# Patient Record
Sex: FEMALE | Race: ASIAN | Employment: PART TIME | URBAN - METROPOLITAN AREA
[De-identification: names, ages, dates, MRNs, and addresses within clinical notes are randomized per-mention and may not be internally consistent; named-entity substitution may affect disease eponyms.]

---

## 2020-02-26 ENCOUNTER — OFFICE VISIT (OUTPATIENT)
Dept: FAMILY MEDICINE | Facility: CLINIC | Age: 30
End: 2020-02-26
Payer: COMMERCIAL

## 2020-02-26 VITALS
TEMPERATURE: 98.6 F | HEART RATE: 83 BPM | WEIGHT: 132.3 LBS | DIASTOLIC BLOOD PRESSURE: 66 MMHG | BODY MASS INDEX: 25.97 KG/M2 | RESPIRATION RATE: 16 BRPM | SYSTOLIC BLOOD PRESSURE: 105 MMHG | HEIGHT: 60 IN | OXYGEN SATURATION: 100 %

## 2020-02-26 DIAGNOSIS — N91.2 AMENORRHEA: ICD-10-CM

## 2020-02-26 DIAGNOSIS — Z00.00 ROUTINE GENERAL MEDICAL EXAMINATION AT A HEALTH CARE FACILITY: Primary | ICD-10-CM

## 2020-02-26 LAB — HCG UR QL: POSITIVE

## 2020-02-26 PROCEDURE — 99213 OFFICE O/P EST LOW 20 MIN: CPT | Mod: 25 | Performed by: FAMILY MEDICINE

## 2020-02-26 PROCEDURE — 99385 PREV VISIT NEW AGE 18-39: CPT | Performed by: FAMILY MEDICINE

## 2020-02-26 PROCEDURE — 81025 URINE PREGNANCY TEST: CPT | Performed by: FAMILY MEDICINE

## 2020-02-26 SDOH — HEALTH STABILITY: MENTAL HEALTH: HOW OFTEN DO YOU HAVE A DRINK CONTAINING ALCOHOL?: NEVER

## 2020-02-26 ASSESSMENT — MIFFLIN-ST. JEOR: SCORE: 1246.61

## 2020-02-26 NOTE — PROGRESS NOTES
SUBJECTIVE:   CC: Timothy Crowell is an 29 year old woman who presents for preventive health visit.     Healthy Habits:     Getting at least 3 servings of Calcium per day:  Yes    Bi-annual eye exam:  NO    Dental care twice a year:  NO    Sleep apnea or symptoms of sleep apnea:  None    Diet:  Regular (no restrictions)    Frequency of exercise:  4-5 days/week    Duration of exercise:  45-60 minutes    Taking medications regularly:  Yes    Medication side effects:  Not applicable    PHQ-2 Total Score: 0    Additional concerns today:  No          1) amenorrhea  = LMP was Jan 9th, 2020 and she has had normal periods q month , so missed her period in February , she did a home HCG which was positive and here to confirm, she does feel un usually tired lately, needs a nap q day  No nausea no vomiting   According to the LMP provided this gives her SHELDON of October 16th, 2020 .     Today's PHQ-2 Score:   PHQ-2 ( 1999 [employer]) 2/26/2020   Q1: Little interest or pleasure in doing things 0   Q2: Feeling down, depressed or hopeless 0   PHQ-2 Score 0   Q1: Little interest or pleasure in doing things Not at all   Q2: Feeling down, depressed or hopeless Not at all   PHQ-2 Score 0       Abuse: Current or Past(Physical, Sexual or Emotional)- No  Do you feel safe in your environment? Yes        Social History     Tobacco Use     Smoking status: Never Smoker     Smokeless tobacco: Never Used   Substance Use Topics     Alcohol use: Never     Frequency: Never     If you drink alcohol do you typically have >3 drinks per day or >7 drinks per week? No    Alcohol Use 2/26/2020   Prescreen: >3 drinks/day or >7 drinks/week? Not Applicable   Prescreen: >3 drinks/day or >7 drinks/week? -   No flowsheet data found.    Reviewed orders with patient.  Reviewed health maintenance and updated orders accordingly - Yes  Lab work is in process  Labs reviewed in EPIC  BP Readings from Last 3 Encounters:   02/26/20 105/66    Wt Readings from Last 3  Encounters:   02/26/20 60 kg (132 lb 4.8 oz)                  There is no problem list on file for this patient.    History reviewed. No pertinent surgical history.    Social History     Tobacco Use     Smoking status: Never Smoker     Smokeless tobacco: Never Used   Substance Use Topics     Alcohol use: Never     Frequency: Never     History reviewed. No pertinent family history.      Current Outpatient Medications   Medication Sig Dispense Refill     Prenatal Vit-Fe Fumarate-FA (PRENATAL VITAMINS PO)        No Known Allergies  No lab results found.     Mammogram not appropriate for this patient based on age.    Pertinent mammograms are reviewed under the imaging tab.  History of abnormal Pap smear: NO - age 21-29 PAP every 3 years recommended     Reviewed and updated as needed this visit by clinical staff  Tobacco  Allergies  Meds  Problems  Med Hx  Surg Hx  Fam Hx  Soc Hx          Reviewed and updated as needed this visit by Provider        History reviewed. No pertinent past medical history.   History reviewed. No pertinent surgical history.    Review of Systems  CONSTITUTIONAL: NEGATIVE for fever, chills, change in weight  INTEGUMENTARU/SKIN: NEGATIVE for worrisome rashes, moles or lesions  EYES: NEGATIVE for vision changes or irritation  ENT: NEGATIVE for ear, mouth and throat problems  RESP: NEGATIVE for significant cough or SOB  BREAST: NEGATIVE for masses, tenderness or discharge  CV: NEGATIVE for chest pain, palpitations or peripheral edema  GI: NEGATIVE for nausea, abdominal pain, heartburn, or change in bowel habits  : NEGATIVE for unusual urinary or vaginal symptoms. Periods are regular.  MUSCULOSKELETAL: NEGATIVE for significant arthralgias or myalgia  NEURO: NEGATIVE for weakness, dizziness or paresthesias  PSYCHIATRIC: NEGATIVE for changes in mood or affect     OBJECTIVE:   /66   Pulse 83   Temp 98.6  F (37  C) (Oral)   Resp 16   Ht 1.524 m (5')   Wt 60 kg (132 lb 4.8 oz)   LMP  01/09/2020 (Approximate)   SpO2 100%   Breastfeeding No   BMI 25.84 kg/m    Physical Exam  GENERAL: healthy, alert and no distress  EYES: Eyes grossly normal to inspection, PERRL and conjunctivae and sclerae normal  HENT: ear canals and TM's normal, nose and mouth without ulcers or lesions  NECK: no adenopathy, no asymmetry, masses, or scars and thyroid normal to palpation  RESP: lungs clear to auscultation - no rales, rhonchi or wheezes  CV: regular rate and rhythm, normal S1 S2, no S3 or S4, no murmur, click or rub, no peripheral edema and peripheral pulses strong  ABDOMEN: soft, nontender, no hepatosplenomegaly, no masses and bowel sounds normal  MS: no gross musculoskeletal defects noted, no edema  SKIN: no suspicious lesions or rashes  NEURO: Normal strength and tone, mentation intact and speech normal  PSYCH: mentation appears normal, affect normal/bright    Diagnostic Test Results:  Labs reviewed in Epic  none     ASSESSMENT/PLAN:   1. Routine general medical examination at a health care facility  Discussed diet,calcium,exercise.Went over self breast exam.Thin prep was NOT  Done= pt will do when she does her first OB visit , see below .Eyes and teeth UTD.No immunizations needed today.See orders below for tests ordered and screening needed.      2. Amenorrhea  Congratulated , discussed diet ,exerfcise , PNV , prenatal care and timelines etc   - HCG Qual, Urine ([id number])  SHELDON is October 16th, 2020 . Based on LMP of Jan 9th, 2020 .  COUNSELING:  Reviewed preventive health counseling, as reflected in patient instructions       Regular exercise       Healthy diet/nutrition       Vision screening       Folic Acid Counseling    Estimated body mass index is 25.84 kg/m  as calculated from the following:    Height as of this encounter: 1.524 m (5').    Weight as of this encounter: 60 kg (132 lb 4.8 oz).         reports that she has never smoked. She has never used smokeless tobacco.      Counseling Resources:  ATP  IV Guidelines  Pooled Cohorts Equation Calculator  Breast Cancer Risk Calculator  FRAX Risk Assessment  ICSI Preventive Guidelines  Dietary Guidelines for Americans, 2010  USDA's MyPlate  ASA Prophylaxis  Lung CA Screening    Marah Up MD  [facility]

## 2020-03-10 ENCOUNTER — TELEPHONE (OUTPATIENT)
Dept: OBGYN | Facility: CLINIC | Age: 30
End: 2020-03-10

## 2020-03-10 DIAGNOSIS — O20.9 VAGINAL BLEEDING IN PREGNANCY, FIRST TRIMESTER: Primary | ICD-10-CM

## 2020-03-10 DIAGNOSIS — O20.9 VAGINAL BLEEDING IN PREGNANCY, FIRST TRIMESTER: ICD-10-CM

## 2020-03-10 LAB — B-HCG SERPL-ACNC: ABNORMAL IU/L (ref 0–5)

## 2020-03-10 PROCEDURE — 84702 CHORIONIC GONADOTROPIN TEST: CPT | Performed by: OBSTETRICS & GYNECOLOGY

## 2020-03-10 PROCEDURE — 36415 COLL VENOUS BLD VENIPUNCTURE: CPT | Performed by: OBSTETRICS & GYNECOLOGY

## 2020-03-10 NOTE — TELEPHONE ENCOUNTER
Patient calling stating that she started having bright red bleeding that started yesterday after going to the bathroom. LMP 1/9 - 8w3d. With wiping only. No cramping at this time. Has nurse intake on 3/17 already scheduled - do you want to add an US on to that visit? Or do beta hcg first this week? And US after?  Dania Black RN

## 2020-03-10 NOTE — TELEPHONE ENCOUNTER
Timothy Crowell is a 29 year old  at 8w1d by LMP with bleeding. Recommend betas x2 and viability ultrasound.   Thanks,  Isabella Dia MD

## 2020-03-11 ENCOUNTER — ANCILLARY PROCEDURE (OUTPATIENT)
Dept: ULTRASOUND IMAGING | Facility: CLINIC | Age: 30
End: 2020-03-11
Payer: COMMERCIAL

## 2020-03-11 ENCOUNTER — OFFICE VISIT (OUTPATIENT)
Dept: OBGYN | Facility: CLINIC | Age: 30
End: 2020-03-11
Payer: COMMERCIAL

## 2020-03-11 VITALS
HEART RATE: 89 BPM | BODY MASS INDEX: 25.91 KG/M2 | OXYGEN SATURATION: 97 % | WEIGHT: 132 LBS | SYSTOLIC BLOOD PRESSURE: 95 MMHG | HEIGHT: 60 IN | DIASTOLIC BLOOD PRESSURE: 64 MMHG

## 2020-03-11 DIAGNOSIS — O20.9 BLEEDING IN EARLY PREGNANCY: Primary | ICD-10-CM

## 2020-03-11 DIAGNOSIS — O20.9 VAGINAL BLEEDING IN PREGNANCY, FIRST TRIMESTER: ICD-10-CM

## 2020-03-11 PROCEDURE — 99202 OFFICE O/P NEW SF 15 MIN: CPT | Performed by: OBSTETRICS & GYNECOLOGY

## 2020-03-11 PROCEDURE — 76815 OB US LIMITED FETUS(S): CPT | Performed by: OBSTETRICS & GYNECOLOGY

## 2020-03-11 ASSESSMENT — MIFFLIN-ST. JEOR: SCORE: 1245.25

## 2020-03-12 NOTE — PROGRESS NOTES
"GYN clinic visit  3/11/2020    CC: ultrasound follow up    HPI:   Timothy Crowell is a 29 year old  who presents to clinic today to discuss ultrasound results.     Recent ultrasound was done today for spotting in early pregnancy.  Had one episode of spotting this week, nothing since then.  No associated cramping.    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  Knows she works with acetone and \"other chemicals.\"    Patient's last menstrual period was 2020 (approximate)..     Reviewed GYN hx, OB hx, past medical hx, surgical hx and family hx.   Reviewed medications and allergies. Changes made in EPIC.     OBJECTIVE:  Vitals:    20 1343   BP: 95/64   Pulse: 89   SpO2: 97%   Weight: 59.9 kg (132 lb)   Height: 1.524 m (5')     Body mass index is 25.78 kg/m .    Gen: alert, oriented, no distress, cooperative and pleasant  Psych:  Appropriate mood and affect  Neuro:  Gait WNL.  Sensation and strength grossly intact.    ASSESSMENT:   Timothy Crowell is a 29 year old  here to discuss ultrasound results.       PLAN:  1. Bleeding in early pregnancy  Reviewed reassuring ultrasound results today.  Has VERY small subchorionic hemorrhage, so intermittent spotting may continue.  Should call with any heavy bleeding or painful bleeding.    Given working condition regulations in this country and requirements for ventilation, I would expect that her job wouldn't be dangerous.  \"Smell\" of chemicals doesn't necessarily indicate risk.  If they would like more definitive information, offered that they can get more information about exactly what chemicals she works with and we can give more guidance.  She may bring this information to her nurse visit, which is already scheduled.      Isabella Huerta MD      "

## 2020-03-17 ENCOUNTER — PRENATAL OFFICE VISIT (OUTPATIENT)
Dept: NURSING | Facility: CLINIC | Age: 30
End: 2020-03-17
Payer: COMMERCIAL

## 2020-03-17 VITALS
SYSTOLIC BLOOD PRESSURE: 97 MMHG | BODY MASS INDEX: 25.92 KG/M2 | WEIGHT: 132.7 LBS | OXYGEN SATURATION: 100 % | DIASTOLIC BLOOD PRESSURE: 62 MMHG | HEART RATE: 72 BPM

## 2020-03-17 DIAGNOSIS — Z34.01 ENCOUNTER FOR SUPERVISION OF NORMAL FIRST PREGNANCY IN FIRST TRIMESTER: Primary | ICD-10-CM

## 2020-03-17 DIAGNOSIS — Z23 NEED FOR TDAP VACCINATION: ICD-10-CM

## 2020-03-17 LAB
ABO + RH BLD: NORMAL
ABO + RH BLD: NORMAL
ALBUMIN UR-MCNC: NEGATIVE MG/DL
APPEARANCE UR: CLEAR
BILIRUB UR QL STRIP: NEGATIVE
BLD GP AB SCN SERPL QL: NORMAL
BLOOD BANK CMNT PATIENT-IMP: NORMAL
COLOR UR AUTO: YELLOW
ERYTHROCYTE [DISTWIDTH] IN BLOOD BY AUTOMATED COUNT: 12.6 % (ref 10–15)
GLUCOSE UR STRIP-MCNC: NEGATIVE MG/DL
HBA1C MFR BLD: 5.9 % (ref 0–5.6)
HCT VFR BLD AUTO: 38 % (ref 35–47)
HGB BLD-MCNC: 12.8 G/DL (ref 11.7–15.7)
HGB UR QL STRIP: NEGATIVE
KETONES UR STRIP-MCNC: NEGATIVE MG/DL
LEUKOCYTE ESTERASE UR QL STRIP: NEGATIVE
MCH RBC QN AUTO: 29.8 PG (ref 26.5–33)
MCHC RBC AUTO-ENTMCNC: 33.7 G/DL (ref 31.5–36.5)
MCV RBC AUTO: 89 FL (ref 78–100)
NITRATE UR QL: NEGATIVE
PH UR STRIP: 7.5 PH (ref 5–7)
PLATELET # BLD AUTO: 261 10E9/L (ref 150–450)
RBC # BLD AUTO: 4.29 10E12/L (ref 3.8–5.2)
SOURCE: ABNORMAL
SP GR UR STRIP: 1.01 (ref 1–1.03)
SPECIMEN EXP DATE BLD: NORMAL
UROBILINOGEN UR STRIP-ACNC: 0.2 EU/DL (ref 0.2–1)
WBC # BLD AUTO: 9.7 10E9/L (ref 4–11)

## 2020-03-17 PROCEDURE — 83036 HEMOGLOBIN GLYCOSYLATED A1C: CPT | Performed by: ADVANCED PRACTICE MIDWIFE

## 2020-03-17 PROCEDURE — 36415 COLL VENOUS BLD VENIPUNCTURE: CPT | Performed by: ADVANCED PRACTICE MIDWIFE

## 2020-03-17 PROCEDURE — 86780 TREPONEMA PALLIDUM: CPT | Performed by: ADVANCED PRACTICE MIDWIFE

## 2020-03-17 PROCEDURE — 81003 URINALYSIS AUTO W/O SCOPE: CPT | Performed by: ADVANCED PRACTICE MIDWIFE

## 2020-03-17 PROCEDURE — 87086 URINE CULTURE/COLONY COUNT: CPT | Performed by: ADVANCED PRACTICE MIDWIFE

## 2020-03-17 PROCEDURE — 87389 HIV-1 AG W/HIV-1&-2 AB AG IA: CPT | Performed by: ADVANCED PRACTICE MIDWIFE

## 2020-03-17 PROCEDURE — 99000 SPECIMEN HANDLING OFFICE-LAB: CPT | Performed by: ADVANCED PRACTICE MIDWIFE

## 2020-03-17 PROCEDURE — 85027 COMPLETE CBC AUTOMATED: CPT | Performed by: ADVANCED PRACTICE MIDWIFE

## 2020-03-17 PROCEDURE — 86850 RBC ANTIBODY SCREEN: CPT | Performed by: ADVANCED PRACTICE MIDWIFE

## 2020-03-17 PROCEDURE — 86900 BLOOD TYPING SEROLOGIC ABO: CPT | Performed by: ADVANCED PRACTICE MIDWIFE

## 2020-03-17 PROCEDURE — 86762 RUBELLA ANTIBODY: CPT | Performed by: ADVANCED PRACTICE MIDWIFE

## 2020-03-17 PROCEDURE — 83021 HEMOGLOBIN CHROMOTOGRAPHY: CPT | Mod: 90 | Performed by: ADVANCED PRACTICE MIDWIFE

## 2020-03-17 PROCEDURE — 99207 ZZC NO CHARGE NURSE ONLY: CPT

## 2020-03-17 PROCEDURE — 87340 HEPATITIS B SURFACE AG IA: CPT | Performed by: ADVANCED PRACTICE MIDWIFE

## 2020-03-17 PROCEDURE — 86901 BLOOD TYPING SEROLOGIC RH(D): CPT | Performed by: ADVANCED PRACTICE MIDWIFE

## 2020-03-17 NOTE — PROGRESS NOTES
Important Information for Provider: Patient presents for new ob teaching and labs, first pregnancy, LMP 1/9/2020, ultrasound done on 3/11/20. Declined first trimester screen at this time. Handouts reviewed and given. Patient has appointment with Nathaly Paulino on 3/30/2020.    Prenatal OB Questionnaire  Patient supplied answers from flow sheet for:  Prenatal OB Questionnaire.  Past Medical History  Diabetes?: No  Hypertension : No  Heart disease, mitral valve prolapse or rheumatic fever?: No  An autoimmune disease such as lupus or rheumatoid arthritis?: No  Kidney disease or urinary tract infection?: No  Epilepsy, seizures or spells?: No  Migraine headaches?: No  A stroke or loss of function or sensation?: No  Any other neurological problems?: No  Have you ever been treated for depression?: No  Are you having problems with crying spells or loss of self-esteem?: No  Have you ever required psychiatric care?: No  Have you ever had hepatitis, liver disease or jaundice?: No  Have you been treated for blood clots in your veins, deep vein thromosis, inflammation in the veins, thrombosis, phlebitis, pulmonary embolism or varicosities?: No  Have you had excessive bleeding after surgery or dental work?: No  Do you bleed more than other women after a cut or scratch?: No  Do you have a history of anemia?: No  Have you ever had thyroid problems or taken thyroid medication?: No   Do you have any endocrine problems?: No  Have you ever been in a major accident or suffered serious trauma?: No  Within the last year, has anyone hit, slapped, kicked or otherwise hurt you?: No  In the last year, has anyone forced you to have sex when you didn't want to?: No    Past Medical History 2   Have you ever received a blood transfusion?: No  Would you refuse a blood transfusion if a doctor judged it to be medically necessary?: No  Does anyone in your home smoke?: No  Do you use tobacco products?: No  Do you drink beer, wine or hard liquor?:  No  Do you use any of the following: marijuana, speed, cocaine, heroin, hallucinogens or other drugs?: No   Is your blood type Rh negative?: Unknown  Have you ever had abnormal antibodies in your blood?: No  Have you ever had asthma?: No  Have you ever had tuberculosis?: No  Do you have any allergies to drugs or over-the-counter medications?: No  Allergies: Dust Mites, Aspartame, Ethanol, Venlafaxine, Hydrochloride, Sertraline: No  Have you had any breast problems?: No  Have you ever ?: No  Have you had any gynecological surgical procedures such as cervical conization, a LEEP procedure, laser treatment, cryosurgery of the cervix or a dilation and curettage, etc?: No  Have you ever had any other surgical procedures?: No  Have you been hospitalized for a nonsurgical reason excluding normal delivery?: No  Have you ever had any anesthetic complications?: No  Have you ever had an abnormal pap smear?: No    Past Medical History (Continued)  Do you have a history of abnormalities of the uterus?: No  Did your mother take CED or any other hormones when she was pregnant with you?: No  Did it take you more than a year to become pregnant?: No  Have you ever been evaluated or treated for infertility?: No  Is there a history of medical problems in your family, which you feel may be important to this pregnancy?: (!) Yes(High cholesterol, father had stroke, mom liver cancer, sister high cholesterol)  Do you have any other problems we have not asked about which you feel may be important to this pregnancy?: (!) Yes    Symptoms since last menstrual period  Do you have any of the following symptoms: abdominal pain, blood in stools or urine, chest pain, shortness of breath, coughing or vomiting up blood, your heart racing or skipping beats, nausea and vomiting, pain on urination or vaginal discharge or bleed: No  Current medications, including over-the-counter medications, you are using? (If not applicable answer none):  Prenatal vitamins  Will the patient be 35 years old or older at the time of delivery?: No    Has the patient, baby's father or anyone in either family had:  Thalassemia (Italian, Greek, Mediterranean or  background only) and an MCV result less than 80?: Unknown  Neural tube defect such as meningomyelocele, spina bifida or anencephaly?: No  Congenital heart defect?: No  Down's Syndrome?: No  Matteo-Sachs disease (Yazidi, Cajun, Frisian-Armenian)?: No  Sickle cell disease or trait ()?: No  Hemophilia or other inherited problems of blood?: No  Muscular dystrophy?: No  Cystic fibrosis?: No  Chely's chorea?: No  Mental retardation/autism?: No  Any other inherited genetic or chromosomal disorder?: No  Maternal metabolic disorder (e.g Insulin-dependent diabetes, PKU)?: No  A child with birth defects not listed above?: No  Recurrent pregnancy loss or stillbirth?: No   Has the patient had any medications/street drugs/alcohol since her last menstrual period?: No  Does the patient or baby's father have any other genetic risks?: No    Infection History   Do you object to being tested for Hepatitis B?: No  Do you object to being tested for HIV?: No   Do you feel that you are at high risk for coming in contact with the AIDS virus?: No  Have you ever been treated for tuberculosis?: No  Have you ever had a positive skin test for tuberculosis?: No  Do you live with someone who has tuberculosis?: No  Have you ever been exposed to tuberculosis?: No  Do you have genital herpes?: No  Does your partner have genital herpes?: No  Have you had a viral illness since your last period?: No  Have you ever had gonorrhea, chlamydia, syphilis, venereal warts, trichomoniasis, pelvic inflammatory disease or any other sexually transmitted disease?: No  Do you know if you are a genital group B streptococcus carrier?: No  Have you had chicken pox/varicella?: No   Have you been vaccinated against chicken Pox?: (!) Yes  Have you had any  other infectious diseases?: No    Allergies as of 3/17/2020:    Allergies as of 03/17/2020 - Reviewed 03/17/2020   Allergen Reaction Noted     Seasonal allergies  03/11/2020       Current medications are:  Current Outpatient Medications   Medication Sig Dispense Refill     Prenatal Vit-Fe Fumarate-FA (PRENATAL VITAMINS PO)            Early ultrasound screening tool:    Does patient have irregular periods?  No  Did patient use hormonal birth control in the three months prior to positive urine pregnancy test? No  Is the patient breastfeeding?  No  Is the patient 10 weeks or greater at time of education visit?  No      Shalonda Powell, RN-BSN

## 2020-03-18 LAB
BACTERIA SPEC CULT: NORMAL
HBV SURFACE AG SERPL QL IA: NONREACTIVE
HGB A1 MFR BLD: 96.6 % (ref 95–97.9)
HGB A2 MFR BLD: 3 % (ref 2–3.5)
HGB C MFR BLD: 0 % (ref 0–0)
HGB E MFR BLD: 0 % (ref 0–0)
HGB F MFR BLD: 0.4 % (ref 0–2.1)
HGB FRACT BLD ELPH-IMP: NORMAL
HGB OTHER MFR BLD: 0 % (ref 0–0)
HGB S BLD QL SOLY: NORMAL
HGB S MFR BLD: 0 % (ref 0–0)
HIV 1+2 AB+HIV1 P24 AG SERPL QL IA: NONREACTIVE
PATH INTERP BLD-IMP: NORMAL
RUBV IGG SERPL IA-ACNC: 182 IU/ML
SPECIMEN SOURCE: NORMAL
T PALLIDUM AB SER QL: NONREACTIVE

## 2020-03-19 PROBLEM — Z34.01 ENCOUNTER FOR SUPERVISION OF NORMAL FIRST PREGNANCY IN FIRST TRIMESTER: Status: ACTIVE | Noted: 2020-03-19

## 2020-03-30 ENCOUNTER — PRENATAL OFFICE VISIT (OUTPATIENT)
Dept: MIDWIFE SERVICES | Facility: CLINIC | Age: 30
End: 2020-03-30
Payer: COMMERCIAL

## 2020-03-30 VITALS
SYSTOLIC BLOOD PRESSURE: 116 MMHG | BODY MASS INDEX: 26.17 KG/M2 | WEIGHT: 134 LBS | DIASTOLIC BLOOD PRESSURE: 66 MMHG | HEART RATE: 85 BPM | TEMPERATURE: 97.6 F

## 2020-03-30 DIAGNOSIS — Z34.01 ENCOUNTER FOR SUPERVISION OF NORMAL FIRST PREGNANCY IN FIRST TRIMESTER: ICD-10-CM

## 2020-03-30 DIAGNOSIS — R73.09 ELEVATED HEMOGLOBIN A1C: Primary | ICD-10-CM

## 2020-03-30 LAB — GLUCOSE 1H P 50 G GLC PO SERPL-MCNC: 175 MG/DL (ref 60–129)

## 2020-03-30 PROCEDURE — 82950 GLUCOSE TEST: CPT | Performed by: ADVANCED PRACTICE MIDWIFE

## 2020-03-30 PROCEDURE — 36415 COLL VENOUS BLD VENIPUNCTURE: CPT | Performed by: ADVANCED PRACTICE MIDWIFE

## 2020-03-30 PROCEDURE — 99207 ZZC FIRST OB VISIT: CPT | Performed by: ADVANCED PRACTICE MIDWIFE

## 2020-03-30 NOTE — PROGRESS NOTES
Timothy Crowell is a 29 year old female, ,     Pt presents to clinic today for a NOB visit.  Patient's last menstrual period was 2020 (approximate).. Estimated Date of Delivery: Oct 26, 2020 is calculated from lmp and verified with U/S.    She has had bleeding since her LMP.  The bleeding  was bright red, in small, on one occasions, and was not accompanied by cramping...   She has not had nausea. Weight loss has not occurred.   This was a planned pregnancy.   DUSTINB is involved,  Christiano     OTHER CONCERNS: walking; concerned she is not getting enough exercise    Pt's hx of HSV is negative    ============================================  PERSONAL/SOCIAL HISTORY  Lives lives with their spouse.  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  Her job involves light activity .  Exercises minimal exercise  Pt denies abuse and feels safe in her home and relationships.     REVIEW OF SYSTEMS  C: NEGATIVE for fever, chills, change in weight  I: NEGATIVE for worrisome rashes, moles or lesions  E/M: NEGATIVE for ear, mouth and throat problems  R: NEGATIVE for significant cough or SOB  CV: NEGATIVE for chest pain, palpitations or peripheral edema  GI: NEGATIVE for nausea, abdominal pain, heartburn, or change in bowel habits  : NEGATIVE for frequency, dysuria, or hematuria  PSYCHIATRIC:  NEGATIVE for depression, anxiety or other mental health concerns    PHYSICAL EXAM:  /66 (BP Location: Left arm, Patient Position: Sitting, Cuff Size: Adult Regular)   Pulse 85   Temp 97.6  F (36.4  C) (Oral)   Wt 60.8 kg (134 lb)   LMP 2020 (Approximate)   BMI 26.17 kg/m    BMI- Body mass index is 26.17 kg/m ., RECOMMENDED WEIGHT GAIN: 25-35 lbs.  GENERAL:  Pleasant pregnant female, alert, cooperative and well groomed.  SKIN:  Warm and dry, without lesions or rashes  HEAD: Symmetrical features.  MOUTH:  Buccal mucosa pink, moist without lesions.  Teeth in good repair.     NECK:  Thyroid without enlargement and nodules.  Lymph nodes not palpable.   LUNGS:  Clear to auscultation.      HEART:  RRR without murmur.  ABDOMEN: Soft without masses , tenderness or organomegaly.  No CVA tenderness.  Uterus not palpable at size equal to dates.  No scars noted..  MUSCULOSKELETAL:  Full range of motion  EXTREMITIES:  No edema. No significant varicosities.     PELVIC EXAM: Deferred    GC/CHLAMYDIA CULTURE OBTAINED:NO     ASSESSMENT:  Intrauterine pregnancy at 10w0d weeks gestation.   No diagnosis found.    PLAN:  Oriented to CNM service.    Plan for early GCT today, due to HbA1C of 5.9; GCT result of 175; scheduled GTT    Instructed on use of triage nurse line and contacting the on call CNM after hours for an urgent need such as fever, vagina bleeding, bladder or vaginal infection, rupture of membranes,  or term labor.      Discussed the indications, uses for and false positives for quad screen  and fetal survey ultrasound at 18-20 weeks gestation. Will inform us at the next visit if she wished to avail herself of these screens. Wishes to discuss with .    Instructed on best evidence for: weight gain for her weight and height for pregnancy; healthy diet; exercise and activity during pregnancy; and maintenance of a generally healthy lifestyle.     Discussed the harms, benefits, side effects and alternative therapies for current prescribed and OTC medications.    SARA Cosme [facility]

## 2020-03-31 DIAGNOSIS — Z34.01 ENCOUNTER FOR SUPERVISION OF NORMAL FIRST PREGNANCY IN FIRST TRIMESTER: ICD-10-CM

## 2020-03-31 DIAGNOSIS — E11.9 TYPE 2 DIABETES MELLITUS WITHOUT COMPLICATION, WITHOUT LONG-TERM CURRENT USE OF INSULIN (H): Primary | ICD-10-CM

## 2020-03-31 DIAGNOSIS — R73.09 ELEVATED HEMOGLOBIN A1C: ICD-10-CM

## 2020-03-31 LAB
GLUCOSE 1H P 100 G GLC PO SERPL-MCNC: 247 MG/DL (ref 60–179)
GLUCOSE 2H P 100 G GLC PO SERPL-MCNC: 199 MG/DL (ref 60–154)
GLUCOSE 3H P 100 G GLC PO SERPL-MCNC: 113 MG/DL (ref 60–139)
GLUCOSE P FAST SERPL-MCNC: 100 MG/DL (ref 60–94)

## 2020-03-31 PROCEDURE — 36415 COLL VENOUS BLD VENIPUNCTURE: CPT | Performed by: ADVANCED PRACTICE MIDWIFE

## 2020-03-31 PROCEDURE — 82951 GLUCOSE TOLERANCE TEST (GTT): CPT | Performed by: ADVANCED PRACTICE MIDWIFE

## 2020-03-31 PROCEDURE — 82952 GTT-ADDED SAMPLES: CPT | Performed by: ADVANCED PRACTICE MIDWIFE

## 2020-04-01 ENCOUNTER — TELEPHONE (OUTPATIENT)
Dept: MIDWIFE SERVICES | Facility: CLINIC | Age: 30
End: 2020-04-01

## 2020-04-01 ENCOUNTER — APPOINTMENT (OUTPATIENT)
Dept: INTERPRETER SERVICES | Facility: CLINIC | Age: 30
End: 2020-04-01
Payer: COMMERCIAL

## 2020-04-01 NOTE — TELEPHONE ENCOUNTER
Diabetes Education Scheduling Outreach #1:    Call to patient to schedule. Patient is scheduled with CDE on 04/08 but they have not received results for 3 hr gtt. They would like a call to go over results and questions they have.    Zenaida Antonio OnCall  Diabetes and Nutrition Scheduling

## 2020-04-01 NOTE — TELEPHONE ENCOUNTER
TC to patient with [facility] . Discussed lab results. Pt has appt with DE on 4/8. Discussed they will discuss next steps and prescribe the materials needed to check blood sugars 4 times daily. Pt states that she has testing materials already. Advised she start checking her BS and document so she can review with DE on 4/8. Pt stated understanding. Pt asked that the results be released on [employer] so I released them since reviewed by [facility]. Routing to [facility] as FYI.   Shalonda Powell RN-BSN

## 2020-04-02 ENCOUNTER — TELEPHONE (OUTPATIENT)
Dept: ENDOCRINOLOGY | Facility: CLINIC | Age: 30
End: 2020-04-02

## 2020-04-02 ENCOUNTER — TELEPHONE (OUTPATIENT)
Dept: MIDWIFE SERVICES | Facility: CLINIC | Age: 30
End: 2020-04-02

## 2020-04-02 DIAGNOSIS — E11.9 TYPE 2 DIABETES MELLITUS WITHOUT COMPLICATION, WITHOUT LONG-TERM CURRENT USE OF INSULIN (H): ICD-10-CM

## 2020-04-02 DIAGNOSIS — O09.90 PREGNANCY, SUPERVISION FOR, HIGH-RISK, UNSPECIFIED TRIMESTER: Primary | ICD-10-CM

## 2020-04-02 NOTE — TELEPHONE ENCOUNTER
----- Message from SARA Almodovar CNM sent at 4/1/2020  4:29 PM CDT -----  Hello,   This patient was diagnosed with type 2 diabetes. Can you help her get rescheduled to see the physicians. Looks like DE was able to get in touch with her and get her scheduled. Thanks! Homa

## 2020-04-02 NOTE — TELEPHONE ENCOUNTER
Discussed endocrinology consult with pt/.  [employer] message sent with phone number for scheduling. Michelle Meza RN

## 2020-04-02 NOTE — TELEPHONE ENCOUNTER
Pt will be changing from CNM to MD care due to type 2 diabetes.  Do you want an endocrinology consult for her?  Michelle Meza RN

## 2020-04-07 VITALS — WEIGHT: 134 LBS | BODY MASS INDEX: 26.17 KG/M2

## 2020-04-07 ASSESSMENT — PAIN SCALES - GENERAL: PAINLEVEL: NO PAIN (0)

## 2020-04-07 NOTE — PROGRESS NOTES
"Timothy Crowell is a 29 year old female who is being evaluated via a billable phone visit.      The patient has been notified of following:     \"This phone visit will be conducted via a call between you and your physician/provider. We have found that certain health care needs can be provided without the need for an in-person physical exam.  This service lets us provide the care you need with a video conversation.  If a prescription is necessary we can send it directly to your pharmacy.  If lab work is needed we can place an order for that and you can then stop by our lab to have the test done at a later time.    Phone visits are billed at different rates depending on your insurance coverage.  Please reach out to your insurance provider with any questions.    If during the course of the call the physician/provider feels a phone visit is not appropriate, you will not be charged for this service.\"    Patient has given verbal consent for phone visit? Yes    Timothy Crowell complains of    Chief Complaint   Patient presents with     RECHECK     dm 2       I have reviewed and updated the patient's Past Medical History, Social History, Family History and Medication List.    ALLERGIES  Seasonal allergies      Video-Visit Details    Type of service:  phone Visit    Total Encounter Time : 27 minutes    Originating Location (pt. Location): Home    Distant Location (provider location):  [facility] ENDOCRINOLOGY     Mode of Communication: phone                                                                               - Endocrinology Initial Consultation -    Reason for visit/consult:  Data Unavailable    Primary care provider: No Ref-Primary, Physician    HPI:  A 28 yo female here for the evaluation of her gestational diabetes. Phone visit and her  joined as well. She is currently 11 weeks of pregnancy, this is the first pregnancy. She has been seen by midwife. She has a family history of diabetes in " multiple family members. Meternal grand mother DM2, her sister GDM, a sister with elevated TG.     She recalls that she was told glucose was borderline 2 years ago. She usually eats Asian foods, mainly rice and noodle, with lots sugar containing sweets.     She failed OGTT, and since then she did complete cut down rice, noodle, and sweets.     She is checking glucose at home 4 times a day. I summarized below.     No other medical history noted.   Current Diabetic Regimens:  Diet only    Life Style:  Wake up  Breakfast  Lunch  Dinner  Bedtime    Exercise:      DM complications:  Retinopathy: no  Nephropathy: no  Neuropathy: no  Most recent LDL: No results found for: LDL]    Glucose Log: We downloaded glucometer and analyzed and summarize here.   from 4/2 to 4/6/2020  - Before breakfast: 96, 81, 85, 86, 82.  -After breakfast 1 hour: 109, 90, 139, 109, 109.  - After lunch 1 hour: 101, 97, 104, 89, 147.  - After dinner 1 hour: 128, 87, 103, 109, 98    A1C trends from previous labs:   Hemoglobin A1C   Date Value Ref Range Status   03/17/2020 5.9 (H) 0 - 5.6 % Final     Comment:     Normal <5.7% Prediabetes 5.7-6.4%  Diabetes 6.5% or higher - adopted from ADA   consensus guidelines.          Past Medical/Surgical History:  No past medical history on file.  No past surgical history on file.    Allergies:  Allergies   Allergen Reactions     Seasonal Allergies        Current Medications   Current Outpatient Medications   Medication     Prenatal Vit-Fe Fumarate-FA (PRENATAL VITAMINS PO)     Blood Glucose Monitoring Suppl (ONETOUCH VERIO FLEX SYSTEM) w/Device KIT     OneTouch Delica Lancets 33G MISC     ONETOUCH VERIO IQ test strip     No current facility-administered medications for this visit.        Family History:  Family History   Problem Relation Age of Onset     Liver Cancer Mother      Cerebrovascular Disease Father      Hyperlipidemia Sister        Social History:  Social History     Tobacco Use     Smoking status:  Never Smoker     Smokeless tobacco: Never Used   Substance Use Topics     Alcohol use: Not Currently     Frequency: Never       ROS:  Full review of systems taken with the help of the intake sheet. Otherwise a complete 14 point review of systems was taken and is negative unless stated in the history above.    Physical Exam:   Vitals: Wt 60.8 kg (134 lb)   LMP 01/09/2020 (Approximate)   BMI 26.17 kg/m    BMI= Body mass index is 26.17 kg/m .   General: well appearing, no acute distress, pleasant and conversant,   Mental Status/neuro: alert and oriented      Labs : I reviewed data from epic and extract and summarize the pertinent data here.     No results found for: T4  Lab Results   Component Value Date    A1C 5.9 03/17/2020         Assessment and Plan  29 year old female with GDM, first pregnancy    GDM    Family history of DM     ethnicity/life style    With strict diet, currently she is still undercontrolled. However this is early stage of pregnancy, and her A1C 5.9, also family history of DM, I explained to the patient that we mostly need insulin some point during the pregnancy.     I will follow up closely, next in 2 weeks, then reassess.   Also if insulin start, we need to do video visit for teaching.     Since patient asked we also went over  guideline of GDM management ([place]) which is very similar to [place] guideline.       Monika Castro MD  Staff Physician  Endocrinology and Metabolism  License: [id number]

## 2020-04-08 ENCOUNTER — VIRTUAL VISIT (OUTPATIENT)
Dept: ENDOCRINOLOGY | Facility: CLINIC | Age: 30
End: 2020-04-08
Payer: COMMERCIAL

## 2020-04-08 ENCOUNTER — ALLIED HEALTH/NURSE VISIT (OUTPATIENT)
Dept: EDUCATION SERVICES | Facility: CLINIC | Age: 30
End: 2020-04-08
Payer: COMMERCIAL

## 2020-04-08 ENCOUNTER — PRE VISIT (OUTPATIENT)
Dept: ENDOCRINOLOGY | Facility: CLINIC | Age: 30
End: 2020-04-08

## 2020-04-08 DIAGNOSIS — E11.9 TYPE 2 DIABETES MELLITUS WITHOUT COMPLICATION, WITHOUT LONG-TERM CURRENT USE OF INSULIN (H): Primary | ICD-10-CM

## 2020-04-08 DIAGNOSIS — Z83.3 FAMILY HISTORY OF DIABETES MELLITUS: ICD-10-CM

## 2020-04-08 DIAGNOSIS — O24.410 DIET CONTROLLED GESTATIONAL DIABETES MELLITUS (GDM) IN FIRST TRIMESTER: Primary | ICD-10-CM

## 2020-04-08 DIAGNOSIS — R73.09 ELEVATED HEMOGLOBIN A1C: ICD-10-CM

## 2020-04-08 PROCEDURE — 98968 PH1 ASSMT&MGMT NQHP 21-30: CPT

## 2020-04-08 RX ORDER — LANCETS 33 GAUGE
1 EACH MISCELLANEOUS DAILY
Qty: 100 EACH | Refills: 1 | Status: SHIPPED | OUTPATIENT
Start: 2020-04-08 | End: 2020-05-22

## 2020-04-08 RX ORDER — BLOOD SUGAR DIAGNOSTIC
STRIP MISCELLANEOUS
Qty: 200 STRIP | Refills: 3 | Status: SHIPPED | OUTPATIENT
Start: 2020-04-08 | End: 2022-05-19

## 2020-04-08 RX ORDER — BLOOD-GLUCOSE METER
1 EACH MISCELLANEOUS 4 TIMES DAILY
Qty: 1 KIT | Refills: 0 | Status: SHIPPED | OUTPATIENT
Start: 2020-04-08 | End: 2022-05-19

## 2020-04-08 NOTE — PROGRESS NOTES
Diabetes Self-Management Education & Support    Initial Gestational Diabetes Self-Management Education & Support    SUBJECTIVE/OBJECTIVE:  Telephone education related to gestational diabetes.    Diabetes management related comments/concerns: What should I eat?    Cultural Influences/Ethnic Background:  [place]      Estimated Date of Delivery: Oct 26, 2020    1 hour OGTT  Lab Results   Component Value Date    GLU1 175 (H) 2020       3 hour OGTT    Fasting  Lab Results   Component Value Date     (H) 2020       1 hour  Lab Results   Component Value Date    GL1 247 (H) 2020       2 hour  Lab Results   Component Value Date    GL2 199 (H) 2020       3 hour  Lab Results   Component Value Date    GL3 113 2020       Lifestyle and Health Behaviors:  Pre-pregnancy weight (lbs): 132  Exercise:: Yes  Days per week of moderate to strenuous exercise (like a brisk walk): 7  On average, minutes per day of exercise at this level: 30  How intense was your typical exercise? : Light (like stretching or slow walking)  Exercise Minutes per Week: 210  Barrier to exercise: None  Meals include: Breakfast, Lunch, Dinner, Afternoon Snack  Beverages: Water  Cultural/Episcopalian diet restrictions?: No  Biggest challenges to healthy eating: None  Pre- vitamin?: Yes  Supplements?: No  Experiencing nausea?: No    Healthy Coping:  Emotional response to diabetes: Ready to learn  Informal Support system:: Significant other  Stage of change: ACTION (Actively working towards change)    Current Management:  Taking medications for gestational diabetes?: No    ASSESSMENT:  Needs education on GDM pathyphysiology, BG testing and GDM meal plan.  Patient has a BG meter that she purchased off [employer] a couple years ago.  Will send a RX for a One Touch meter to patient's preferred pharmacy.    Date Breakfast  Lunch  Dinner  Bedtime    Before After Before After Before After     90          90 95  106  96     82  109  137  98    4/5 86 109  89  109    4/4 85 139    103    4/3 81 90  97  87    4/2 96 109  101  128          INTERVENTION:    Educational topics covered today:  GDM diagnosis, pathophysiology, Risks and Complications of GDM, Means of controlling GDM, Using a Blood Glucose Monitor, Blood Glucose Goals, Logging and Interpreting Glucose Results, Ketone Testing, When to Call a Diabetes Educator or OB Provider, Healthy Eating During Pregnancy, Counting Carbohydrates, Meal Planning for GDM, and Physical Activity    Educational materials provided today:   [employer] Understanding Gestational Diabetes  GDM Log Book  Sharps Disposal  Care After Delivery      Pt verbalized understanding of concepts discussed and recommendations provided today.     PLAN:  Check glucose 4 times daily, before breakfast and 1 hour after each meal.     Check Ketones daily for one week, if negative, reduce testing to once a week.     Physical activity recommended: as able.    Meal plan: 2-3 carbs at breakfast, 3-4 carbs at lunch, 3-4 carbs at supper, 1-2 carbs at 3 snacks a day.  Follow consistent CHO meal plan, eat CHO and protein/fat at all meals/snacks.    Call/e-mail/[employer] message diabetes educator if 3 or more blood sugars are above the goal in 1 week, if ketones are positive, or with questions/concerns.    JACOBY Rhoades CDE    Time Spent: 90 minutes - PHONE  Encounter Type: Individual    Any diabetes medication dose changes were made via the CDE Protocol and Collaborative Practice Agreement with the patient's OB/GYN provider. A copy of this encounter was shared with the provider.

## 2020-04-12 ENCOUNTER — MYC MEDICAL ADVICE (OUTPATIENT)
Dept: EDUCATION SERVICES | Facility: CLINIC | Age: 30
End: 2020-04-12

## 2020-04-14 NOTE — PROGRESS NOTES
"Timothy Crowell is a 29 year old female who is being evaluated via a billable telephone visit.      The patient has been notified of following:     \"This telephone visit will be conducted via a call between you and your physician/provider. We have found that certain health care needs can be provided without the need for a physical exam.  This service lets us provide the care you need with a short phone conversation.  If a prescription is necessary we can send it directly to your pharmacy.  If lab work is needed we can place an order for that and you can then stop by our lab to have the test done at a later time.    Telephone visits are billed at different rates depending on your insurance coverage. During this emergency period, for some insurers they may be billed the same as an in-person visit.  Please reach out to your insurance provider with any questions.    If during the course of the call the physician/provider feels a telephone visit is not appropriate, you will not be charged for this service.\"    Patient has given verbal consent for Telephone visit?  Yes    How would you like to obtain your AVS? Elfego Lomas MA    Due to the COVID 19 pandemic this visit was converted to a telephone visit in order to help prevent spread of infection in this patient and the general population.    Time of start: 1:09 pm.  Time of end: 1:35 pm  Total duration of telephone visit: 26 minutes.    This visit would have been billed as a 02343 visit today.    JIMMY Crowell is a 29 year old female with gestational diabetes.  Telephone visit today for diabetes follow up. Her  was also present on the phone call today.  No previous hx of diabetes.  Pt was seen by Dr. Castro in our clinic on 4/8/2020.   This is Timothy's first pregnancy and she is currently [id number] pregnant an followed by OB at [facility].  Her SHELDON is 10/22/2020.  She has been followed closely by Christine Torres RD for diabetes education and " support.  Pt is not taking any insulin at this time.  She has been doing a good job of checking her blood sugars fasting each am and 1 hr postmeals daily  Blood sugar data below:  4/15/2020 FBS 92 ;  1 hr postbreakfast 131  4/14/2020 ;  1 hr [id number], postlunch 163-193 and postdinner 168.  4/13/2020 ; postbreakfast 112; postlunch 128; postdinner 174.  4/12/2020 FBS 97; postbreakfast 173, postlunch 98 and postdinner 116.  On ROS today, she is anxious about her higher blood sugar values.  Some headaches and mild nausea.  She denies SOB at rest, cough, fever, chills or chest pain.  No abd pain or diarrhea.  No dysuria or hematuria.    ROS  Please see under HPI.    Allergies  Allergies   Allergen Reactions     Seasonal Allergies        Medications  Current Outpatient Medications   Medication Sig Dispense Refill     insulin isophane human (HUMULIN N KWIKPEN) 100 UNIT/ML injection Inject 6 units subcutaneous at bedtime. 1500 mL 1     Blood Glucose Monitoring Suppl (ONETOUCH VERIO FLEX SYSTEM) w/Device KIT 1 each 4 times daily 1 kit 0     insulin pen needle (BD CARINA U/F) 32G X 4 MM miscellaneous Use 1 daily as directed. 100 each 1     OneTouch Delica Lancets 33G MISC 1 Box daily 100 each 1     ONETOUCH VERIO IQ test strip Use to test blood sugar 4 times daily or as directed.  Ok to substitute alternative if insurance prefers. 200 strip 3     Prenatal Vit-Fe Fumarate-FA (PRENATAL VITAMINS PO)          Family History  family history includes Cerebrovascular Disease in her father; Hyperlipidemia in her sister; Liver Cancer in her mother.    Social History   reports that she has never smoked. She has never used smokeless tobacco. She reports previous alcohol use. She reports that she does not use drugs.     Past Medical History  None.    Physical Exam    No exam today- telephone visit.    RESULTS  Hemoglobin A1C   Date Value Ref Range Status   03/17/2020 5.9 (H) 0 - 5.6 % Final     Comment:     Normal  <5.7% Prediabetes 5.7-6.4%  Diabetes 6.5% or higher - adopted from ADA   consensus guidelines.         ASSESSMENT/PLAN:    1.  GESTATIONAL DIABETES: Will start NPH 6 units subcutaneous at bedtime.  Pt is to continue to check her fasting blood sugar each am and also check her blood sugar 1 hr postmeal.  She is aware that we would like to see her FBS 95 or < and her 1 hr postmeal blood sugar 140 or <.  Hang is to continue to work with Christine Torres for her diabetes education and support and follow Christine's dietary recommendations.  Pt will probably need NPH BID and meal time insulin further into her pregnancy.  She will need to report her blood sugar readings 1-2 times weekly.  I asked pt to get a BP monitor and to check her BP at home.  Will plan to check a TSH.    2.  PREGNANCY: Pt is [id number] pregnant and followed by OB at [facility].    3.  Return to Endocrine Clinic to see me in 1 week.  Pt has my contact number to call if she has any questions.  I will also send a note to Christine Torres.

## 2020-04-15 ENCOUNTER — MYC MEDICAL ADVICE (OUTPATIENT)
Dept: EDUCATION SERVICES | Facility: CLINIC | Age: 30
End: 2020-04-15

## 2020-04-15 ENCOUNTER — ALLIED HEALTH/NURSE VISIT (OUTPATIENT)
Dept: EDUCATION SERVICES | Facility: CLINIC | Age: 30
End: 2020-04-15
Payer: COMMERCIAL

## 2020-04-15 ENCOUNTER — VIRTUAL VISIT (OUTPATIENT)
Dept: ENDOCRINOLOGY | Facility: CLINIC | Age: 30
End: 2020-04-15
Payer: COMMERCIAL

## 2020-04-15 ENCOUNTER — TELEPHONE (OUTPATIENT)
Dept: EDUCATION SERVICES | Facility: CLINIC | Age: 30
End: 2020-04-15

## 2020-04-15 ENCOUNTER — APPOINTMENT (OUTPATIENT)
Dept: INTERPRETER SERVICES | Facility: CLINIC | Age: 30
End: 2020-04-15
Payer: COMMERCIAL

## 2020-04-15 DIAGNOSIS — E11.9 TYPE 2 DIABETES MELLITUS WITHOUT COMPLICATION, WITHOUT LONG-TERM CURRENT USE OF INSULIN (H): Primary | ICD-10-CM

## 2020-04-15 DIAGNOSIS — E11.9 TYPE 2 DIABETES MELLITUS WITHOUT COMPLICATION, WITHOUT LONG-TERM CURRENT USE OF INSULIN (H): ICD-10-CM

## 2020-04-15 PROCEDURE — 98968 PH1 ASSMT&MGMT NQHP 21-30: CPT

## 2020-04-15 RX ORDER — INSULIN HUMAN 100 [IU]/ML
7 INJECTION, SUSPENSION SUBCUTANEOUS AT BEDTIME
Qty: 1500 ML | Refills: 1 | Status: SHIPPED | OUTPATIENT
Start: 2020-04-15 | End: 2020-04-15

## 2020-04-15 RX ORDER — PEN NEEDLE, DIABETIC 32GX 5/32"
NEEDLE, DISPOSABLE MISCELLANEOUS
Qty: 100 EACH | Refills: 1 | Status: SHIPPED | OUTPATIENT
Start: 2020-04-15 | End: 2020-10-29

## 2020-04-15 RX ORDER — INSULIN HUMAN 100 [IU]/ML
INJECTION, SUSPENSION SUBCUTANEOUS
Qty: 1500 ML | Refills: 1 | Status: SHIPPED | OUTPATIENT
Start: 2020-04-15 | End: 2020-04-16

## 2020-04-15 NOTE — TELEPHONE ENCOUNTER
Orders signed.    Charissa Garcia MD    Orders pending for approval for NPH insulin.    Christine Torres RD LD CDE

## 2020-04-15 NOTE — PATIENT INSTRUCTIONS
1. Check glucose 6 times daily, before each meal and 1 hour after each meal, as recommended.    2. Check ketones daily or once a week after they have been negative for 7 days in a row. If ketones are positive, let your diabetes educator know and continue to check daily until they are negative for 7 days in a row.    3. Continue with recommended physical activity.    4. Continue to follow recommended meal plan: 2 carbs at breakfast, 2-3 carbs at lunch, 2-3 carbs at supper, 1-2 carbs at snacks.  Follow consistent CHO meal plan, eat CHO and protein/fat at all meals/snacks.    5. Follow-up with OB doctor as recommended.    6. Call/e-mail diabetes educator if 3 or more blood sugars are above the goal in 1 week or if ketones are positive.       AFTER YOU DELIVER:  - Continue with healthy eating and physical activity to get back to your pre-pregnancy weight.   - Have a follow-up 2-hour Glucose Tolerance Test at your 6-week post-partum check-up.   - Have your fasting blood sugar checked once a year.  - Plan ahead for future pregnancies - eat healthy, keep active, work with your doctor to check for gestational diabetes early on in the pregnancy and check blood sugars as recommended by your doctor.

## 2020-04-15 NOTE — PATIENT INSTRUCTIONS
Start NPH insulin 6 units inject at bedtime.  Continue to check your blood sugar fasting each am, 1 hr after breakfast, 1 hr after lunch and 1 hr after dinner.  Continue to see Christine Torres for your diabetes education and support.  Telephone call with me again next Wednesday.  I can be reached by [employer] if you have a question or call me at [phone].  Sincerely,  Alma Salamanca PA-C

## 2020-04-15 NOTE — PROGRESS NOTES
Diabetes Self-Management Education & Support  Follow-up Gestational Diabetes Self-Management Education & Support    Patient verbally consented to the telephone visit service today: yes    SUBJECTIVE/OBJECTIVE:  Presents for telephone education related to gestational diabetes.    Accompanied by: Self    Cultural Influences/Ethnic Background:  Maltese      LMP 2020 (Approximate)       Estimated Date of Delivery: Oct 26, 2020    Blood Glucose/Ketone Log:   Date Breakfast  Lunch  Dinner  Bedtime    Before After Before After Before After     98 151  125  127    4/10 94 131  106  145     95 140  116  139     97 173  98*  116     107 112  128  174     100 131-146  163-193  168    4/15 92 131          On , patient tested numerous times post-meal on different fingers and recorded the different results (all within ~15%).    Patient reports BGs are higher since switching to the new, Accu-chek meter.  She will report this to the Endo today.    Lifestyle and Health Behaviors:  Exercise:: Yes  Days per week of moderate to strenuous exercise (like a brisk walk): 7  On average, minutes per day of exercise at this level: 40  How intense was your typical exercise? : Light (like stretching or slow walking)  Exercise Minutes per Week: 280  Barrier to exercise: None  Meals include: Breakfast, Lunch, Dinner, Morning Snack, Afternoon Snack, Evening Snack  Beverages: Water, Milk  Biggest challenges to healthy eating: None  Pre-matthias vitamin?: Yes  Supplements?: No  Experiencing nausea?: No    Healthy Coping:  Emotional response to diabetes: Ready to learn  Informal Support system:: Spouse  Stage of change: ACTION (Actively working towards change)    Current Management:  Taking medications for gestational diabetes?: No    ASSESSMENT:  Ketones: .   Fasting blood glucoses: 42% in target.  After breakfast: 57% in target.  After lunch: 83% in target.  After dinner: 50% in target.    Patient's blood sugars are  fluctuating quite a bit. With <50% fasting BGs within target, recommend initiate NPH insulin at bedtime at 0.1u/kg (~7 units).  The post-meal elevations may be food-related (white rice, white noodles) and patient is often under-eating carbohydrates and not always including protein at meals.  Advised patient to aim for at least 30 gm carb at meals (this is low, but more than what she is currently eating) and make sure to include fiber and protein in meals.      Advised patient to test pre-meal and post-meal for all meals in order to determine if NPH is best, or a long-acting insulin with possible short-acting insulin at meals.     INTERVENTION:  Educational topics covered today:      Educational Materials provided today:  [employer] Preventing Diabetes    PLAN:  Check glucose 6 times daily.  Check ketones once a week when readings are consistently negative.  Continue with recommended physical activity.  Continue to follow recommended meal plan: 2 carbs at breakfast, 2-3 carbs at lunch, 2-3 carbs at supper, 1-2 carbs at snacks.  Follow consistent CHO meal plan, eat CHO and protein/fat at all meals/snacks.  Awaiting response from OB on starting insulin.  Will review Endo recommendations from today's visit.    Call/e-mail/MyChart message diabetes educator if 3 or more blood sugars are above the goal in 1 week or if ketones are positive.    JACOBY Rhoades CDE    Time Spent: 60 minutes via phone  Encounter Type: Individual    Any diabetes medication dose changes were made via the CDE Protocol and Collaborative Practice Agreement with the patient's OB/GYN provider. A copy of this encounter was shared with the provider.

## 2020-04-16 DIAGNOSIS — O24.414 INSULIN CONTROLLED GESTATIONAL DIABETES MELLITUS (GDM) IN FIRST TRIMESTER: Primary | ICD-10-CM

## 2020-04-16 RX ORDER — BLOOD SUGAR DIAGNOSTIC
STRIP MISCELLANEOUS
Qty: 100 EACH | Refills: 3 | Status: SHIPPED | OUTPATIENT
Start: 2020-04-16 | End: 2020-10-29

## 2020-04-16 RX ORDER — HUMAN INSULIN 100 [IU]/ML
INJECTION, SUSPENSION SUBCUTANEOUS
Qty: 3 ML | Refills: 3 | Status: SHIPPED | OUTPATIENT
Start: 2020-04-16 | End: 2020-05-08

## 2020-04-16 RX ORDER — BLOOD SUGAR DIAGNOSTIC
STRIP MISCELLANEOUS
Qty: 100 EACH | Refills: 1 | Status: SHIPPED | OUTPATIENT
Start: 2020-04-16 | End: 2020-10-29

## 2020-04-16 NOTE — TELEPHONE ENCOUNTER
We received a drug change request from patient's pharmacy. The Humulin is not covered by patient's insurance. It has listed Novolin as one that is. Would you like to put in new orders for the doctor to sign?  Shalonda Orona RN

## 2020-04-16 NOTE — TELEPHONE ENCOUNTER
Patient's insurance will not cover Humulin N pens.  Orders sent to OB for approval of Novolin N vials.    Christine Torres RD LD CDE

## 2020-04-20 ENCOUNTER — TELEPHONE (OUTPATIENT)
Dept: ENDOCRINOLOGY | Facility: CLINIC | Age: 30
End: 2020-04-20

## 2020-04-20 NOTE — TELEPHONE ENCOUNTER
[facility] Call Center    Phone Message    May a detailed message be left on voicemail: yes     Reason for Call: Other: Pt called to schedule her next appt. with Alma Salamanca.  In the notes of the last visit with Alma Salamanca it stated to schedule a return visit in clinic in a week.  Pt states she does not want to come to the clinic due to Covid 19.  Please follow up with the Pt to joe.     Action Taken: Message routed to:  [facility] endo    Travel Screening: Not Applicable

## 2020-04-21 ENCOUNTER — ALLIED HEALTH/NURSE VISIT (OUTPATIENT)
Dept: EDUCATION SERVICES | Facility: CLINIC | Age: 30
End: 2020-04-21
Payer: COMMERCIAL

## 2020-04-21 DIAGNOSIS — E11.9 TYPE 2 DIABETES MELLITUS WITHOUT COMPLICATION, WITHOUT LONG-TERM CURRENT USE OF INSULIN (H): Primary | ICD-10-CM

## 2020-04-21 PROCEDURE — 98966 PH1 ASSMT&MGMT NQHP 5-10: CPT

## 2020-04-21 NOTE — Clinical Note
FYI - would think patient may need meal time insulin by the end of the week if not showing improvement in blood sugars. Would you agree? Also, would you like for us to continue to follow her for insulin adjustment, or do you wish to take over? Thank you! JACOBY Curran CDE

## 2020-04-21 NOTE — PROGRESS NOTES
Patient verbally consented to the telephone visit service today: yes      Diabetes Self-Management Education & Support  Follow-up Gestational Diabetes Self-Management Education & Support    SUBJECTIVE/OBJECTIVE:  Presents for education related to gestational diabetes.    Accompanied by: Self -- via telephone (no  on call -- patient states that she speaks English well and wants to speak without an )    Cultural Influences/Ethnic Background:  Lithuanian    LMP 01/09/2020 (Approximate)     Wt Readings from Last 1 Encounters:   04/07/20 60.8 kg (134 lb)       Estimated Date of Delivery: Oct 26, 2020    Blood Glucose/Ketone Log:     This morning, patient reports BG 70mg/dL      Lifestyle and Health Behaviors:  Exercise:: Yes  Days per week of moderate to strenuous exercise (like a brisk walk): 5  On average, minutes per day of exercise at this level: 30  How intense was your typical exercise? : Moderate (like brisk walking)  Exercise Minutes per Week: 150  Meals include: Breakfast, Lunch, Dinner, Morning Snack, Afternoon Snack, Evening Snack  Beverages: Water, Milk          Healthy Coping:  Emotional response to diabetes: Ready to learn  Informal Support system:: Partner  Stage of change: ACTION (Actively working towards change)    Current Management:  Taking medications for gestational diabetes?: Yes    ASSESSMENT:  Ketones: na.   Fasting blood glucoses: 66% in target.  After breakfast: 83% in target.  Before lunch: 83% in target  After lunch: 50% in target.  Before dinner: 80% in target.  After dinner: 66% in target.    Hang reports that last night she forgot her insulin until about midnight, and woke up with a 70mg/dL reading today. She does not have any symptoms of a low blood sugar, but wanted to make sure this was ok. Explained that due to the timing of her insulin dose, this makes sense, but we do want it to be taken consistently closer to the 10pm time frame. She is starting to feel more  hungry than she did earlier in her pregnancy, and asked for snack ideas that can keep her full.     When discussing her fluctuating blood sugars, Timothy did mention that she likes to go for walks, but the timing of that activity changes every day. Sometimes she will walk earlier in the day, and other times it is late afternoon. Did discuss that meal time insulin may be an option that we use soon, as she does have blood sugar spikes after meals despite following the meal plan. She verbalized understanding that this may a possibility.       2 units Novolog with lunch/dinner??    Last night 11:30pm took the insulin  -- bread with PB and a glass of milk (almond milk)    Walks in the morning or in the afternoon on various days    Call to make an appt for tomorrow for Endo    INTERVENTION:  With her blood sugar <80 this morning, will not increase NPH today. Asked her to report in blood sugars again Friday morning (while continuing 6 times daily testing) to see if meal time insulin may be needed. Would not recommend BID NPH right now given her in target pre meal readings, however may need 2 units with lunch and dinner -- will route to Endo for their input as well since she is also seeing them.     Educational topics covered today:  What to expect after delivery, Future testing for Type 2 diabetes (2 hour OGTT at 6 week post-partum check-up and annual fasting blood glucose level), Risk of GDM and planning ahead for future pregnancies, Recommended lifestyle interventions for reducing the risk of Type 2 Diabetes, When to Call a Diabetes Educator or OB Provider    Educational Materials provided today:  [place] Preventing Diabetes    PLAN:  Check glucose 6 times daily.  Check ketones once a week when readings are consistently negative.  Continue with recommended physical activity.  Continue to follow recommended meal plan: 2-3 carbs at breakfast, 3-4 carbs at lunch, 3-4 carbs at supper, 1-2 carbs at snacks.  Follow consistent  CHO meal plan, eat CHO and protein/fat at all meals/snacks.    Call/e-mail/MyChart message diabetes educator if 3 or more blood sugars are above the goal in 1 week or if ketones are positive.    JACOBY Curran CDE  Time Spent: 9:48 minutes  Encounter Type: Individual    Any diabetes medication dose changes were made via the CDE Protocol and Collaborative Practice Agreement with the patient's OB/GYN provider. A copy of this encounter was shared with the provider.

## 2020-04-23 NOTE — PROGRESS NOTES
"Timothy Crowell is a 29 year old female who is being evaluated via a billable telephone visit.      The patient has been notified of following:     \"This telephone visit will be conducted via a call between you and your physician/provider. We have found that certain health care needs can be provided without the need for a physical exam.  This service lets us provide the care you need with a short phone conversation.  If a prescription is necessary we can send it directly to your pharmacy.  If lab work is needed we can place an order for that and you can then stop by our lab to have the test done at a later time.    Telephone visits are billed at different rates depending on your insurance coverage. During this emergency period, for some insurers they may be billed the same as an in-person visit.  Please reach out to your insurance provider with any questions.    If during the course of the call the physician/provider feels a telephone visit is not appropriate, you will not be charged for this service.\"    Patient has given verbal consent for Telephone visit?  Yes    How would you like to obtain your AVS? Elfego Lomas MA    Due to the COVID 19 pandemic this visit was converted to a telephone visit in order to help prevent spread of infection in this patient and the general population.    Time of start: 11:30 am  Time of end: 11:41 am  Total duration of telephone visit: 11 minutes.    This visit would have been billed as a 18773 visit.    Timothy Crowell is a 29 year old female who is being evaluated via a billable telephone visit.      The patient has been notified of following:     \"This telephone visit will be conducted via a call between you and your physician/provider. We have found that certain health care needs can be provided without the need for a physical exam.  This service lets us provide the care you need with a short phone conversation.  If a prescription is necessary we can " "send it directly to your pharmacy.  If lab work is needed we can place an order for that and you can then stop by our lab to have the test done at a later time.    Telephone visits are billed at different rates depending on your insurance coverage. During this emergency period, for some insurers they may be billed the same as an in-person visit.  Please reach out to your insurance provider with any questions.    If during the course of the call the physician/provider feels a telephone visit is not appropriate, you will not be charged for this service.\"    Patient has given verbal consent for Telephone visit?  Yes    How would you like to obtain your AVS? Elfego Lomas MA    Due to the COVID 19 pandemic this visit was converted to a telephone visit in order to help prevent spread of infection in this patient and the general population.    Time of start: 1:09 pm.  Time of end: 1:35 pm  Total duration of telephone visit: 26 minutes.    This visit would have been billed as a [id number] visit today.    JIMMY Crowell is a 29 year old female with gestational diabetes. Telephone visit today for diabetes follow up.   No previous hx of diabetes.  Pt was seen by Dr. Castro in our clinic on 4/8/2020.   This is Timothy's first pregnancy and she is currently [id number] pregnant.  Her SHELDON is 10/22/2020.  She has been followed closely by Christine Torres RD for diabetes education and support.  Last visit I started pt on NPH insulin 6 units subcutaneous at hs.  She has been doing a good job of checking her blood sugars fasting each am and 1 hr postmeals daily  Her FBS values have been good - 95, 86, 70, 96, 93, 98, 89, 87 and 92.  Most of her 1 hr postmeal blood sugar values are < 140.  She had a few postlunch and postdinner blood sugar values of 146, 148 and 140.  Her 1 hr  postbreakfast blood sugars are < 140.  On ROS today, she is less anxious on the phone today.    No longer having headaches or nausea.  She has seasonal " "allergie- eyes are itchy and nasal congestion.  She denies SOB at rest, cough, fever, chills or chest pain.  No abd pain or diarrhea.  No dysuria or hematuria.    ROS  Please see under HPI.    Allergies  Allergies   Allergen Reactions     Seasonal Allergies        Medications  Current Outpatient Medications   Medication Sig Dispense Refill     Blood Glucose Monitoring Suppl (ONETOUCH VERIO FLEX SYSTEM) w/Device KIT 1 each 4 times daily 1 kit 0     insulin NPH (NOVOLIN N VIAL) 100 UNIT/ML vial Inject 6 units subcutaneous at hs. 3 mL 3     insulin  UNIT/ML vial Inject 6 Units Subcutaneous At Bedtime 3 mL 3     insulin pen needle (BD CARINA U/F) 32G X 4 MM miscellaneous Use 1 daily as directed. 100 each 1     insulin syringe-needle U-100 (31G X 5/16\" 0.3 ML) 31G X 5/16\" 0.3 ML miscellaneous Use 1 syringes daily or as directed. 100 each 1     insulin syringe-needle U-100 (BD INSULIN SYRINGE ULTRAFINE) 31G X 5/16\" 0.3 ML miscellaneous Use daily. 100 each 3     OneTouch Delica Lancets 33G MISC 1 Box daily 100 each 1     ONETOUCH VERIO IQ test strip Use to test blood sugar 4 times daily or as directed.  Ok to substitute alternative if insurance prefers. 200 strip 3     Prenatal Vit-Fe Fumarate-FA (PRENATAL VITAMINS PO)          Family History  family history includes Cerebrovascular Disease in her father; Hyperlipidemia in her sister; Liver Cancer in her mother.    Social History   reports that she has never smoked. She has never used smokeless tobacco. She reports previous alcohol use. She reports that she does not use drugs.     Past Medical History  None.    Physical Exam    No exam today- telephone visit.    RESULTS  Hemoglobin A1C   Date Value Ref Range Status   03/17/2020 5.9 (H) 0 - 5.6 % Final     Comment:     Normal <5.7% Prediabetes 5.7-6.4%  Diabetes 6.5% or higher - adopted from ADA   consensus guidelines.         ASSESSMENT/PLAN:    1.  GESTATIONAL DIABETES: Continue NPH 6 units subcutaneous at bedtime.  "   No need for meal time insulin or am NPH at this time.  Pt is to continue to check her fasting blood sugar each am and also check her blood sugar 1 hr postmeal.  She is aware that we would like to see her FBS 95 or < and her 1 hr postmeal blood sugar 140 or <.  Hang is to continue to work with Christine Torres for her diabetes education and support and follow Christine's dietary recommendations.  Pt will probably need NPH BID and meal time insulin further into her pregnancy.  I asked pt to get a BP monitor and to check her BP at home.  Will plan to check a TSH.    2.  PREGNANCY: Pt is [id number] pregnant and followed by OB at [place].    3.  Return to Endocrine Clinic to see me in 1 week.  Pt has my contact number to call if she has any questions.  I will also send a note to Christine Torres.

## 2020-04-24 ENCOUNTER — VIRTUAL VISIT (OUTPATIENT)
Dept: ENDOCRINOLOGY | Facility: CLINIC | Age: 30
End: 2020-04-24
Payer: COMMERCIAL

## 2020-04-24 DIAGNOSIS — O24.410 DIET CONTROLLED GESTATIONAL DIABETES MELLITUS (GDM) IN FIRST TRIMESTER: Primary | ICD-10-CM

## 2020-04-24 NOTE — PATIENT INSTRUCTIONS
No change in NPH insulin dose today.  Continue to check your blood sugar.  I will call you next week 5/1/2020.

## 2020-04-29 NOTE — PROGRESS NOTES
"Timothy Crowell is a 29 year old female who is being evaluated via a billable telephone visit.      The patient has been notified of following:     \"This telephone visit will be conducted via a call between you and your physician/provider. We have found that certain health care needs can be provided without the need for a physical exam.  This service lets us provide the care you need with a short phone conversation.  If a prescription is necessary we can send it directly to your pharmacy.  If lab work is needed we can place an order for that and you can then stop by our lab to have the test done at a later time.    Telephone visits are billed at different rates depending on your insurance coverage. During this emergency period, for some insurers they may be billed the same as an in-person visit.  Please reach out to your insurance provider with any questions.    If during the course of the call the physician/provider feels a telephone visit is not appropriate, you will not be charged for this service.\"    Patient has given verbal consent for Telephone visit?  Yes    How would you like to obtain your AVS? Elfego Lomas MA    Due to the COVID 19 pandemic this visit was converted to a telephone visit in order to help prevent spread of infection in this patient and the general population.    Time of start: 9:30 am   Time of end: 9:40 am  Total duration of telephone visit: 10 minutes.    This visit would have been billed as a [id number] visit.    JIMMY Crowell is a 29 year old female with gestational diabetes. Telephone visit today for diabetes follow up.   No previous hx of diabetes.  Pt was seen by Dr. Castro in our clinic on 4/8/2020.   This is Timothy's first pregnancy and she is currently [id number] pregnant.  Her SHELDON is 10/22/2020.  She has been followed closely by Christine Torres RD for diabetes education and support.  Timothy continues to take NPH 6 units subcutaneous at bedtime.  She has been doing a " "good job of checking her blood sugars fasting each am and 1 hr postmeals daily  Her FBS values have been good -  96, 88, 86, 92, 97 and 92.  Her 1 hour postmeal blood sugar values have been 104, 127, 99, 115, 108, 108 and 109.   On ROS today, she has seasonal allergies- eyes are itchy and nasal congestion.  She denies headaches, n/v, SOB at rest, cough, fever, chills or chest pain.  No abd pain or diarrhea.  No dysuria or hematuria.  Pt denies vaginal bleeding.    ROS  Please see under HPI.    Allergies  Allergies   Allergen Reactions     Seasonal Allergies        Medications  Current Outpatient Medications   Medication Sig Dispense Refill     Blood Glucose Monitoring Suppl (ONETOUCH VERIO FLEX SYSTEM) w/Device KIT 1 each 4 times daily 1 kit 0     insulin NPH (NOVOLIN N VIAL) 100 UNIT/ML vial Inject 6 units subcutaneous at hs. 3 mL 3     insulin  UNIT/ML vial Inject 6 Units Subcutaneous At Bedtime 3 mL 3     insulin pen needle (BD CARINA U/F) 32G X 4 MM miscellaneous Use 1 daily as directed. 100 each 1     insulin syringe-needle U-100 (31G X 5/16\" 0.3 ML) 31G X 5/16\" 0.3 ML miscellaneous Use 1 syringes daily or as directed. 100 each 1     insulin syringe-needle U-100 (BD INSULIN SYRINGE ULTRAFINE) 31G X 5/16\" 0.3 ML miscellaneous Use daily. 100 each 3     OneTouch Delica Lancets 33G MISC 1 Box daily 100 each 1     ONETOUCH VERIO IQ test strip Use to test blood sugar 4 times daily or as directed.  Ok to substitute alternative if insurance prefers. 200 strip 3     Prenatal Vit-Fe Fumarate-FA (PRENATAL VITAMINS PO)          Family History  family history includes Cerebrovascular Disease in her father; Hyperlipidemia in her sister; Liver Cancer in her mother.    Social History   reports that she has never smoked. She has never used smokeless tobacco. She reports previous alcohol use. She reports that she does not use drugs.     Past Medical History  None.    Physical Exam    No exam today- telephone " visit.    RESULTS  Hemoglobin A1C   Date Value Ref Range Status   03/17/2020 5.9 (H) 0 - 5.6 % Final     Comment:     Normal <5.7% Prediabetes 5.7-6.4%  Diabetes 6.5% or higher - adopted from ADA   consensus guidelines.         ASSESSMENT/PLAN:    1.  GESTATIONAL DIABETES: Continue NPH 6 units subcutaneous at bedtime.    No need for meal time insulin or am NPH at this time.  Pt is to continue to check her fasting blood sugar each am and also check her blood sugar 1 hr postmeals.  She is aware that we would like to see her FBS 95 or < and her 1 hr postmeal blood sugar 140 or <.  Hang is to continue to work with Christine Torres for her diabetes education and support and follow Christine's dietary recommendations.  Pt will probably need NPH BID and meal time insulin further into her pregnancy.  I asked pt to get a BP monitor and to check her BP at home.  Pt has order for TSH.    2.  PREGNANCY: Pt is [id number] pregnant and followed by OB.     3.  Return to Endocrine Clinic to see me in 1 week.  Pt has my contact number to call if she has any questions.

## 2020-05-01 ENCOUNTER — VIRTUAL VISIT (OUTPATIENT)
Dept: ENDOCRINOLOGY | Facility: CLINIC | Age: 30
End: 2020-05-01
Payer: COMMERCIAL

## 2020-05-01 DIAGNOSIS — O24.414 INSULIN CONTROLLED GESTATIONAL DIABETES MELLITUS (GDM) IN SECOND TRIMESTER: Primary | ICD-10-CM

## 2020-05-07 NOTE — PROGRESS NOTES
"Timothy Crowell is a 29 year old female who is being evaluated via a billable telephone visit.      The patient has been notified of following:     \"This telephone visit will be conducted via a call between you and your physician/provider. We have found that certain health care needs can be provided without the need for a physical exam.  This service lets us provide the care you need with a short phone conversation.  If a prescription is necessary we can send it directly to your pharmacy.  If lab work is needed we can place an order for that and you can then stop by our lab to have the test done at a later time.    Telephone visits are billed at different rates depending on your insurance coverage. During this emergency period, for some insurers they may be billed the same as an in-person visit.  Please reach out to your insurance provider with any questions.    If during the course of the call the physician/provider feels a telephone visit is not appropriate, you will not be charged for this service.\"    Patient has given verbal consent for Telephone visit?  Yes    What phone number would you like to be contacted at? [phone]    How would you like to obtain your AVS? Elfego Lomas MA    Due to the COVID 19 pandemic this visit was converted to a telephone visit in order to help prevent spread of infection in this patient and the general population.    Time of start: 10:00 am  Time of end: 10:12 am  Total duration of telephone visit: 12 minutes.    This visit would have been billed as a [id number] visit today.    HPI  Timothy Crowell is a 29 year old female with gestational diabetes. Telephone visit today for diabetes follow up.   No previous hx of diabetes.  Pt was seen by Dr. Castro in our clinic on 4/8/2020.   This is Timothy's first pregnancy and she is currently [id number] pregnant.  Her SHELDON is 10/22/2020. Pt is being seen by OB at [place].  She has been followed closely by Christine Torres RD for " "diabetes education and support.  For her diabetes, she is currently taking NPH insulin 6 units subcutaneous at hs.  She has been doing a good job of checking her blood sugars fasting each am and 1 hr postmeals daily.  Her FBS values have been 103, 100, 95, 105, 91 and 94 for the past week.  Her 2 hr posbreakfast bs readings have been 163 ( had honey in warm water ) , 138, 110, 151 and 132.  Postlunch bs readings have been 141, 142,100 and 111.  Her postdinner blood sugars are 141, 108 and 135.  No hypoglycemia.  On ROS today, she continues to struggle with seasonal allergies- eyes are itchy and nasal congestion. She just started Claritin.  She denies SOB at rest, cough, fever, chills or chest pain.  No abd pain or diarrhea.  No dysuria or hematuria.  Patient denies vaginal spotting or bleeding.    ROS  Please see under HPI.    Allergies  Allergies   Allergen Reactions     Seasonal Allergies        Medications  Current Outpatient Medications   Medication Sig Dispense Refill     Blood Glucose Monitoring Suppl (ONETOUCH VERIO FLEX SYSTEM) w/Device KIT 1 each 4 times daily 1 kit 0     insulin NPH (NOVOLIN N VIAL) 100 UNIT/ML vial Inject 8 units subcutaneous at hs. 3 mL 3     insulin  UNIT/ML vial Inject 6 Units Subcutaneous At Bedtime 3 mL 3     insulin pen needle (BD CARINA U/F) 32G X 4 MM miscellaneous Use 1 daily as directed. 100 each 1     insulin syringe-needle U-100 (31G X 5/16\" 0.3 ML) 31G X 5/16\" 0.3 ML miscellaneous Use 1 syringes daily or as directed. 100 each 1     insulin syringe-needle U-100 (BD INSULIN SYRINGE ULTRAFINE) 31G X 5/16\" 0.3 ML miscellaneous Use daily. 100 each 3     OneTouch Delica Lancets 33G MISC 1 Box daily 100 each 1     ONETOUCH VERIO IQ test strip Use to test blood sugar 4 times daily or as directed.  Ok to substitute alternative if insurance prefers. 200 strip 3     Prenatal Vit-Fe Fumarate-FA (PRENATAL VITAMINS PO)          Family History  family history includes Cerebrovascular " Disease in her father; Hyperlipidemia in her sister; Liver Cancer in her mother.    Social History   reports that she has never smoked. She has never used smokeless tobacco. She reports previous alcohol use. She reports that she does not use drugs.     Past Medical History  None.    Physical Exam    No exam today- telephone visit.    RESULTS  Hemoglobin A1C   Date Value Ref Range Status   03/17/2020 5.9 (H) 0 - 5.6 % Final     Comment:     Normal <5.7% Prediabetes 5.7-6.4%  Diabetes 6.5% or higher - adopted from ADA   consensus guidelines.         ASSESSMENT/PLAN:    1.  GESTATIONAL DIABETES: Increase NPH 8 units subcutaneous at bedtime.    Pt is to continue to check her fasting blood sugar each am and also check her blood sugar 1 hr postmeal.  She is aware that we would like to see her FBS 95 or < and her 1 hr postmeal blood sugar 140 or <.  Hang is to continue to work with Christine Torres for her diabetes education and support and follow Christine's dietary recommendations.  Pt will probably need NPH BID and meal time insulin further into her pregnancy.  May need to add meal time insulin within the next few weeks.  I asked pt to get a BP monitor to check her BP at home.  Her BP is in the 110/60-73 range at home.  Will plan to check a TSH.    2.  PREGNANCY: Pt is [id number] pregnant and followed by OB at [place].    3.  Return to Endocrine Clinic to see me in 1 week.  Pt has my contact number to call if she has any questions.

## 2020-05-08 ENCOUNTER — VIRTUAL VISIT (OUTPATIENT)
Dept: ENDOCRINOLOGY | Facility: CLINIC | Age: 30
End: 2020-05-08
Payer: COMMERCIAL

## 2020-05-08 DIAGNOSIS — O24.414 INSULIN CONTROLLED GESTATIONAL DIABETES MELLITUS (GDM) IN FIRST TRIMESTER: ICD-10-CM

## 2020-05-08 RX ORDER — HUMAN INSULIN 100 [IU]/ML
INJECTION, SUSPENSION SUBCUTANEOUS
Qty: 3 ML | Refills: 3 | COMMUNITY
Start: 2020-05-08 | End: 2020-08-10

## 2020-05-08 NOTE — PATIENT INSTRUCTIONS
Increase NPH 8 units at bedtime.  I will call you next Friday 5/15/2020 at 10 am.  Alma Salamanca

## 2020-05-11 ENCOUNTER — PRENATAL OFFICE VISIT (OUTPATIENT)
Dept: OBGYN | Facility: CLINIC | Age: 30
End: 2020-05-11
Payer: COMMERCIAL

## 2020-05-11 DIAGNOSIS — K59.00 CONSTIPATION, UNSPECIFIED CONSTIPATION TYPE: ICD-10-CM

## 2020-05-11 DIAGNOSIS — Z34.92 PRENATAL CARE IN SECOND TRIMESTER: Primary | ICD-10-CM

## 2020-05-11 DIAGNOSIS — E11.9 TYPE 2 DIABETES MELLITUS WITHOUT COMPLICATION, WITHOUT LONG-TERM CURRENT USE OF INSULIN (H): ICD-10-CM

## 2020-05-11 PROCEDURE — 99207 ZZC PRENATAL VISIT: CPT | Performed by: OBSTETRICS & GYNECOLOGY

## 2020-05-11 NOTE — PATIENT INSTRUCTIONS
Do not take Claritin-D.  You can take regular Claritin without the decongestant.  You may use saline nasal spray for nasal congestion during pregnancy.    Alternative to using icy hot for your neck pain is a heating pad on the neck.    Do not sleep flat on your back in pregnancy.  It is best to sleep on your side.  If you have trouble doing this a pregnancy pillow can be helpful.      You can take tylenol for headaches in pregnancy.    Normal weight gain in pregnancy for your starting body mass index is 15-25lbs    Normal blood pressure in pregnancy is <140/90  Patient Education     Healthy Eating Habits During Pregnancy    It s important to develop healthy eating habits while you are pregnant, for you as well as for your baby. Here are some ways to stay healthy.  Aim for a healthy weight  A slow, steady rate of weight gain is often best. After the first trimester, you may gain about a pound a week. If you were overweight before pregnancy, you need to gain fewer pounds. Your healthcare provider can give you a healthy weight goal for your pregnancy.  Don t diet  Now is not the time to diet. You may not get enough of the nutrients you and your baby need. Instead, learn how to be a healthy eater. Start by doing it for your baby. Soon, you may do it for yourself.  Vitamins and supplements  Talk with your healthcare provider about taking these and other prenatal vitamins and supplements.    Iron makes the extra blood you need now.    Calcium and vitamin D help build and keep strong bones.    Folic acid helps prevent certain birth defects.    Iodine helps the thyroid work right.    Some vitamins may not be safe to take. Your healthcare provider will tell you which ones to avoid.  Fluids  Drink at least 8 to 10 cups of fluid daily. Your baby needs fluids. Fluids also decrease constipation, flush out toxins and waste, limit swelling, and help prevent bladder infections. Water is best. Other good choices are:    Water or  seltzer water with a slice of lemon or lime (These can also help ease an upset stomach.)    Clear soups that are low in salt    Low-fat or fat-free milk, soy or rice milk with calcium added    Popsicles or gelatin  Things to avoid  Some things might harm your growing baby. Don t eat or drink:    Alcohol    Unpasteurized dairy foods and juices    Raw or undercooked meat, poultry, fish, or eggs    Unwashed fruits and vegetables    Prepared meats, like deli meats or hot dogs, unless heated until steaming hot    Fish that are high in mercury, like shark, swordfish, yola mackerel, tilefish, and albacore tuna  Things to limit  Ask your healthcare provider whether it s safe to eat or drink:    Caffeine    Artificial sweeteners    Organ meats    Certain types of fish    Fish and shellfish that contain mercury in lower amounts, like shrimp, canned light tuna, salmon, pollock, and catfish  Date Last Reviewed: 2/1/2018 2000-2019 [employer]. [address] [place] [address]. All rights reserved. This information is not intended as a substitute for professional medical care. Always follow your healthcare professional's instructions.           Patient Education     Comfort Tips During Pregnancy    Talk with your healthcare provider before using pain-relieving medicine at any time during your pregnancy.  First trimester tips  Nausea    Get up slowly. Eat a few unsalted crackers before you get out of bed.    Avoid smells that bother you.    Eat small bland low fat, light high-protein meals at frequent intervals.    Sip on water, weak tea, or clear soft drinks, like ginger ale. Eat ice chips.  Fatigue    Take catnaps when you can.    Get regular exercise.    Accept help from others.    Practice good sleep habits, like going to bed and getting up at the same time each day. Use your bed only for sleep and sex.  Mood swings    Talk about your feelings with others, including other mothers.    Limit sugar,  chocolate, and caffeine.    Eat a healthy diet. Don t skip meals.    Get regular exercise.  Headaches    Get fresh air and exercise.    Relax and get enough rest.    Check with your healthcare provider before taking any pain medicines.  Second trimester tips  Here are some suggestions to help you cope:    To limit ankle swelling, sit with your feet raised or wear support hose.    If you have pain in your groin and stomach (round ligament pain), avoid sudden twisting movements.    For leg cramps, flexing your foot often brings immediate relief. You also may try massaging your calf in long, downward strokes, or stretching your legs before going to bed. Get enough exercise and wear shoes with flexible soles.  Third trimester tips  Reducing heartburn    Eat small, light meals throughout the day rather than 3 large ones.    Sleep with your upper body raised 6 inches. Don t lie down until 2 hours after you eat.    Don't eat greasy, fried, or spicy foods.    Avoid citrus fruits and juices.  Treating constipation    Eat foods high in fiber (whole-grain foods, fresh fruit and vegetables).    Drink plenty of water.    Get regular exercise.    Discuss other medicines (like docusate and psyllium) with your healthcare provider.  Taking care of your breasts    Avoid using harsh soaps or alcohol, which can cause excessive dryness.    Wear nursing bras. They provide more support than regular bras and can be used after pregnancy if you breastfeed.  Getting a good night s sleep    Take a warm shower before bed.    Sleep on a firm mattress.    Lie on your side with 1 leg crossed over the other.    Use pillows to support arms, legs, and belly.  Date Last Reviewed: 10/1/2017    1049-4642 [employer]. [address] [place] [address], [place] [address]. All rights reserved. This information is not intended as a substitute for professional medical care. Always follow your healthcare professional's instructions.           Patient  Education     Adapting to Pregnancy: Second Trimester    Keep up the healthy habits you started in your first trimester. You might be a little more tired than normal. So plan your day wisely. Look at the tips below and choose the ones that suit your lifestyle.  If you have any questions, check with your healthcare provider.  If you work  If you can, adjust your work with your employer to fit your needs. Try these tips:    If you stand for long periods, find ways to do some tasks while sitting. Also, try to stand with 1 foot resting on a low stool or ledge. Shift your weight from foot to foot often. Wear low-heeled shoes.    If you sit, keep your knees level with your hips. Rest your feet on a firm surface. Sit tall with support for your low back.    If you work long hours, ask about adjusting your schedule. Try taking shorter breaks more often.  When you travel  The second trimester may be the best time for any travel. Talk to your healthcare provider about any special plans you may need to make. Always:    Wear a seat belt. Fasten the lap part under your belly. Wear the shoulder part also.    Take breaks often during long trips by car or plane. Move around to stretch your legs.    Drink plenty of fluids on flights. The air in plane cabins is very dry.    Avoid hot climates or high altitudes if you are not used to them.    Avoid places where the food and water might make you sick.    Make sure you are up-to-date on all immunizations, including the flu vaccine. This is especially important when traveling overseas.  Taking time to relax  Find time to rest and relax at work or at home:    Take short time-outs daily. Do relaxation exercises.    Breathe deeply during stressful times.    Try not to take on too much. Plan tasks for times when you have the most energy.    Take naps when you can. Or just sit and relax.    After week 16, avoid lying on your back for more than a few minutes. Instead, lie on your side. Switch  sides often.  Continuing as lovers  Unless your healthcare provider tells you otherwise, there is no reason to stop having sex now. Blood supply increases to the pelvic area in the second trimester. Because of this, sex might be more enjoyable. Try different positions and see what s best. Also, talk to your partner about any changes in desire. Spotting may happen after sex. Be sure to let your healthcare provider know if there is heavy bleeding.  Keeping your environment safe  You can still clean house and use scented products. Just take some simple precautions:    Wear gloves when using cleaning fluids.    Open windows to let in fresh air. Use a fan if you paint.    Avoid secondhand smoke.    Don t breathe fumes from nail polish, hair spray, cleansers, or other chemicals.  Date Last Reviewed: 1/1/2018 2000-2019 [employer]. [address] [place] [address]. All rights reserved. This information is not intended as a substitute for professional medical care. Always follow your healthcare professional's instructions.           Patient Education     Pregnancy: Your Second Trimester Changes  Each day, you and your baby are changing and growing together. Here s a quick look at what s happening to both of you.  How you are changing  Even when you don t notice it, your body is adapting to meet the needs of your growing baby. The changes in your body might also affect your moods.  Your body  Your uterus expands as baby grows. As the weeks go by, you will feel more pressure on your bladder, stomach, and other organs. You may notice some skin color changes on your forehead, nose, or cheeks. Freckles may darken, and moles may grow. You may notice a darker line on your abdomen between your belly button and pubic bone in the midline.  Your moods  The second trimester is often easier than the first. Still, be prepared for mood swings. These are due to the increase in hormones (chemicals that affect the  way organs work) produced by your body. These mood swings are a normal part of pregnancy.  How your baby is growing          Month 4  Baby s heartbeat may be heard with a Doppler (hand-held ultrasound device) by 9 to 10 weeks. Eyebrows, eyelashes, and fingernails begin to form.  Month 5  You may feel your baby move. After a growth spurt, your baby nears 10 inches. Month 6  Baby s fingerprints have formed. Your baby weighs about 1 to 2 pounds and is about 12 inches long.   Date Last Reviewed: 1/1/2018 2000-2019 [employer]. [address] [place] [address], [place] [address]. All rights reserved. This information is not intended as a substitute for professional medical care. Always follow your healthcare professional's instructions.

## 2020-05-11 NOTE — PROGRESS NOTES
Telephone Encounter     Phone visit for modified prenatal care for COVID.    Start time:1115  Stop time:1141    [facility] [place] [place]    CC: phone prenatal visit.    S:  Patient reports that she has been having some constipation.  Two days ago she had abdominal pain mostly on the right side and then in the rest of her abdomen.  She reports this felt like constipation pain.  She also had a spicy meal prior to this discomfort.  Patient denies any low abdominal cramping, vaginal bleeding, abnormal vaginal discharge. She sometimes has headaches at night that resolve with sleep.  She also notes a stuffy nose due to allergies.  She has had some instances of left upper (just below left collar bone) chest tightness that happens 1-2x per day over the past 4 days.  She has taken her pulse when this happens and it is normal (80-90 bpm).  It typically happens about 3 or 4 p.m.  It resolves spontaneously  She does not think she has acid reflux.  She denies any fever, chills, cough, shortness of breath, chest pressure, nausea, or vomiting.      Pregnancy notable for:  -Type 2 DM    Patient reports home BP of 90's/50's    A&P: Timothy Crowell is a 29 year old  at 16w0d by 7w2d us who presents today for routine phone prenatal visit.    (Z34.92) Prenatal care in second trimester  (primary encounter diagnosis)  Comment: Reviewed patient's extensive list of questions from [employer] message and answered with written reminders included in her end of visit summary.  Discussed avoiding claritin D and using regular claritin.  Avoid extended use (>3) days of afrin.  Alternative to nasal congestion is saline nasal spray.  Reviewed healthy diet, exercise, and weight gain in pregnancy.  Advised not to sleep on back.  Discussed normal BP in pregnancy.  Chest discomfort likely acid reflux vs. MSK pain.  Advised to contact and come in for exam if persistent or worsens.  Recommend trying tums and if that helps with symptoms it  "is likely acid reflux.   Plan: In person clinic visit on 6/2/2020 with MD.  Message sent to RN pool to switch from MARYELLEN to MD.  Anatomy ultrasound that day.    Reviewed COVID19 visitor restrictions (including during ultrasound) and face mask use.  Patient's partner asked about \"third party ultrasound with 4D\" as alternate.  Explained that we are doing a medical anatomy ultrasound and if \"third party\" is equivalent we could potentially use it.  Equivalent scans would most likely be done at [facility], [facility].  Unable to know details of other \"third party\" standard and could just be for sex identification.  Reviewed at length visitor policy in clinic and hospital with patient's partner.  Reviewed genetic testing options and patient declines.    (E11.9) Type 2 diabetes mellitus without complication, without long-term current use of insulin (H)  Comment: Doing well with insulin injections.  Following with DE with last visit 5/8.   Plan: Next visit 5/15    (K59.00) Constipation, unspecified constipation type  Comment: Reviewed diet changes for help with constipation  Plan: Patient will continue dietary adjustments.      Charissa Garcia MD    "

## 2020-05-12 ENCOUNTER — MYC MEDICAL ADVICE (OUTPATIENT)
Dept: OBGYN | Facility: CLINIC | Age: 30
End: 2020-05-12

## 2020-05-13 ENCOUNTER — DOCUMENTATION ONLY (OUTPATIENT)
Dept: CARE COORDINATION | Facility: CLINIC | Age: 30
End: 2020-05-13

## 2020-05-13 NOTE — TELEPHONE ENCOUNTER
She can take afrin 3-4 days maximum.  I would only have her use it every few weeks.  Yes plain pseudoephedrine can be used in the second and third trimester (should be avoided in first trimester and with breast feeding).  She can also try benadryl for allergy symptoms and I would encourage her to continue using saline nasal spray.    ThanksCharissa     She could also try flonase or nasonex

## 2020-05-13 NOTE — TELEPHONE ENCOUNTER
Are you able to prescribe pt something for her congestion? She has tried Afrin nasal spray. See [employer] message.   Shalonda Powell, KYLER-BSN

## 2020-05-14 NOTE — PROGRESS NOTES
"Timothy Crowell is a 29 year old female who is being evaluated via a billable telephone visit.      The patient has been notified of following:     \"This telephone visit will be conducted via a call between you and your physician/provider. We have found that certain health care needs can be provided without the need for a physical exam.  This service lets us provide the care you need with a short phone conversation.  If a prescription is necessary we can send it directly to your pharmacy.  If lab work is needed we can place an order for that and you can then stop by our lab to have the test done at a later time.    Telephone visits are billed at different rates depending on your insurance coverage. During this emergency period, for some insurers they may be billed the same as an in-person visit.  Please reach out to your insurance provider with any questions.    If during the course of the call the physician/provider feels a telephone visit is not appropriate, you will not be charged for this service.\"    Patient has given verbal consent for Telephone visit?  Yes    What phone number would you like to be contacted at? [phone]    How would you like to obtain your AVS? locr     Patients Glucose Data was Sent in [email or web address], [place]    Due to the COVID 19 pandemic this visit was converted to a telephone visit in order to help prevent spread of infection in this patient and the general population.    Time of start: 10:00 am   Time of end: 10:12 am     JIMMY Crowell is a 29 year old female with gestational diabetes. Telephone visit today for diabetes follow up.   No previous hx of diabetes.  Pt was seen by Dr. Castro in our clinic on 4/8/2020.   This is Timothy's first pregnancy and she is currently [id number] pregnant.  Her SHELDON is 10/22/2020. Pt is being seen by OB at [facility].  She has been followed by Christine Torres RD for diabetes education and support.  For her diabetes, she is currently " "taking NPH insulin 8 units subcutaneous at hs.  She recently ran out of test strips and her  his picking up glucose test strips for Hang today.  Her FBS values have been 87, 83, 86, 78,81 and 84 for the past week.  Her 1 hr postmeal bs are < 140.  No hypoglycemia.  On ROS today, less problems with allergies this week.  She denies SOB at rest, cough, fever, chills or chest pain.  No abd pain or diarrhea. No dysuria or hematuria.  Patient denies vaginal spotting or bleeding.    ROS  Please see under HPI.    Allergies  Allergies   Allergen Reactions     Seasonal Allergies        Medications  Current Outpatient Medications   Medication Sig Dispense Refill     Blood Glucose Monitoring Suppl (ONETOUCH VERIO FLEX SYSTEM) w/Device KIT 1 each 4 times daily 1 kit 0     insulin NPH (NOVOLIN N VIAL) 100 UNIT/ML vial Inject 8 units subcutaneous at hs. 3 mL 3     insulin  UNIT/ML vial Inject 6 Units Subcutaneous At Bedtime 3 mL 3     insulin pen needle (BD CARINA U/F) 32G X 4 MM miscellaneous Use 1 daily as directed. 100 each 1     insulin syringe-needle U-100 (31G X 5/16\" 0.3 ML) 31G X 5/16\" 0.3 ML miscellaneous Use 1 syringes daily or as directed. 100 each 1     insulin syringe-needle U-100 (BD INSULIN SYRINGE ULTRAFINE) 31G X 5/16\" 0.3 ML miscellaneous Use daily. 100 each 3     OneTouch Delica Lancets 33G MISC 1 Box daily 100 each 1     ONETOUCH VERIO IQ test strip Use to test blood sugar 4 times daily or as directed.  Ok to substitute alternative if insurance prefers. 200 strip 3     Phenylephrine HCl (AFRIN ALLERGY NA)        Prenatal Vit-Fe Fumarate-FA (PRENATAL VITAMINS PO)          Family History  family history includes Cerebrovascular Disease in her father; Hyperlipidemia in her sister; Liver Cancer in her mother.    Social History   reports that she has never smoked. She has never used smokeless tobacco. She reports previous alcohol use. She reports that she does not use drugs.     Past Medical " History  None.    Physical Exam    No exam today- telephone visit.    RESULTS  Hemoglobin A1C   Date Value Ref Range Status   03/17/2020 5.9 (H) 0 - 5.6 % Final     Comment:     Normal <5.7% Prediabetes 5.7-6.4%  Diabetes 6.5% or higher - adopted from ADA   consensus guidelines.         ASSESSMENT/PLAN:    1.  GESTATIONAL DIABETES: Continue NPH 8 units subcutaneous at bedtime.  Her  is picking up glucose test strips today.  Pt is to continue to check her fasting blood sugar each am and also check her blood sugar 1 hr postmeal.  She is aware that we would like to see her FBS 95 or < and her 1 hr postmeal blood sugar 140 or <.  Hang is to continue to work with Christine Torres for her diabetes education and support and follow Christine's dietary recommendations.  Pt will probably need NPH BID and meal time insulin further into her pregnancy.    I asked pt to get a BP monitor to check her BP at home.   TSH ordered.    2.  PREGNANCY: Pt is [id number] pregnant and followed by OB at [place].    3.  Return to Endocrine Clinic to see me in 1 week.  Pt has my contact number to call if she has any questions.

## 2020-05-15 ENCOUNTER — VIRTUAL VISIT (OUTPATIENT)
Dept: ENDOCRINOLOGY | Facility: CLINIC | Age: 30
End: 2020-05-15
Payer: COMMERCIAL

## 2020-05-15 DIAGNOSIS — O24.414 INSULIN CONTROLLED GESTATIONAL DIABETES MELLITUS (GDM) IN SECOND TRIMESTER: Primary | ICD-10-CM

## 2020-05-20 NOTE — PROGRESS NOTES
"Timothy Crowell is a 30 year old female who is being evaluated via a billable telephone visit.      The patient has been notified of following:     \"This telephone visit will be conducted via a call between you and your physician/provider. We have found that certain health care needs can be provided without the need for a physical exam.  This service lets us provide the care you need with a short phone conversation.  If a prescription is necessary we can send it directly to your pharmacy.  If lab work is needed we can place an order for that and you can then stop by our lab to have the test done at a later time.    Telephone visits are billed at different rates depending on your insurance coverage. During this emergency period, for some insurers they may be billed the same as an in-person visit.  Please reach out to your insurance provider with any questions.    If during the course of the call the physician/provider feels a telephone visit is not appropriate, you will not be charged for this service.\"    Patient has given verbal consent for Telephone visit?  Yes    What phone number would you like to be contacted at? [phone]    How would you like to obtain your AVS? Elfego Lomas MA    Due to the COVID 19 pandemic this visit was converted to a telephone visit in order to help prevent spread of infection in this patient and the general population.    Time of start: 10:30 am  Time of end: 10: 40 am  Total duration of telephone visit: 10 minutes.      HPI  Timothy Crowell is a 30 year old female with gestational diabetes. Telephone visit today for diabetes follow up.   No previous hx of diabetes.  Pt was seen by Dr. Castro in our clinic on 4/8/2020.   This is Timothy's first pregnancy and she is currently [id number] pregnant.  Her SHELDON is 10/22/2020. Pt is being seen by OB at [place].  She has been followed by Christine Torres RD for diabetes education and support.  For her diabetes, she is currently " "taking NPH insulin 8 units subcutaneous at hs.  Her FBS values have been 79, 81, 78, 81 and 82 for the past week.  Her 1 hr postmeal bs are < 140, expect for 1 blood sugar value of 143.  No hypoglycemia.  On ROS today,doing better overall. No seasonal allergy issues at this time.  She denies SOB at rest, cough, fever, chills or chest pain.  No abd pain or diarrhea. No dysuria or hematuria.  Patient denies vaginal spotting or bleeding.    ROS  Please see under HPI.    Allergies  Allergies   Allergen Reactions     Seasonal Allergies        Medications  Current Outpatient Medications   Medication Sig Dispense Refill     Blood Glucose Monitoring Suppl (ONETOUCH VERIO FLEX SYSTEM) w/Device KIT 1 each 4 times daily 1 kit 0     insulin NPH (NOVOLIN N VIAL) 100 UNIT/ML vial Inject 8 units subcutaneous at hs. 3 mL 3     insulin pen needle (BD CARINA U/F) 32G X 4 MM miscellaneous Use 1 daily as directed. 100 each 1     insulin syringe-needle U-100 (31G X 5/16\" 0.3 ML) 31G X 5/16\" 0.3 ML miscellaneous Use 1 syringes daily or as directed. 100 each 1     insulin syringe-needle U-100 (BD INSULIN SYRINGE ULTRAFINE) 31G X 5/16\" 0.3 ML miscellaneous Use daily. 100 each 3     OneTouch Delica Lancets 33G MISC 1 Box daily 100 each 1     ONETOUCH VERIO IQ test strip Use to test blood sugar 4 times daily or as directed.  Ok to substitute alternative if insurance prefers. 200 strip 3     Phenylephrine HCl (AFRIN ALLERGY NA)        Prenatal Vit-Fe Fumarate-FA (PRENATAL VITAMINS PO)          Family History  family history includes Cerebrovascular Disease in her father; Hyperlipidemia in her sister; Liver Cancer in her mother.    Social History   reports that she has never smoked. She has never used smokeless tobacco. She reports previous alcohol use. She reports that she does not use drugs.     Past Medical History  None.    Physical Exam    No exam today- telephone visit.    RESULTS  Hemoglobin A1C   Date Value Ref Range Status   03/17/2020 " 5.9 (H) 0 - 5.6 % Final     Comment:     Normal <5.7% Prediabetes 5.7-6.4%  Diabetes 6.5% or higher - adopted from ADA   consensus guidelines.         ASSESSMENT/PLAN:    1.  GESTATIONAL DIABETES: Continue NPH 8 units subcutaneous at bedtime.  Pt is to continue to check her fasting blood sugar each am and also check her blood sugar 1 hr postmeal.  She is aware that we would like to see her FBS 95 or < and her 1 hr postmeal blood sugar 140 or <.  Hang is to continue to work with Christine Torres for her diabetes education and support and follow Christine's dietary recommendations.  Pt will probably need NPH BID and meal time insulin further into her pregnancy.    I asked pt to get a BP monitor to check her BP at home.   TSH and A1C  ordered.    2.  PREGNANCY: Pt is [id number] pregnant and followed by OB at [place].    3.  FOLLOW UP: with me in 2 weeks.  Pt has my contact number to call if she has any questions or if her blood sugar values are above target.

## 2020-05-21 ENCOUNTER — VIRTUAL VISIT (OUTPATIENT)
Dept: ENDOCRINOLOGY | Facility: CLINIC | Age: 30
End: 2020-05-21
Payer: COMMERCIAL

## 2020-05-21 DIAGNOSIS — O24.414 INSULIN CONTROLLED GESTATIONAL DIABETES MELLITUS (GDM) IN SECOND TRIMESTER: Primary | ICD-10-CM

## 2020-05-22 DIAGNOSIS — E11.9 TYPE 2 DIABETES MELLITUS WITHOUT COMPLICATION, WITHOUT LONG-TERM CURRENT USE OF INSULIN (H): ICD-10-CM

## 2020-05-22 RX ORDER — LANCETS 33 GAUGE
EACH MISCELLANEOUS
Qty: 100 EACH | Status: SHIPPED | OUTPATIENT
Start: 2020-05-22 | End: 2020-06-22

## 2020-06-02 ENCOUNTER — ANCILLARY PROCEDURE (OUTPATIENT)
Dept: ULTRASOUND IMAGING | Facility: CLINIC | Age: 30
End: 2020-06-02
Attending: ADVANCED PRACTICE MIDWIFE
Payer: COMMERCIAL

## 2020-06-02 ENCOUNTER — PRENATAL OFFICE VISIT (OUTPATIENT)
Dept: OBGYN | Facility: CLINIC | Age: 30
End: 2020-06-02
Attending: ADVANCED PRACTICE MIDWIFE
Payer: COMMERCIAL

## 2020-06-02 VITALS
TEMPERATURE: 97.8 F | HEART RATE: 93 BPM | BODY MASS INDEX: 26.37 KG/M2 | DIASTOLIC BLOOD PRESSURE: 60 MMHG | WEIGHT: 135 LBS | SYSTOLIC BLOOD PRESSURE: 110 MMHG

## 2020-06-02 DIAGNOSIS — R73.09 ELEVATED HEMOGLOBIN A1C: ICD-10-CM

## 2020-06-02 DIAGNOSIS — Z79.4 TYPE 2 DIABETES MELLITUS WITHOUT COMPLICATION, WITH LONG-TERM CURRENT USE OF INSULIN (H): ICD-10-CM

## 2020-06-02 DIAGNOSIS — Z34.02 SUPERVISION OF NORMAL FIRST PREGNANCY IN SECOND TRIMESTER: Primary | ICD-10-CM

## 2020-06-02 DIAGNOSIS — E11.9 TYPE 2 DIABETES MELLITUS WITHOUT COMPLICATION, WITH LONG-TERM CURRENT USE OF INSULIN (H): ICD-10-CM

## 2020-06-02 LAB
ALT SERPL W P-5'-P-CCNC: 16 U/L (ref 0–50)
AST SERPL W P-5'-P-CCNC: 11 U/L (ref 0–45)
CREAT SERPL-MCNC: 0.47 MG/DL (ref 0.52–1.04)
CREAT UR-MCNC: 9 MG/DL
GFR SERPL CREATININE-BSD FRML MDRD: >90 ML/MIN/{1.73_M2}
PROT UR-MCNC: <0.05 G/L
PROT/CREAT 24H UR: NORMAL G/G CR (ref 0–0.2)

## 2020-06-02 PROCEDURE — 81511 FTL CGEN ABNOR FOUR ANAL: CPT | Mod: 90 | Performed by: OBSTETRICS & GYNECOLOGY

## 2020-06-02 PROCEDURE — 84156 ASSAY OF PROTEIN URINE: CPT | Performed by: OBSTETRICS & GYNECOLOGY

## 2020-06-02 PROCEDURE — 36415 COLL VENOUS BLD VENIPUNCTURE: CPT | Performed by: OBSTETRICS & GYNECOLOGY

## 2020-06-02 PROCEDURE — 76805 OB US >/= 14 WKS SNGL FETUS: CPT | Performed by: OBSTETRICS & GYNECOLOGY

## 2020-06-02 PROCEDURE — 84450 TRANSFERASE (AST) (SGOT): CPT | Performed by: OBSTETRICS & GYNECOLOGY

## 2020-06-02 PROCEDURE — 99000 SPECIMEN HANDLING OFFICE-LAB: CPT | Performed by: OBSTETRICS & GYNECOLOGY

## 2020-06-02 PROCEDURE — 99207 ZZC PRENATAL VISIT: CPT | Performed by: OBSTETRICS & GYNECOLOGY

## 2020-06-02 PROCEDURE — 82565 ASSAY OF CREATININE: CPT | Performed by: OBSTETRICS & GYNECOLOGY

## 2020-06-02 PROCEDURE — 84460 ALANINE AMINO (ALT) (SGPT): CPT | Performed by: OBSTETRICS & GYNECOLOGY

## 2020-06-02 NOTE — PROGRESS NOTES
19w1d  85-90s fasting  1h after meals all <140  Taking insulin 8u at bedtime  Not yet feeling movement. No cramping or bleeding. Feeling good.  Fetal survey today wnl, Excited she is having a girl!    1. Supervision of normal first pregnancy in second trimester  - Maternal Quad Marker 2nd Trimester  - AST  - ALT  - Creatinine  - Protein  random urine with Creat Ratio  - aspirin (ASA) 81 MG EC tablet; Take 1 tablet (81 mg) by mouth daily  Dispense: 100 tablet; Refill: 1  - Creatinine urine calculation only  - MAT FETAL MED CTR REFERRAL-PREGNANCY    2. Type 2 diabetes mellitus without complication, with long-term current use of insulin (H)  Due to type 2 DM on insulin, recommend level 2 US with MFM and fetal echo.   Will get baseline pre-E labs today and recommend she start ASA 81mg to help prevent pre-E.  - AST  - ALT  - Creatinine  - Protein  random urine with Creat Ratio  - aspirin (ASA) 81 MG EC tablet; Take 1 tablet (81 mg) by mouth daily  Dispense: 100 tablet; Refill: 1  - MAT FETAL MED CTR REFERRAL-PREGNANCY    Phone visit in 4 weeks.  Isabella Dia MD

## 2020-06-02 NOTE — PROGRESS NOTES
"Timothy Crowell is a 30 year old female who is being evaluated via a billable telephone visit.      The patient has been notified of following:     \"This telephone visit will be conducted via a call between you and your physician/provider. We have found that certain health care needs can be provided without the need for a physical exam.  This service lets us provide the care you need with a short phone conversation.  If a prescription is necessary we can send it directly to your pharmacy.  If lab work is needed we can place an order for that and you can then stop by our lab to have the test done at a later time.    Telephone visits are billed at different rates depending on your insurance coverage. During this emergency period, for some insurers they may be billed the same as an in-person visit.  Please reach out to your insurance provider with any questions.    If during the course of the call the physician/provider feels a telephone visit is not appropriate, you will not be charged for this service.\"    Patient has given verbal consent for Telephone visit?  Yes    What phone number would you like to be contacted at? [phone]      How would you like to obtain your AVS? MyChart     Due to the COVID 19 pandemic this visit was converted to a telephone visit in order to help prevent spread of infection in this patient and the general population.    Time of start: 8:30 am  Time of end: 8:42 am    Total duration of telephone visit: 12 minutes.    JIMMY Crowell is a 30 year old female with gestational diabetes. Telephone visit today for diabetes follow up.   No previous hx of diabetes.  Pt was seen by Dr. Castro in our clinic on 4/8/2020.   This is Timothy's first pregnancy and she is currently [id number] pregnant.  Her SHELDON is 10/22/2020. Pt is being seen by OB at [place].  She has been followed by Christine Torres RD for diabetes education and support.  For her diabetes, she is currently taking NPH insulin 8 " "units subcutaneous at hs.  Her FBS values have been 86, 83, 86, 86, 87 and 84.  She only had two 1 hr postmeal blood sugars > 140. She missed her NPH dose once evening.  No hypoglycemia.  On ROS today,doing better overall.   She denies SOB at rest, cough, fever, chills or chest pain.  No abd pain or diarrhea. No dysuria or hematuria.  Patient denies vaginal spotting or bleeding.    ROS  Please see under HPI.    Allergies  Allergies   Allergen Reactions     Seasonal Allergies        Medications  Current Outpatient Medications   Medication Sig Dispense Refill     aspirin (ASA) 81 MG EC tablet Take 1 tablet (81 mg) by mouth daily 100 tablet 1     Blood Glucose Monitoring Suppl (ONETOUCH VERIO FLEX SYSTEM) w/Device KIT 1 each 4 times daily 1 kit 0     insulin NPH (NOVOLIN N VIAL) 100 UNIT/ML vial Inject 8 units subcutaneous at hs. 3 mL 3     insulin pen needle (BD CARINA U/F) 32G X 4 MM miscellaneous Use 1 daily as directed. 100 each 1     insulin syringe-needle U-100 (31G X 5/16\" 0.3 ML) 31G X 5/16\" 0.3 ML miscellaneous Use 1 syringes daily or as directed. 100 each 1     insulin syringe-needle U-100 (BD INSULIN SYRINGE ULTRAFINE) 31G X 5/16\" 0.3 ML miscellaneous Use daily. 100 each 3     Lancets (ONETOUCH DELICA PLUS [id number]) MISC USE AS DIRECTED ONCE DAILY 100 each prn     ONETOUCH VERIO IQ test strip Use to test blood sugar 4 times daily or as directed.  Ok to substitute alternative if insurance prefers. 200 strip 3     Phenylephrine HCl (AFRIN ALLERGY NA)        Prenatal Vit-Fe Fumarate-FA (PRENATAL VITAMINS PO)          Family History  family history includes Cerebrovascular Disease in her father; Hyperlipidemia in her sister; Liver Cancer in her mother.    Social History   reports that she has never smoked. She has never used smokeless tobacco. She reports previous alcohol use. She reports that she does not use drugs.     Past Medical History  None.    Physical Exam    No exam today- telephone " visit.    RESULTS  Creatinine   Date Value Ref Range Status   06/02/2020 0.47 (L) 0.52 - 1.04 mg/dL Final     GFR Estimate   Date Value Ref Range Status   06/02/2020 >90 >60 mL/min/[1.73_m2] Final     Comment:     Non  GFR Calc  Starting 12/18/2018, serum creatinine based estimated GFR (eGFR) will be   calculated using the Chronic Kidney Disease Epidemiology Collaboration   (CKD-EPI) equation.       Hemoglobin A1C   Date Value Ref Range Status   03/17/2020 5.9 (H) 0 - 5.6 % Final     Comment:     Normal <5.7% Prediabetes 5.7-6.4%  Diabetes 6.5% or higher - adopted from ADA   consensus guidelines.       ALT   Date Value Ref Range Status   06/02/2020 16 0 - 50 U/L Final     AST   Date Value Ref Range Status   06/02/2020 11 0 - 45 U/L Final       ASSESSMENT/PLAN:    1.  GESTATIONAL DIABETES: Continue NPH 8 units subcutaneous at bedtime.  Pt is to continue to check her fasting blood sugar each am and also check her blood sugar 1 hr postmeal.  She is aware that we would like to see her FBS 95 or < and her 1 hr postmeal blood sugar 140 or <.  Timothy is to continue to work with Christine Torres for her diabetes education and support and follow Christine's dietary recommendations.  Pt will probably need NPH BID and meal time insulin further into her pregnancy.    Timothy was seen by OB staff yesterday and her BP was good 110/60.   TSH and A1C  ordered.    2.  PREGNANCY: Pt is [id number] pregnant and followed by OB at [place].    3.  FOLLOW UP: with me on 6/10/2020.  Pt has my contact number to call if she has any questions or if her blood sugar values are above target.

## 2020-06-03 ENCOUNTER — VIRTUAL VISIT (OUTPATIENT)
Dept: ENDOCRINOLOGY | Facility: CLINIC | Age: 30
End: 2020-06-03
Payer: COMMERCIAL

## 2020-06-03 DIAGNOSIS — O24.414 INSULIN CONTROLLED GESTATIONAL DIABETES MELLITUS (GDM) IN SECOND TRIMESTER: Primary | ICD-10-CM

## 2020-06-03 NOTE — LETTER
"6/3/2020       RE: Timothy Crowell  [address]  [place] [address]     Dear Colleague,    Thank you for referring your patient, Timothy Crowell, to the [facility] at [facility]. Please see a copy of my visit note below.    Timothy Crowell is a 30 year old female who is being evaluated via a billable telephone visit.      The patient has been notified of following:     \"This telephone visit will be conducted via a call between you and your physician/provider. We have found that certain health care needs can be provided without the need for a physical exam.  This service lets us provide the care you need with a short phone conversation.  If a prescription is necessary we can send it directly to your pharmacy.  If lab work is needed we can place an order for that and you can then stop by our lab to have the test done at a later time.    Telephone visits are billed at different rates depending on your insurance coverage. During this emergency period, for some insurers they may be billed the same as an in-person visit.  Please reach out to your insurance provider with any questions.    If during the course of the call the physician/provider feels a telephone visit is not appropriate, you will not be charged for this service.\"    Patient has given verbal consent for Telephone visit?  Yes    What phone number would you like to be contacted at? [phone]      How would you like to obtain your AVS? MyChart     Due to the COVID 19 pandemic this visit was converted to a telephone visit in order to help prevent spread of infection in this patient and the general population.    Time of start: 8:30 am  Time of end: 8:42 am    Total duration of telephone visit: 12 minutes.    HPI  Timothy Crowell is a 30 year old female with gestational diabetes. Telephone visit today for diabetes follow up.   No previous hx of diabetes.  Pt was seen by Dr." "Matthew in our clinic on 4/8/2020.   This is Timothy's first pregnancy and she is currently [id number] pregnant.  Her SHELDON is 10/22/2020. Pt is being seen by OB at [place].  She has been followed by Christine Torres RD for diabetes education and support.  For her diabetes, she is currently taking NPH insulin 8 units subcutaneous at hs.  Her FBS values have been 86, 83, 86, 86, 87 and 84.  She only had two 1 hr postmeal blood sugars > 140. She missed her NPH dose once evening.  No hypoglycemia.  On ROS today,doing better overall.   She denies SOB at rest, cough, fever, chills or chest pain.  No abd pain or diarrhea. No dysuria or hematuria.  Patient denies vaginal spotting or bleeding.    ROS  Please see under HPI.    Allergies  Allergies   Allergen Reactions     Seasonal Allergies        Medications  Current Outpatient Medications   Medication Sig Dispense Refill     aspirin (ASA) 81 MG EC tablet Take 1 tablet (81 mg) by mouth daily 100 tablet 1     Blood Glucose Monitoring Suppl (ONETOUCH VERIO FLEX SYSTEM) w/Device KIT 1 each 4 times daily 1 kit 0     insulin NPH (NOVOLIN N VIAL) 100 UNIT/ML vial Inject 8 units subcutaneous at hs. 3 mL 3     insulin pen needle (BD CARINA U/F) 32G X 4 MM miscellaneous Use 1 daily as directed. 100 each 1     insulin syringe-needle U-100 (31G X 5/16\" 0.3 ML) 31G X 5/16\" 0.3 ML miscellaneous Use 1 syringes daily or as directed. 100 each 1     insulin syringe-needle U-100 (BD INSULIN SYRINGE ULTRAFINE) 31G X 5/16\" 0.3 ML miscellaneous Use daily. 100 each 3     Lancets (ONETOUCH DELICA PLUS [id number]) MISC USE AS DIRECTED ONCE DAILY 100 each prn     ONETOUCH VERIO IQ test strip Use to test blood sugar 4 times daily or as directed.  Ok to substitute alternative if insurance prefers. 200 strip 3     Phenylephrine HCl (AFRIN ALLERGY NA)        Prenatal Vit-Fe Fumarate-FA (PRENATAL VITAMINS PO)          Family History  family history includes Cerebrovascular Disease in her father; Hyperlipidemia in " her sister; Liver Cancer in her mother.    Social History   reports that she has never smoked. She has never used smokeless tobacco. She reports previous alcohol use. She reports that she does not use drugs.     Past Medical History  None.    Physical Exam    No exam today- telephone visit.    RESULTS  Creatinine   Date Value Ref Range Status   06/02/2020 0.47 (L) 0.52 - 1.04 mg/dL Final     GFR Estimate   Date Value Ref Range Status   06/02/2020 >90 >60 mL/min/[1.73_m2] Final     Comment:     Non  GFR Calc  Starting 12/18/2018, serum creatinine based estimated GFR (eGFR) will be   calculated using the Chronic Kidney Disease Epidemiology Collaboration   (CKD-EPI) equation.       Hemoglobin A1C   Date Value Ref Range Status   03/17/2020 5.9 (H) 0 - 5.6 % Final     Comment:     Normal <5.7% Prediabetes 5.7-6.4%  Diabetes 6.5% or higher - adopted from ADA   consensus guidelines.       ALT   Date Value Ref Range Status   06/02/2020 16 0 - 50 U/L Final     AST   Date Value Ref Range Status   06/02/2020 11 0 - 45 U/L Final       ASSESSMENT/PLAN:    1.  GESTATIONAL DIABETES: Continue NPH 8 units subcutaneous at bedtime.  Pt is to continue to check her fasting blood sugar each am and also check her blood sugar 1 hr postmeal.  She is aware that we would like to see her FBS 95 or < and her 1 hr postmeal blood sugar 140 or <.  Timothy is to continue to work with Christine Torres for her diabetes education and support and follow Christine's dietary recommendations.  Pt will probably need NPH BID and meal time insulin further into her pregnancy.    Timothy was seen by OB staff yesterday and her BP was good 110/60.   TSH and A1C  ordered.    2.  PREGNANCY: Pt is [id number] pregnant and followed by OB at [place].    3.  FOLLOW UP: with me on 6/10/2020.  Pt has my contact number to call if she has any questions or if her blood sugar values are above target.        Again, thank you for allowing me to participate in the care of  your patient.      Sincerely,    Alma Salamanca PA-C

## 2020-06-05 ENCOUNTER — TELEPHONE (OUTPATIENT)
Dept: ENDOCRINOLOGY | Facility: CLINIC | Age: 30
End: 2020-06-05

## 2020-06-05 LAB
# FETUSES US: NORMAL
# FETUSES: NORMAL
AFP ADJ MOM AMN: 1.28
AFP SERPL-MCNC: 55 NG/ML
AGE - REPORTED: 30.4 YR
CURRENT SMOKER: NO
CURRENT SMOKER: NO
DIABETES STATUS PATIENT: YES
FAMILY MEMBER DISEASES HX: NO
FAMILY MEMBER DISEASES HX: NO
GA METHOD: NORMAL
GA METHOD: NORMAL
GA: NORMAL WK
HCG MOM SERPL: 1.21
HCG SERPL-ACNC: NORMAL IU/L
HX OF HEREDITARY DISORDERS: NO
IDDM PATIENT QL: YES
INHIBIN A MOM SERPL: 1.08
INHIBIN A SERPL-MCNC: 174 PG/ML
INTEGRATED SCN PATIENT-IMP: NORMAL
IVF PREGNANCY: NO
LMP START DATE: NORMAL
MONOCHORIONIC TWINS: NO
PATHOLOGY STUDY: NORMAL
PREV FETUS DEFECT: NO
SERVICE CMNT-IMP: NO
SPECIMEN DRAWN SERPL: NORMAL
U ESTRIOL MOM SERPL: 1.2
U ESTRIOL SERPL-MCNC: 2.33 NG/ML
VALPROIC/CARBAMAZEPINE STATUS: NO
WEIGHT UNITS: NORMAL

## 2020-06-05 NOTE — TELEPHONE ENCOUNTER
----- Message from Alma Salamanca PA-C sent at 6/5/2020  3:54 PM CDT -----  I typed a letter for Hang.  Can you let her know the letter is done and provide her with a copy of the letter.  Thanks, teodoro

## 2020-06-09 NOTE — PROGRESS NOTES
"Timothy Crowell is a 30 year old female who is being evaluated via a billable telephone visit.      The patient has been notified of following:     \"This telephone visit will be conducted via a call between you and your physician/provider. We have found that certain health care needs can be provided without the need for a physical exam.  This service lets us provide the care you need with a short phone conversation.  If a prescription is necessary we can send it directly to your pharmacy.  If lab work is needed we can place an order for that and you can then stop by our lab to have the test done at a later time.    Telephone visits are billed at different rates depending on your insurance coverage. During this emergency period, for some insurers they may be billed the same as an in-person visit.  Please reach out to your insurance provider with any questions.    If during the course of the call the physician/provider feels a telephone visit is not appropriate, you will not be charged for this service.\"    Patient has given verbal consent for Telephone visit?  Yes    What phone number would you like to be contacted at? [phone]    How would you like to obtain your AVS? Elfego Lomas MA    Patients Glucose Data was Sent in [employer]     Due to the COVID 19 pandemic this visit was converted to a telephone visit in order to help prevent spread of infection in this patient and the general population.    Time of start: 9:00 am  Time of end: 9:11 am  Total duration of telephone visit: 11 minutes    JIMMY Crowell is a 30 year old female with gestational diabetes. Telephone visit today for diabetes follow up.   No previous hx of diabetes.  Pt was seen by Dr. Castro in our clinic on 4/8/2020.   This is Timothy's first pregnancy and she is currently [id number] pregnant.  Her SHELDON is 10/22/2020. Pt is being seen by OB at [place].  She has been followed by Christine Torres RD for diabetes education and " "support.  For her diabetes, she is currently taking NPH insulin 8 units subcutaneous at bedtime.  Her FBS values have been 86, 82, 78, 83, 91 and 84.  Hang's 1 hr postmeal blood sugar values have been < 140 ( 121, 122, 110, 140, 99, 105, 127, 129, 99 and 120 ).  No hypoglycemia.  On ROS today,doing well.  Not working at this time.  She denies SOB at rest, cough, fever, chills or chest pain.  No abd pain or diarrhea. No dysuria or hematuria.  Patient denies vaginal spotting or bleeding.    ROS  Please see under HPI.    Allergies  Allergies   Allergen Reactions     Seasonal Allergies        Medications  Current Outpatient Medications   Medication Sig Dispense Refill     aspirin (ASA) 81 MG EC tablet Take 1 tablet (81 mg) by mouth daily 100 tablet 1     Blood Glucose Monitoring Suppl (ONETOUCH VERIO FLEX SYSTEM) w/Device KIT 1 each 4 times daily 1 kit 0     insulin NPH (NOVOLIN N VIAL) 100 UNIT/ML vial Inject 8 units subcutaneous at hs. 3 mL 3     insulin pen needle (BD CARINA U/F) 32G X 4 MM miscellaneous Use 1 daily as directed. 100 each 1     insulin syringe-needle U-100 (31G X 5/16\" 0.3 ML) 31G X 5/16\" 0.3 ML miscellaneous Use 1 syringes daily or as directed. 100 each 1     insulin syringe-needle U-100 (BD INSULIN SYRINGE ULTRAFINE) 31G X 5/16\" 0.3 ML miscellaneous Use daily. 100 each 3     Lancets (ONETOUCH DELICA PLUS [id number]) MISC USE AS DIRECTED ONCE DAILY 100 each prn     ONETOUCH VERIO IQ test strip Use to test blood sugar 4 times daily or as directed.  Ok to substitute alternative if insurance prefers. 200 strip 3     Phenylephrine HCl (AFRIN ALLERGY NA)        Prenatal Vit-Fe Fumarate-FA (PRENATAL VITAMINS PO)          Family History  family history includes Cerebrovascular Disease in her father; Hyperlipidemia in her sister; Liver Cancer in her mother.    Social History   reports that she has never smoked. She has never used smokeless tobacco. She reports previous alcohol use. She reports that she does not " use drugs.     Past Medical History  None.    Physical Exam    No exam today.    RESULTS  Creatinine   Date Value Ref Range Status   06/02/2020 0.47 (L) 0.52 - 1.04 mg/dL Final     GFR Estimate   Date Value Ref Range Status   06/02/2020 >90 >60 mL/min/[1.73_m2] Final     Comment:     Non  GFR Calc  Starting 12/18/2018, serum creatinine based estimated GFR (eGFR) will be   calculated using the Chronic Kidney Disease Epidemiology Collaboration   (CKD-EPI) equation.       Hemoglobin A1C   Date Value Ref Range Status   03/17/2020 5.9 (H) 0 - 5.6 % Final     Comment:     Normal <5.7% Prediabetes 5.7-6.4%  Diabetes 6.5% or higher - adopted from ADA   consensus guidelines.       ALT   Date Value Ref Range Status   06/02/2020 16 0 - 50 U/L Final     AST   Date Value Ref Range Status   06/02/2020 11 0 - 45 U/L Final       ASSESSMENT/PLAN:    1.  GESTATIONAL DIABETES: Continue NPH 8 units subcutaneous at bedtime.  Pt is to continue to check her fasting blood sugar each am and also check her blood sugar 1 hr postmeal.  She is aware that we would like to see her FBS 95 or < and her 1 hr postmeal blood sugar 140 or <.  Hang is to continue to work with Christine Torres for her diabetes education and support and follow Christine's dietary recommendations.  Pt may need NPH BID and meal time insulin further into her pregnancy.    Her BP at home has been good.  TSH and A1C  ordered.    2.  PREGNANCY: Pt is [id number] pregnant and followed by OB at [place].    3.  FOLLOW UP: with me on 6/24 at 2 pm and 7/15/2020 at 2 pm.  Pt has my contact number to call if she has any questions or if her blood sugar values are above target.  I also asked her to use [employer] if she has any questions.

## 2020-06-10 ENCOUNTER — VIRTUAL VISIT (OUTPATIENT)
Dept: ENDOCRINOLOGY | Facility: CLINIC | Age: 30
End: 2020-06-10
Payer: COMMERCIAL

## 2020-06-10 DIAGNOSIS — O24.414 INSULIN CONTROLLED GESTATIONAL DIABETES MELLITUS (GDM) IN SECOND TRIMESTER: Primary | ICD-10-CM

## 2020-06-10 NOTE — LETTER
"6/10/2020       RE: Timothy Crowell  [address]  [facility] [address]     Dear Colleague,    Thank you for referring your patient, Timothy Crowell, to the [facility] ENDOCRINOLOGY at [facility]. Please see a copy of my visit note below.    Timothy Crowell is a 30 year old female who is being evaluated via a billable telephone visit.      The patient has been notified of following:     \"This telephone visit will be conducted via a call between you and your physician/provider. We have found that certain health care needs can be provided without the need for a physical exam.  This service lets us provide the care you need with a short phone conversation.  If a prescription is necessary we can send it directly to your pharmacy.  If lab work is needed we can place an order for that and you can then stop by our lab to have the test done at a later time.    Telephone visits are billed at different rates depending on your insurance coverage. During this emergency period, for some insurers they may be billed the same as an in-person visit.  Please reach out to your insurance provider with any questions.    If during the course of the call the physician/provider feels a telephone visit is not appropriate, you will not be charged for this service.\"    Patient has given verbal consent for Telephone visit?  Yes    What phone number would you like to be contacted at? [phone]    How would you like to obtain your AVS? Elfego Lomas MA    Patients Glucose Data was Sent in [employer]     Due to the COVID 19 pandemic this visit was converted to a telephone visit in order to help prevent spread of infection in this patient and the general population.    Time of start: 9:00 am  Time of end: 9:11 am  Total duration of telephone visit: 11 minutes    HPI  Timothy Crowell is a 30 year old female with gestational diabetes. Telephone visit today for diabetes " "follow up.   No previous hx of diabetes.  Pt was seen by Dr. Castro in our clinic on 4/8/2020.   This is Timothy's first pregnancy and she is currently [id number] pregnant.  Her SHELDON is 10/22/2020. Pt is being seen by OB at [place].  She has been followed by Christine Torres RD for diabetes education and support.  For her diabetes, she is currently taking NPH insulin 8 units subcutaneous at bedtime.  Her FBS values have been 86, 82, 78, 83, 91 and 84.  Timothy's 1 hr postmeal blood sugar values have been < 140 ( 121, 122, 110, 140, 99, 105, 127, 129, 99 and 120 ).  No hypoglycemia.  On ROS today,doing well.  Not working at this time.  She denies SOB at rest, cough, fever, chills or chest pain.  No abd pain or diarrhea. No dysuria or hematuria.  Patient denies vaginal spotting or bleeding.    ROS  Please see under HPI.    Allergies  Allergies   Allergen Reactions     Seasonal Allergies        Medications  Current Outpatient Medications   Medication Sig Dispense Refill     aspirin (ASA) 81 MG EC tablet Take 1 tablet (81 mg) by mouth daily 100 tablet 1     Blood Glucose Monitoring Suppl (ONETOUCH VERIO FLEX SYSTEM) w/Device KIT 1 each 4 times daily 1 kit 0     insulin NPH (NOVOLIN N VIAL) 100 UNIT/ML vial Inject 8 units subcutaneous at hs. 3 mL 3     insulin pen needle (BD CARINA U/F) 32G X 4 MM miscellaneous Use 1 daily as directed. 100 each 1     insulin syringe-needle U-100 (31G X 5/16\" 0.3 ML) 31G X 5/16\" 0.3 ML miscellaneous Use 1 syringes daily or as directed. 100 each 1     insulin syringe-needle U-100 (BD INSULIN SYRINGE ULTRAFINE) 31G X 5/16\" 0.3 ML miscellaneous Use daily. 100 each 3     Lancets (ONETOUCH DELICA PLUS [id number]) MISC USE AS DIRECTED ONCE DAILY 100 each prn     ONETOUCH VERIO IQ test strip Use to test blood sugar 4 times daily or as directed.  Ok to substitute alternative if insurance prefers. 200 strip 3     Phenylephrine HCl (AFRIN ALLERGY NA)        Prenatal Vit-Fe Fumarate-FA (PRENATAL VITAMINS PO)    "       Family History  family history includes Cerebrovascular Disease in her father; Hyperlipidemia in her sister; Liver Cancer in her mother.    Social History   reports that she has never smoked. She has never used smokeless tobacco. She reports previous alcohol use. She reports that she does not use drugs.     Past Medical History  None.    Physical Exam    No exam today.    RESULTS  Creatinine   Date Value Ref Range Status   06/02/2020 0.47 (L) 0.52 - 1.04 mg/dL Final     GFR Estimate   Date Value Ref Range Status   06/02/2020 >90 >60 mL/min/[1.73_m2] Final     Comment:     Non  GFR Calc  Starting 12/18/2018, serum creatinine based estimated GFR (eGFR) will be   calculated using the Chronic Kidney Disease Epidemiology Collaboration   (CKD-EPI) equation.       Hemoglobin A1C   Date Value Ref Range Status   03/17/2020 5.9 (H) 0 - 5.6 % Final     Comment:     Normal <5.7% Prediabetes 5.7-6.4%  Diabetes 6.5% or higher - adopted from ADA   consensus guidelines.       ALT   Date Value Ref Range Status   06/02/2020 16 0 - 50 U/L Final     AST   Date Value Ref Range Status   06/02/2020 11 0 - 45 U/L Final       ASSESSMENT/PLAN:    1.  GESTATIONAL DIABETES: Continue NPH 8 units subcutaneous at bedtime.  Pt is to continue to check her fasting blood sugar each am and also check her blood sugar 1 hr postmeal.  She is aware that we would like to see her FBS 95 or < and her 1 hr postmeal blood sugar 140 or <.  Hang is to continue to work with Christine Torers for her diabetes education and support and follow Christine's dietary recommendations.  Pt may need NPH BID and meal time insulin further into her pregnancy.    Her BP at home has been good.  TSH and A1C  ordered.    2.  PREGNANCY: Pt is [id number] pregnant and followed by OB at [place].    3.  FOLLOW UP: with me on 6/24 at 2 pm and 7/15/2020 at 2 pm.  Pt has my contact number to call if she has any questions or if her blood sugar values are above target.  I  also asked her to use [email or web address] if she has any questions.      Again, thank you for allowing me to participate in the care of your patient.      Sincerely,    Alma Salamanca PA-C

## 2020-06-22 ENCOUNTER — PRE VISIT (OUTPATIENT)
Dept: MATERNAL FETAL MEDICINE | Facility: CLINIC | Age: 30
End: 2020-06-22

## 2020-06-22 DIAGNOSIS — E11.9 TYPE 2 DIABETES MELLITUS WITHOUT COMPLICATION, WITHOUT LONG-TERM CURRENT USE OF INSULIN (H): ICD-10-CM

## 2020-06-22 RX ORDER — LANCETS 33 GAUGE
100 EACH MISCELLANEOUS 4 TIMES DAILY
Qty: 100 EACH | Status: SHIPPED | OUTPATIENT
Start: 2020-06-22 | End: 2022-05-19

## 2020-06-23 NOTE — PROGRESS NOTES
"Timothy Crowell is a 30 year old female who is being evaluated via a billable telephone visit.      The patient has been notified of following:     \"This telephone visit will be conducted via a call between you and your physician/provider. We have found that certain health care needs can be provided without the need for a physical exam.  This service lets us provide the care you need with a short phone conversation.  If a prescription is necessary we can send it directly to your pharmacy.  If lab work is needed we can place an order for that and you can then stop by our lab to have the test done at a later time.    Telephone visits are billed at different rates depending on your insurance coverage. During this emergency period, for some insurers they may be billed the same as an in-person visit.  Please reach out to your insurance provider with any questions.    If during the course of the call the physician/provider feels a telephone visit is not appropriate, you will not be charged for this service.\"    Patient has given verbal consent for Telephone visit?  Yes    What phone number would you like to be contacted at? [phone]    How would you like to obtain your AVS? Elfego Lomas MA    Patients Glucose Data was Sent in [employer]     Due to the COVID 19 pandemic this visit was converted to a telephone visit in order to help prevent spread of infection in this patient and the general population.    Time of start: 2:00 pm  Time of end: 2:09 pm  Total duration of telephone visit: 9 minutes.    HPI  Timothy Crowell is a 30 year old female with gestational diabetes. Telephone visit today for diabetes follow up.   No previous hx of diabetes.  Pt was seen by Dr. Castro in our clinic on 4/8/2020.   This is Timothy's first pregnancy and she is currently [id number] pregnant.  Her SHELDON is 10/22/2020. Pt is being seen by OB at [place].  She has been followed by Christine Torres RD for diabetes education and " "support.  For her diabetes, she is currently taking NPH insulin 8 units subcutaneous at bedtime.  Her FBS values have been 84, 81, 87, 89, 89, 82 and 77.  Hang's 1 hr postmeal blood sugar values have been < 140 ( 110, 110, 106, 110, 133, 130, 109, 115, 111, 105, 105, 111, 138, 98, 136, 115, 138, 105 and 126 ).   No hypoglycemia.  On ROS today,doing well.    She denies SOB at rest, cough, fever, chills or chest pain.  No abd pain or diarrhea. No dysuria or hematuria.  Patient denies vaginal spotting or bleeding.    ROS  Please see under HPI.    Allergies  Allergies   Allergen Reactions     Seasonal Allergies        Medications  Current Outpatient Medications   Medication Sig Dispense Refill     aspirin (ASA) 81 MG EC tablet Take 1 tablet (81 mg) by mouth daily 100 tablet 1     Blood Glucose Monitoring Suppl (ONETOUCH VERIO FLEX SYSTEM) w/Device KIT 1 each 4 times daily 1 kit 0     insulin NPH (NOVOLIN N VIAL) 100 UNIT/ML vial Inject 8 units subcutaneous at hs. 3 mL 3     insulin pen needle (BD CARINA U/F) 32G X 4 MM miscellaneous Use 1 daily as directed. 100 each 1     insulin syringe-needle U-100 (31G X 5/16\" 0.3 ML) 31G X 5/16\" 0.3 ML miscellaneous Use 1 syringes daily or as directed. 100 each 1     insulin syringe-needle U-100 (BD INSULIN SYRINGE ULTRAFINE) 31G X 5/16\" 0.3 ML miscellaneous Use daily. 100 each 3     Lancets (ONETOUCH DELICA PLUS [id number]) MISC 100 each by In Vitro route 4 times daily 100 each prn     ONETOUCH VERIO IQ test strip Use to test blood sugar 4 times daily or as directed.  Ok to substitute alternative if insurance prefers. 200 strip 3     Phenylephrine HCl (AFRIN ALLERGY NA)        Prenatal Vit-Fe Fumarate-FA (PRENATAL VITAMINS PO)          Family History  family history includes Cerebrovascular Disease in her father; Hyperlipidemia in her sister; Liver Cancer in her mother.    Social History   reports that she has never smoked. She has never used smokeless tobacco. She reports previous " alcohol use. She reports that she does not use drugs.     Past Medical History  None.    Physical Exam    No exam today.    RESULTS  Creatinine   Date Value Ref Range Status   06/02/2020 0.47 (L) 0.52 - 1.04 mg/dL Final     GFR Estimate   Date Value Ref Range Status   06/02/2020 >90 >60 mL/min/[1.73_m2] Final     Comment:     Non  GFR Calc  Starting 12/18/2018, serum creatinine based estimated GFR (eGFR) will be   calculated using the Chronic Kidney Disease Epidemiology Collaboration   (CKD-EPI) equation.       Hemoglobin A1C   Date Value Ref Range Status   03/17/2020 5.9 (H) 0 - 5.6 % Final     Comment:     Normal <5.7% Prediabetes 5.7-6.4%  Diabetes 6.5% or higher - adopted from ADA   consensus guidelines.       ALT   Date Value Ref Range Status   06/02/2020 16 0 - 50 U/L Final     AST   Date Value Ref Range Status   06/02/2020 11 0 - 45 U/L Final       ASSESSMENT/PLAN:    1.  GESTATIONAL DIABETES: Continue NPH 8 units subcutaneous at bedtime.  Pt is to continue to check her fasting blood sugar each am and also check her blood sugar 1 hr postmeal.  She is aware that we would like to see her FBS 95 or < and her 1 hr postmeal blood sugar 140 or <.  Pt may need NPH BID and meal time insulin further into her pregnancy.    Her BP at home has been good.  TSH and A1C ordered.    2.  PREGNANCY: Pt is [id number] pregnant and followed by OB at [place].    3.  FOLLOW UP: with me on 7/24/2020 at 1 pm.   Pt has my contact number to call if she has any questions or if her blood sugar values are above target.  I also asked her to use [employer] if she has any questions.  Reminded her to have an A1C and TSH done.

## 2020-06-24 ENCOUNTER — HOSPITAL ENCOUNTER (OUTPATIENT)
Dept: CARDIOLOGY | Facility: CLINIC | Age: 30
End: 2020-06-24
Attending: OBSTETRICS & GYNECOLOGY
Payer: COMMERCIAL

## 2020-06-24 ENCOUNTER — VIRTUAL VISIT (OUTPATIENT)
Dept: ENDOCRINOLOGY | Facility: CLINIC | Age: 30
End: 2020-06-24
Payer: COMMERCIAL

## 2020-06-24 ENCOUNTER — OFFICE VISIT (OUTPATIENT)
Dept: MATERNAL FETAL MEDICINE | Facility: CLINIC | Age: 30
End: 2020-06-24
Attending: OBSTETRICS & GYNECOLOGY
Payer: COMMERCIAL

## 2020-06-24 ENCOUNTER — HOSPITAL ENCOUNTER (OUTPATIENT)
Dept: ULTRASOUND IMAGING | Facility: CLINIC | Age: 30
End: 2020-06-24
Attending: OBSTETRICS & GYNECOLOGY
Payer: COMMERCIAL

## 2020-06-24 DIAGNOSIS — O24.119 TYPE 2 DIABETES MELLITUS IN PREGNANCY, UNSPECIFIED TRIMESTER: Primary | ICD-10-CM

## 2020-06-24 DIAGNOSIS — O28.3 NUCHAL FOLD THICKENING ON PRENATAL ULTRASOUND: ICD-10-CM

## 2020-06-24 DIAGNOSIS — O26.90 PREGNANCY RELATED CONDITION: ICD-10-CM

## 2020-06-24 DIAGNOSIS — O24.414 INSULIN CONTROLLED GESTATIONAL DIABETES MELLITUS (GDM) IN SECOND TRIMESTER: Primary | ICD-10-CM

## 2020-06-24 PROCEDURE — 76811 OB US DETAILED SNGL FETUS: CPT

## 2020-06-24 PROCEDURE — 76827 ECHO EXAM OF FETAL HEART: CPT

## 2020-06-24 NOTE — LETTER
"6/24/2020       RE: Timothy Crowell  [address]  [facility] [address]     Dear Colleague,    Thank you for referring your patient, Timothy Crowell, to the [facility] ENDOCRINOLOGY at [facility]. Please see a copy of my visit note below.    Timothy Crowell is a 30 year old female who is being evaluated via a billable telephone visit.      The patient has been notified of following:     \"This telephone visit will be conducted via a call between you and your physician/provider. We have found that certain health care needs can be provided without the need for a physical exam.  This service lets us provide the care you need with a short phone conversation.  If a prescription is necessary we can send it directly to your pharmacy.  If lab work is needed we can place an order for that and you can then stop by our lab to have the test done at a later time.    Telephone visits are billed at different rates depending on your insurance coverage. During this emergency period, for some insurers they may be billed the same as an in-person visit.  Please reach out to your insurance provider with any questions.    If during the course of the call the physician/provider feels a telephone visit is not appropriate, you will not be charged for this service.\"    Patient has given verbal consent for Telephone visit?  Yes    What phone number would you like to be contacted at? [phone]    How would you like to obtain your AVS? Elfego Lomas MA    Patients Glucose Data was Sent in [employer]     Due to the COVID 19 pandemic this visit was converted to a telephone visit in order to help prevent spread of infection in this patient and the general population.    Time of start: 2:00 pm  Time of end: 2:09 pm  Total duration of telephone visit: 9 minutes.    HPI  Timothy Crowell is a 30 year old female with gestational diabetes. Telephone visit today for diabetes " "follow up.   No previous hx of diabetes.  Pt was seen by Dr. Castro in our clinic on 4/8/2020.   This is Timothy's first pregnancy and she is currently [id number] pregnant.  Her SHELDON is 10/22/2020. Pt is being seen by OB at [place].  She has been followed by Christine Torres RD for diabetes education and support.  For her diabetes, she is currently taking NPH insulin 8 units subcutaneous at bedtime.  Her FBS values have been 84, 81, 87, 89, 89, 82 and 77.  Timothy's 1 hr postmeal blood sugar values have been < 140 ( 110, 110, 106, 110, 133, 130, 109, 115, 111, 105, 105, 111, 138, 98, 136, 115, 138, 105 and 126 ).   No hypoglycemia.  On ROS today,doing well.    She denies SOB at rest, cough, fever, chills or chest pain.  No abd pain or diarrhea. No dysuria or hematuria.  Patient denies vaginal spotting or bleeding.    ROS  Please see under HPI.    Allergies  Allergies   Allergen Reactions     Seasonal Allergies        Medications  Current Outpatient Medications   Medication Sig Dispense Refill     aspirin (ASA) 81 MG EC tablet Take 1 tablet (81 mg) by mouth daily 100 tablet 1     Blood Glucose Monitoring Suppl (ONETOUCH VERIO FLEX SYSTEM) w/Device KIT 1 each 4 times daily 1 kit 0     insulin NPH (NOVOLIN N VIAL) 100 UNIT/ML vial Inject 8 units subcutaneous at hs. 3 mL 3     insulin pen needle (BD CARINA U/F) 32G X 4 MM miscellaneous Use 1 daily as directed. 100 each 1     insulin syringe-needle U-100 (31G X 5/16\" 0.3 ML) 31G X 5/16\" 0.3 ML miscellaneous Use 1 syringes daily or as directed. 100 each 1     insulin syringe-needle U-100 (BD INSULIN SYRINGE ULTRAFINE) 31G X 5/16\" 0.3 ML miscellaneous Use daily. 100 each 3     Lancets (ONETOUCH DELICA PLUS [id number]) MISC 100 each by In Vitro route 4 times daily 100 each prn     ONETOUCH VERIO IQ test strip Use to test blood sugar 4 times daily or as directed.  Ok to substitute alternative if insurance prefers. 200 strip 3     Phenylephrine HCl (AFRIN ALLERGY NA)        Prenatal " Vit-Fe Fumarate-FA (PRENATAL VITAMINS PO)          Family History  family history includes Cerebrovascular Disease in her father; Hyperlipidemia in her sister; Liver Cancer in her mother.    Social History   reports that she has never smoked. She has never used smokeless tobacco. She reports previous alcohol use. She reports that she does not use drugs.     Past Medical History  None.    Physical Exam    No exam today.    RESULTS  Creatinine   Date Value Ref Range Status   06/02/2020 0.47 (L) 0.52 - 1.04 mg/dL Final     GFR Estimate   Date Value Ref Range Status   06/02/2020 >90 >60 mL/min/[1.73_m2] Final     Comment:     Non  GFR Calc  Starting 12/18/2018, serum creatinine based estimated GFR (eGFR) will be   calculated using the Chronic Kidney Disease Epidemiology Collaboration   (CKD-EPI) equation.       Hemoglobin A1C   Date Value Ref Range Status   03/17/2020 5.9 (H) 0 - 5.6 % Final     Comment:     Normal <5.7% Prediabetes 5.7-6.4%  Diabetes 6.5% or higher - adopted from ADA   consensus guidelines.       ALT   Date Value Ref Range Status   06/02/2020 16 0 - 50 U/L Final     AST   Date Value Ref Range Status   06/02/2020 11 0 - 45 U/L Final       ASSESSMENT/PLAN:    1.  GESTATIONAL DIABETES: Continue NPH 8 units subcutaneous at bedtime.  Pt is to continue to check her fasting blood sugar each am and also check her blood sugar 1 hr postmeal.  She is aware that we would like to see her FBS 95 or < and her 1 hr postmeal blood sugar 140 or <.  Pt may need NPH BID and meal time insulin further into her pregnancy.    Her BP at home has been good.  TSH and A1C ordered.    2.  PREGNANCY: Pt is [id number] pregnant and followed by OB at [place].    3.  FOLLOW UP: with me on 7/24/2020 at 1 pm.   Pt has my contact number to call if she has any questions or if her blood sugar values are above target.  I also asked her to use [employer] if she has any questions.  Reminded her to have an A1C and TSH  done.      Again, thank you for allowing me to participate in the care of your patient.      Sincerely,    Alma Salamanca PA-C

## 2020-06-25 DIAGNOSIS — O24.414 INSULIN CONTROLLED GESTATIONAL DIABETES MELLITUS (GDM) IN SECOND TRIMESTER: ICD-10-CM

## 2020-06-25 LAB
HBA1C MFR BLD: 5.2 % (ref 0–5.6)
TSH SERPL DL<=0.005 MIU/L-ACNC: 1.51 MU/L (ref 0.4–4)

## 2020-06-25 PROCEDURE — 84443 ASSAY THYROID STIM HORMONE: CPT | Performed by: PHYSICIAN ASSISTANT

## 2020-06-25 PROCEDURE — 36415 COLL VENOUS BLD VENIPUNCTURE: CPT | Performed by: PHYSICIAN ASSISTANT

## 2020-06-25 PROCEDURE — 83036 HEMOGLOBIN GLYCOSYLATED A1C: CPT | Performed by: PHYSICIAN ASSISTANT

## 2020-06-26 ENCOUNTER — TELEPHONE (OUTPATIENT)
Dept: ENDOCRINOLOGY | Facility: CLINIC | Age: 30
End: 2020-06-26

## 2020-06-26 NOTE — TELEPHONE ENCOUNTER
6/26/2020  Left message for pt that her A1C result is good at 5.2 % and that her TSH is normal.  Alma Salamanca PA-C

## 2020-06-29 ENCOUNTER — TELEPHONE (OUTPATIENT)
Dept: MATERNAL FETAL MEDICINE | Facility: CLINIC | Age: 30
End: 2020-06-29

## 2020-06-29 DIAGNOSIS — O28.3 ABNORMAL FETAL ULTRASOUND: Primary | ICD-10-CM

## 2020-07-01 ENCOUNTER — PRENATAL OFFICE VISIT (OUTPATIENT)
Dept: OBGYN | Facility: CLINIC | Age: 30
End: 2020-07-01
Payer: COMMERCIAL

## 2020-07-01 ENCOUNTER — OFFICE VISIT (OUTPATIENT)
Dept: MATERNAL FETAL MEDICINE | Facility: CLINIC | Age: 30
End: 2020-07-01
Attending: OBSTETRICS & GYNECOLOGY
Payer: COMMERCIAL

## 2020-07-01 DIAGNOSIS — Z34.02 SUPERVISION OF NORMAL FIRST PREGNANCY IN SECOND TRIMESTER: Primary | ICD-10-CM

## 2020-07-01 DIAGNOSIS — O28.3 ABNORMAL FETAL ULTRASOUND: ICD-10-CM

## 2020-07-01 DIAGNOSIS — E11.9 TYPE 2 DIABETES MELLITUS WITHOUT COMPLICATION, WITHOUT LONG-TERM CURRENT USE OF INSULIN (H): ICD-10-CM

## 2020-07-01 PROCEDURE — 99207 ZZC PRENATAL VISIT: CPT | Performed by: OBSTETRICS & GYNECOLOGY

## 2020-07-01 PROCEDURE — 96040 ZZH GENETIC COUNSELING, EACH 30 MINUTES: CPT | Mod: ZF | Performed by: GENETIC COUNSELOR, MS

## 2020-07-01 PROCEDURE — 36415 COLL VENOUS BLD VENIPUNCTURE: CPT | Performed by: OBSTETRICS & GYNECOLOGY

## 2020-07-01 PROCEDURE — 40000791 ZZHCL STATISTIC VERIFI PRENATAL TRISOMY 21,18,13: Performed by: OBSTETRICS & GYNECOLOGY

## 2020-07-01 NOTE — PROGRESS NOTES
[facility]  Genetic Counseling Consult    Patient: Timothy Crowell YOB: 1990   Date of Service:  20      Timothy Crowell was seen at the [facility] for genetic consultation given abnormal ultrasound findings. Timothy's , Christiano, was present via phone for the appointment.      Impression/Plan:   Timothy had a genetic counseling session only and a blood draw for NIPT (Innatal test through [facility]).  Results are expected within 7-10 days, and will be available in [facility]. We will contact her to discuss the results, and a copy will be forwarded to the office of the referring OB provider. Timothy requested that I do NOT leave a message with her results if she does not answer the phone. She would also like a copy of the results mailed to her home.         Timothy was seen at the [facility] on 20 for her comprehensive (level II) ultrasound at 22w2d gestation (based on ultrasound). This ultrasound identified a thickened nuchal fold measurement of 6.4mm. Otherwise, none of the anomalies commonly detected by ultrasound were evident in this scan. Prior to this scan at 19w1d gestation, Timothy had a second trimester maternal quad screen that was normal/negative. During today's genetic counseling appointment, we reviewed Timohty's prior quad screen results, as well as the increased risk of aneuploidy (specifically Down Syndrome) in fetuses with a thickened nuchal fold. Furthermore, we discussed additional screening and diagnostic testing options (NIPT vs Amniocentesis) moving forward to gather a more accurate risk assessment for Timothy's pregnancy to have Down Syndrome. Ultimately, Timothy elected to proceed with Non-Invasive Prenatal Screening (NIPT) today. Timothy stated that a diagnosis of Down Syndrome would not change Timothy's decision to continue the pregnancy.    Pregnancy History:   /Parity:                             Age at Delivery: 30 year old  SHELDON: 10/26/2020, by Ultrasound                  Gestational Age: 23w2d  -  No significant complications or exposures were reported in the current pregnancy.  -  This is Timothy's first pregnancy.    Medical History:   Timothy s reported medical history is not expected to impact pregnancy management or risks to fetal development.       Family History:   A three-generation pedigree was obtained, and is scanned under the  Media  tab.   The following significant findings were reported by Timothy:    Timothy's partner, Christiano, has a niece who has been diagnosed with Rett Syndrome. We reviewed the following information:  o Rett syndrome is a neurological genetic disorder caused by mutations in the MECP2 gene the typically affects female and rarely males. Clinical presentation includes developmental regression of language and social skills, difficulty with muscle coordination, breathing difficulty, microcephaly, seizures, sleep disturbances, and stereotypical behaviors such as hand wringing or clapping. Rett syndrome, although a genetic condition, is not an inherited condition. This family history is not anticipated to increase risks for other family members.    Otherwise, the reported family history is negative for multiple miscarriages, stillbirths, birth defects, cognitive impairment, known genetic conditions, and consanguinity.       Carrier Screening:   The patient reports that the father of the pregnancy has  ancestry:      Cystic fibrosis is an autosomal recessive genetic condition that occurs with increased frequency in individuals of  ancestry and carrier screening for this condition is available.  In addition,  screening in the [facility] includes cystic fibrosis.      The patient reports that she is of  ancestry:      The hemoglobinopathies are a group of genetic blood diseases that occur with increased frequency in individuals of   ancestry and carrier screening for these conditions is available.  Carrier screening for the hemoglobinopathies includes a CBC with red blood cell indices, a ferritin level, and a quantitative hemoglobin electrophoresis or HPLC.  In addition,  screening in the [facility] includes many of the hemoglobinopathies.      Expanded carrier screening for mutations in a large panel of genes associated with autosomal recessive conditions including cystic fibrosis, spinal muscular atrophy, and others, is now available.      Carrier screening was beyond the scope of our discussion today.        Risk Assessment:   We explained that the risk for fetal chromosome abnormalities increases with maternal age. We discussed specific features of common chromosome abnormalities, including Down syndrome, trisomy 13, trisomy 18, and sex chromosome trisomies.      - At age 30 at delivery, the risk to have a baby with Down syndrome is 1 in 952.    - At age 30 at delivery, the risk to have a baby with any chromosome abnormality is 1 in 384.     Timothy had a fetal anatomy scan on 20.  The ultrasound showed a thickened nuchal fold (6.4mm), which we discussed increases the risk of Trisomy 21 (Down Syndrome) in the patients pregnancy. I reviewed with Timothy her ultrasound finding of an increased nuchal fold.    We talked about that this finding is generally not associated with any medical problems involving neck and would not be expected to need medical treatment itself. However, some studies have suggested that this ultrasound finding is thought to be seen with a higher frequency in pregnancies with a chromosome problem (particularly Down syndrome), as compared to pregnancies that do not have a chromosome problem. Because of this, it is thought that this ultrasound finding probably somewhat increases the risk of a chromosome problem in a pregnancy.    We reviewed that based on this ultrasound finding of an increased nuchal fold  and the risk of Down syndrome estimated by her quad screen, the estimated overall risk of Down syndrome in this pregnancy is about 1 in 1,277. We talked about that most babies with this ultrasound finding do not have a chromosome problem.    Timothy had maternal serum screening earlier in pregnancy.    Quad screen    Maternal plasma AFP, hCG, estriol, and inhibin measurement between 15-24 weeks gestation    Provides risk assessment for fetal Down syndrome, trisomy 18, and neural tube defects    Timothy had a quad screen earlier in pregnancy; we reviewed the results today, which were Screen Negative    Chemical values were as follows    AFP 1.28 MoM, hCG 1.21 MoM, estriol 1.20 MoM, and DEREK 1.08 MoM    This screen gave the following risk assessments:    Down syndrome risk= 1:13,500    Trisomy 18 risk= 1:10,800    Open neural tube defects risk= 1:1,390      Testing Options:   We discussed the following options:   Non-invasive Prenatal Testing (NIPT)    Maternal plasma cell-free DNA testing; first trimester ultrasound with nuchal translucency and nasal bone assessment is recommended, when appropriate    Screens for fetal trisomy 21, trisomy 13, trisomy 18, and sex chromosome aneuploidy    Cannot screen for open neural tube defects; maternal serum AFP after 15 weeks is recommended       Quad screen:     Maternal plasma AFP, hCG, estriol, and inhibin measurement between 15-24 weeks gestation    Provides risk assessment for fetal Down syndrome, trisomy 18, and neural tube defects     Genetic Amniocentesis    Invasive procedure typically performed in the second trimester by which amniotic fluid is obtained for the purpose of chromosome analysis and/or other prenatal genetic analysis    Diagnostic results; >99% sensitivity for fetal chromosome abnormalities    AFAFP measurement tests for open neural tube defects       Comprehensive (Level II) ultrasound: Detailed ultrasound performed between 18-22 weeks gestation to screen for  major birth defects and markers for aneuploidy.      We reviewed the benefits and limitations of this testing.  Screening tests provide a risk assessment specific to the pregnancy for certain fetal chromosome abnormalities, but cannot definitively diagnose or exclude a fetal chromosome abnormality.  Follow-up genetic counseling and consideration of diagnostic testing is recommended with any abnormal screening result.     Diagnostic tests carry inherent risks- including risk of miscarriage- that require careful consideration.  These tests can detect fetal chromosome abnormalities with greater than 99% certainty.  Results can be compromised by maternal cell contamination or mosaicism, and are limited by the resolution of cytogenetic G-banding technology.  There is no screening nor diagnostic test that can detect all forms of birth defects or mental disability.    It was a pleasure to be involved with Hang s care. Face-to-face time of the meeting was 55 minutes.      Emmy Huang MS, [facility]  Genetic Counselor  Maternal Fetal Medicine  [facility]   Phone: [phone]  Pager: [phone]  Email: [email or web address]

## 2020-07-01 NOTE — Clinical Note
Can someone make her next appt with us on 7/22 with MFM (either before or after, prefers [facility]) thanks tu

## 2020-07-01 NOTE — PROGRESS NOTES
Phone visit for modified prenatal care for COVID. 7 min  Lots of FM and very happy about that. ECHO normal and has growth 7/22. Last u/s growth 39% and thickened NT, had NIPT done and waiting for results. Thinks her daughter will be fine.  Fasting 90 or less, 1 hour 90's. Will plan for appt with MFM to limit driving back here. BE

## 2020-07-10 ENCOUNTER — TELEPHONE (OUTPATIENT)
Dept: MATERNAL FETAL MEDICINE | Facility: CLINIC | Age: 30
End: 2020-07-10

## 2020-07-10 LAB — LAB SCANNED RESULT: NORMAL

## 2020-07-10 NOTE — TELEPHONE ENCOUNTER
7/10/2020       Called Timothy to discuss NIPT results.  Results came back negative for chromosome abnormalities in chromosomes 21, 18, & 13, as well as the sex chromosomes.  These test results do not definitively rule out the possibility of one of these conditions, but they do greatly reduce the likelihood.  The test identified sex chromosomes consistent with female sex (XX) which is consistent with ultrasound.  Timothy had no questions at this time and was encouraged to reach out if she has any questions or concerns in the future.       Cresencio Hagan MS, [facility]  Licensed Genetic Counselor  Phone: [phone]  Pager: [phone]

## 2020-07-14 NOTE — PROGRESS NOTES
"Timothy Crowell is a 30 year old female who is being evaluated via a billable telephone visit.      The patient has been notified of following:     \"This telephone visit will be conducted via a call between you and your physician/provider. We have found that certain health care needs can be provided without the need for a physical exam.  This service lets us provide the care you need with a short phone conversation.  If a prescription is necessary we can send it directly to your pharmacy.  If lab work is needed we can place an order for that and you can then stop by our lab to have the test done at a later time.    Telephone visits are billed at different rates depending on your insurance coverage. During this emergency period, for some insurers they may be billed the same as an in-person visit.  Please reach out to your insurance provider with any questions.    If during the course of the call the physician/provider feels a telephone visit is not appropriate, you will not be charged for this service.\"    Patient has given verbal consent for Telephone visit?  Yes    What phone number would you like to be contacted at? [phone]    How would you like to obtain your AVS? Elfego Lomas MA    Patients Glucose Data was Sent in [employer] Message     Due to the COVID 19 pandemic this visit was converted to a telephone visit in order to help prevent spread of infection in this patient and the general population.    Time of start: 2:03 pm  Time of end: 2:14 pm  Total duration of telephone visit: 11 minutes.    HPI  Timothy Crowell is a 30 year old female with gestational diabetes. Telephone visit today for diabetes follow up.   No previous hx of diabetes.  Pt was seen by Dr. Castro in our clinic on 4/8/2020.   This is Timothy's first pregnancy and she is currently [id number] pregnant.  Her SHELDON is 10/22/2020. Pt is being seen by OB at [place].  She has been followed by Christine Torres RD for diabetes education " "and support.  For her diabetes, she is currently taking NPH insulin 8 units subcutaneous at bedtime.  Her FBS was 84 this am and her blood sugar was 104 one hour  postbreakfast today.  Pt's FBS values have been 97 or less and her 1 hr postmeal blood sugar values are usually < 140.  No hypoglycemia.  On ROS today,doing well.   She feels the baby moving.  She denies SOB at rest, cough, fever, chills or chest pain.  No abd pain or diarrhea. No dysuria or hematuria.  Patient denies vaginal spotting or bleeding.    ROS  Please see under HPI.    Allergies  Allergies   Allergen Reactions     Seasonal Allergies        Medications  Current Outpatient Medications   Medication Sig Dispense Refill     aspirin (ASA) 81 MG EC tablet Take 1 tablet (81 mg) by mouth daily 100 tablet 1     Blood Glucose Monitoring Suppl (ONETOUCH VERIO FLEX SYSTEM) w/Device KIT 1 each 4 times daily 1 kit 0     insulin NPH (NOVOLIN N VIAL) 100 UNIT/ML vial Inject 8 units subcutaneous at hs. 3 mL 3     insulin pen needle (BD CARINA U/F) 32G X 4 MM miscellaneous Use 1 daily as directed. 100 each 1     insulin syringe-needle U-100 (31G X 5/16\" 0.3 ML) 31G X 5/16\" 0.3 ML miscellaneous Use 1 syringes daily or as directed. 100 each 1     insulin syringe-needle U-100 (BD INSULIN SYRINGE ULTRAFINE) 31G X 5/16\" 0.3 ML miscellaneous Use daily. 100 each 3     Lancets (ONETOUCH DELICA PLUS [id number]) MISC 100 each by In Vitro route 4 times daily 100 each prn     ONETOUCH VERIO IQ test strip Use to test blood sugar 4 times daily or as directed.  Ok to substitute alternative if insurance prefers. 200 strip 3     Phenylephrine HCl (AFRIN ALLERGY NA)        Prenatal Vit-Fe Fumarate-FA (PRENATAL VITAMINS PO)          Family History  family history includes Cerebrovascular Disease in her father; Hyperlipidemia in her sister; Liver Cancer in her mother.    Social History   reports that she has never smoked. She has never used smokeless tobacco. She reports previous " alcohol use. She reports that she does not use drugs.     Past Medical History  None.    Physical Exam    No exam today.    RESULTS  Creatinine   Date Value Ref Range Status   06/02/2020 0.47 (L) 0.52 - 1.04 mg/dL Final     GFR Estimate   Date Value Ref Range Status   06/02/2020 >90 >60 mL/min/[1.73_m2] Final     Comment:     Non  GFR Calc  Starting 12/18/2018, serum creatinine based estimated GFR (eGFR) will be   calculated using the Chronic Kidney Disease Epidemiology Collaboration   (CKD-EPI) equation.       Hemoglobin A1C   Date Value Ref Range Status   06/25/2020 5.2 0 - 5.6 % Final     Comment:     Normal <5.7% Prediabetes 5.7-6.4%  Diabetes 6.5% or higher - adopted from ADA   consensus guidelines.       ALT   Date Value Ref Range Status   06/02/2020 16 0 - 50 U/L Final     AST   Date Value Ref Range Status   06/02/2020 11 0 - 45 U/L Final     TSH   Date Value Ref Range Status   06/25/2020 1.51 0.40 - 4.00 mU/L Final       ASSESSMENT/PLAN:    1.  GESTATIONAL DIABETES: Continue NPH 8 units subcutaneous at bedtime.  Pt is to continue to check her fasting blood sugar each am and also check her blood sugar 1 hr postmeal.  She is aware that we would like to see her FBS 95 or < and her 1 hr postmeal blood sugar 140 or <.  Her BP at home has been good.  Timothy's A1C was good at 5.2 % on 6/25/2020.  Her TSH was normal on 6/25/2020.    2.  PREGNANCY: Pt is [id number] pregnant and followed by OB at [place].    3.  FOLLOW UP: with me on 7/24/2020 at 1 pm.   Pt has my contact number to call if she has any questions or if her blood sugar values are above target.  I also asked her to use [employer] if she has any questions.

## 2020-07-15 ENCOUNTER — VIRTUAL VISIT (OUTPATIENT)
Dept: ENDOCRINOLOGY | Facility: CLINIC | Age: 30
End: 2020-07-15
Payer: COMMERCIAL

## 2020-07-15 DIAGNOSIS — O24.414 GESTATIONAL DIABETES REQUIRING INSULIN: Primary | ICD-10-CM

## 2020-07-15 NOTE — LETTER
7/15/2020       RE: Timothy Crowell  [address]  [place] [address]     Dear Colleague,    Thank you for referring your patient, Timothy Crowell, to the [facility] at [facility]. Please see a copy of my visit note below.    Timothy Crowell is a 30 year old female who is being evaluated via a billable telephone visit.          Kristy Lomas MA    Patients Glucose Data was Sent in [employer] Message     Due to the COVID 19 pandemic this visit was converted to a telephone visit in order to help prevent spread of infection in this patient and the general population.    Time of start: 2:03 pm  Time of end: 2:14 pm  Total duration of telephone visit: 11 minutes.    HPI  Timothy Crowell is a 30 year old female with gestational diabetes. Telephone visit today for diabetes follow up.   No previous hx of diabetes.  Pt was seen by Dr. Castro in our clinic on 4/8/2020.   This is Timothy's first pregnancy and she is currently [id number] pregnant.  Her SHELDON is 10/22/2020. Pt is being seen by OB at [place].  She has been followed by Christine Torres RD for diabetes education and support.  For her diabetes, she is currently taking NPH insulin 8 units subcutaneous at bedtime.  Her FBS was 84 this am and her blood sugar was 104 one hour  postbreakfast today.  Pt's FBS values have been 97 or less and her 1 hr postmeal blood sugar values are usually < 140.  No hypoglycemia.  On ROS today,doing well.   She feels the baby moving.  She denies SOB at rest, cough, fever, chills or chest pain.  No abd pain or diarrhea. No dysuria or hematuria.  Patient denies vaginal spotting or bleeding.    ROS  Please see under HPI.    Allergies  Allergies   Allergen Reactions     Seasonal Allergies        Medications  Current Outpatient Medications   Medication Sig Dispense Refill     aspirin (ASA) 81 MG EC tablet Take 1 tablet (81 mg) by mouth daily 100 tablet 1     Blood  "Glucose Monitoring Suppl (ONETOUCH VERIO FLEX SYSTEM) w/Device KIT 1 each 4 times daily 1 kit 0     insulin NPH (NOVOLIN N VIAL) 100 UNIT/ML vial Inject 8 units subcutaneous at hs. 3 mL 3     insulin pen needle (BD CARINA U/F) 32G X 4 MM miscellaneous Use 1 daily as directed. 100 each 1     insulin syringe-needle U-100 (31G X 5/16\" 0.3 ML) 31G X 5/16\" 0.3 ML miscellaneous Use 1 syringes daily or as directed. 100 each 1     insulin syringe-needle U-100 (BD INSULIN SYRINGE ULTRAFINE) 31G X 5/16\" 0.3 ML miscellaneous Use daily. 100 each 3     Lancets (ONETOUCH DELICA PLUS [id number]) MISC 100 each by In Vitro route 4 times daily 100 each prn     ONETOUCH VERIO IQ test strip Use to test blood sugar 4 times daily or as directed.  Ok to substitute alternative if insurance prefers. 200 strip 3     Phenylephrine HCl (AFRIN ALLERGY NA)        Prenatal Vit-Fe Fumarate-FA (PRENATAL VITAMINS PO)          Family History  family history includes Cerebrovascular Disease in her father; Hyperlipidemia in her sister; Liver Cancer in her mother.    Social History   reports that she has never smoked. She has never used smokeless tobacco. She reports previous alcohol use. She reports that she does not use drugs.     Past Medical History  None.    Physical Exam    No exam today.    RESULTS  Creatinine   Date Value Ref Range Status   06/02/2020 0.47 (L) 0.52 - 1.04 mg/dL Final     GFR Estimate   Date Value Ref Range Status   06/02/2020 >90 >60 mL/min/[1.73_m2] Final     Comment:     Non  GFR Calc  Starting 12/18/2018, serum creatinine based estimated GFR (eGFR) will be   calculated using the Chronic Kidney Disease Epidemiology Collaboration   (CKD-EPI) equation.       Hemoglobin A1C   Date Value Ref Range Status   06/25/2020 5.2 0 - 5.6 % Final     Comment:     Normal <5.7% Prediabetes 5.7-6.4%  Diabetes 6.5% or higher - adopted from ADA   consensus guidelines.       ALT   Date Value Ref Range Status   06/02/2020 16 0 - 50 " U/L Final     AST   Date Value Ref Range Status   06/02/2020 11 0 - 45 U/L Final     TSH   Date Value Ref Range Status   06/25/2020 1.51 0.40 - 4.00 mU/L Final       ASSESSMENT/PLAN:    1.  GESTATIONAL DIABETES: Continue NPH 8 units subcutaneous at bedtime.  Pt is to continue to check her fasting blood sugar each am and also check her blood sugar 1 hr postmeal.  She is aware that we would like to see her FBS 95 or < and her 1 hr postmeal blood sugar 140 or <.  Her BP at home has been good.  Timothy's A1C was good at 5.2 % on 6/25/2020.  Her TSH was normal on 6/25/2020.    2.  PREGNANCY: Pt is [id number] pregnant and followed by OB at [place].    3.  FOLLOW UP: with me on 7/24/2020 at 1 pm.   Pt has my contact number to call if she has any questions or if her blood sugar values are above target.  I also asked her to use [employer] if she has any questions.      Again, thank you for allowing me to participate in the care of your patient.      Sincerely,    Alma Salamanca PA-C

## 2020-07-22 ENCOUNTER — PRENATAL OFFICE VISIT (OUTPATIENT)
Dept: OBGYN | Facility: CLINIC | Age: 30
End: 2020-07-22
Payer: COMMERCIAL

## 2020-07-22 ENCOUNTER — OFFICE VISIT (OUTPATIENT)
Dept: MATERNAL FETAL MEDICINE | Facility: CLINIC | Age: 30
End: 2020-07-22
Attending: OBSTETRICS & GYNECOLOGY
Payer: COMMERCIAL

## 2020-07-22 ENCOUNTER — HOSPITAL ENCOUNTER (OUTPATIENT)
Dept: ULTRASOUND IMAGING | Facility: CLINIC | Age: 30
End: 2020-07-22
Attending: OBSTETRICS & GYNECOLOGY
Payer: COMMERCIAL

## 2020-07-22 VITALS
BODY MASS INDEX: 27.34 KG/M2 | OXYGEN SATURATION: 100 % | DIASTOLIC BLOOD PRESSURE: 59 MMHG | HEART RATE: 101 BPM | SYSTOLIC BLOOD PRESSURE: 96 MMHG | WEIGHT: 140 LBS

## 2020-07-22 DIAGNOSIS — O24.119 TYPE 2 DIABETES MELLITUS IN PREGNANCY, UNSPECIFIED TRIMESTER: Primary | ICD-10-CM

## 2020-07-22 DIAGNOSIS — Z34.01 ENCOUNTER FOR SUPERVISION OF NORMAL FIRST PREGNANCY IN FIRST TRIMESTER: ICD-10-CM

## 2020-07-22 DIAGNOSIS — O24.414 INSULIN CONTROLLED GESTATIONAL DIABETES MELLITUS (GDM) IN SECOND TRIMESTER: ICD-10-CM

## 2020-07-22 DIAGNOSIS — O24.119 TYPE 2 DIABETES MELLITUS IN PREGNANCY, UNSPECIFIED TRIMESTER: ICD-10-CM

## 2020-07-22 LAB
HBA1C MFR BLD: 5 % (ref 0–5.6)
HGB BLD-MCNC: 10.7 G/DL (ref 11.7–15.7)

## 2020-07-22 PROCEDURE — 76816 OB US FOLLOW-UP PER FETUS: CPT

## 2020-07-22 PROCEDURE — 00000218 ZZHCL STATISTIC OBHBG - HEMOGLOBIN: Performed by: OBSTETRICS & GYNECOLOGY

## 2020-07-22 PROCEDURE — 36415 COLL VENOUS BLD VENIPUNCTURE: CPT | Performed by: OBSTETRICS & GYNECOLOGY

## 2020-07-22 PROCEDURE — 99207 ZZC PRENATAL VISIT: CPT | Performed by: OBSTETRICS & GYNECOLOGY

## 2020-07-22 PROCEDURE — 83036 HEMOGLOBIN GLYCOSYLATED A1C: CPT | Performed by: OBSTETRICS & GYNECOLOGY

## 2020-07-22 NOTE — NURSING NOTE
Chief Complaint   Patient presents with     Prenatal Care       Initial BP 96/59   Pulse 101   Wt 63.5 kg (140 lb)   LMP 2020 (Approximate)   SpO2 100%   BMI 27.34 kg/m   Estimated body mass index is 27.34 kg/m  as calculated from the following:    Height as of 3/11/20: 1.524 m (5').    Weight as of this encounter: 63.5 kg (140 lb).  BP completed using cuff size: regular    Questioned patient about current smoking habits.  Pt. has never smoked.          The following HM Due: NONE      The following patient reported/Care Every where data was sent to:  P ABSTRACT QUALITY INITIATIVES [89099]  2018 pap at [facility] not enough samples no results      n/a and patient has appointment for today

## 2020-07-22 NOTE — PROGRESS NOTES
Doing well.   Blood sugars all normal right now, 8 units NPH at bedtime.   Feels heavier, weight gain appropriate.   Good fetal movement.   MFM ultrasound after this appointment.  Aware of induction at 39 weeks, MFM ultrasounds.  Discussed TDaP next visit.     Childbirth classes? Info given. Probably will just read a book.   Plan on breastfeeding? Yes: will check with insurance on pump  Birthcontrol? None, [facility]   Sex on ultrasound? girl  Circumsion? NA  Peds doc? Unsure, discussed [place] and [place].    RTC 4 weeks.

## 2020-07-22 NOTE — PROGRESS NOTES
Please see ultrasound report under imaging tab for details on ultrasound performed today.    Isabella Bowman MD  , OB/GYN  Maternal-Fetal Medicine  [email or web address]  [phone] (Academic office)  [phone] (Pager)

## 2020-07-23 NOTE — PROGRESS NOTES
"Timothy Crowell is a 30 year old female who is being evaluated via a billable telephone visit.      The patient has been notified of following:     \"This telephone visit will be conducted via a call between you and your physician/provider. We have found that certain health care needs can be provided without the need for a physical exam.  This service lets us provide the care you need with a short phone conversation.  If a prescription is necessary we can send it directly to your pharmacy.  If lab work is needed we can place an order for that and you can then stop by our lab to have the test done at a later time.    Telephone visits are billed at different rates depending on your insurance coverage. During this emergency period, for some insurers they may be billed the same as an in-person visit.  Please reach out to your insurance provider with any questions.    If during the course of the call the physician/provider feels a telephone visit is not appropriate, you will not be charged for this service.\"    Patient has given verbal consent for Telephone visit?  Yes    What phone number would you like to be contacted at? [phone]    How would you like to obtain your AVS? Elfego Lomas MA    Patients Glucose Data was Sent in [employer] Message     Due to the COVID 19 pandemic this visit was converted to a telephone visit in order to help prevent spread of infection in this patient and the general population.    Time of start: 1:00 pm  Time of end: 1:09 pm    Total duration of telephone visit: 9 minutes.      HPI  Timothy Crowell is a 30 year old female with gestational diabetes. Telephone visit today for diabetes follow up.   No previous hx of diabetes.  Pt was seen by Dr. Castro in our clinic on 4/8/2020.   This is Timothy's first pregnancy and she is currently [id number] pregnant.  Her SHELDON is 10/22/2020. Pt is being seen by OB at [place].  She has been followed by Christine Torres RD for diabetes " "education and support.  For her diabetes, she is currently taking NPH insulin 8 units subcutaneous at bedtime.  Her FBS values are good at 93, 92, 86, 94, 84 and 90.  Her 1 hr postmeal blood sugars are also good and < 140.  No hypoglycemia.  Pt's A1C was 5.0 % on 7/22/2020.  On ROS today,doing well.   She feels the baby moving.  She denies SOB at rest, cough, fever, chills or chest pain.  No abd pain or diarrhea. No dysuria or hematuria.  Patient denies vaginal spotting or bleeding.    ROS  Please see under HPI.    Allergies  Allergies   Allergen Reactions     Seasonal Allergies        Medications  Current Outpatient Medications   Medication Sig Dispense Refill     aspirin (ASA) 81 MG EC tablet Take 1 tablet (81 mg) by mouth daily 100 tablet 1     Blood Glucose Monitoring Suppl (ONETOUCH VERIO FLEX SYSTEM) w/Device KIT 1 each 4 times daily 1 kit 0     insulin NPH (NOVOLIN N VIAL) 100 UNIT/ML vial Inject 8 units subcutaneous at hs. 3 mL 3     insulin pen needle (BD CARINA U/F) 32G X 4 MM miscellaneous Use 1 daily as directed. 100 each 1     insulin syringe-needle U-100 (31G X 5/16\" 0.3 ML) 31G X 5/16\" 0.3 ML miscellaneous Use 1 syringes daily or as directed. 100 each 1     insulin syringe-needle U-100 (BD INSULIN SYRINGE ULTRAFINE) 31G X 5/16\" 0.3 ML miscellaneous Use daily. 100 each 3     Lancets (ONETOUCH DELICA PLUS [id number]) MISC 100 each by In Vitro route 4 times daily 100 each prn     ONETOUCH VERIO IQ test strip Use to test blood sugar 4 times daily or as directed.  Ok to substitute alternative if insurance prefers. 200 strip 3     Phenylephrine HCl (AFRIN ALLERGY NA)        Prenatal Vit-Fe Fumarate-FA (PRENATAL VITAMINS PO)          Family History  family history includes Cerebrovascular Disease in her father; Hyperlipidemia in her sister; Liver Cancer in her mother.    Social History   reports that she has never smoked. She has never used smokeless tobacco. She reports previous alcohol use. She reports that " she does not use drugs.     Past Medical History  None.    Physical Exam    No exam today.    RESULTS  Creatinine   Date Value Ref Range Status   06/02/2020 0.47 (L) 0.52 - 1.04 mg/dL Final     GFR Estimate   Date Value Ref Range Status   06/02/2020 >90 >60 mL/min/[1.73_m2] Final     Comment:     Non  GFR Calc  Starting 12/18/2018, serum creatinine based estimated GFR (eGFR) will be   calculated using the Chronic Kidney Disease Epidemiology Collaboration   (CKD-EPI) equation.       Hemoglobin A1C   Date Value Ref Range Status   07/22/2020 5.0 0 - 5.6 % Final     Comment:     Normal <5.7% Prediabetes 5.7-6.4%  Diabetes 6.5% or higher - adopted from ADA   consensus guidelines.       ALT   Date Value Ref Range Status   06/02/2020 16 0 - 50 U/L Final     AST   Date Value Ref Range Status   06/02/2020 11 0 - 45 U/L Final     TSH   Date Value Ref Range Status   06/25/2020 1.51 0.40 - 4.00 mU/L Final       ASSESSMENT/PLAN:    1.  GESTATIONAL DIABETES: Continue NPH 8 units subcutaneous at bedtime.  Pt is to continue to check her fasting blood sugar each am and also check her blood sugar 1 hr postmeal.  She is aware that we would like to see her FBS 95 or < and her 1 hr postmeal blood sugar 140 or <.  Her BP at home today is 97/60.  Her weight is stable.  Her TSH was normal on 6/25/2020.    2.  PREGNANCY: Pt is [id number] pregnant and followed by OB at [place].    3.  FOLLOW UP: with me on 8/11/2020 at 1 PM.  Pt has my contact number to call if she has any questions or if her blood sugar values are above target.  I also asked her to use [employer] if she has any questions.

## 2020-07-24 ENCOUNTER — VIRTUAL VISIT (OUTPATIENT)
Dept: ENDOCRINOLOGY | Facility: CLINIC | Age: 30
End: 2020-07-24
Payer: COMMERCIAL

## 2020-07-24 DIAGNOSIS — O24.414 GESTATIONAL DIABETES REQUIRING INSULIN: Primary | ICD-10-CM

## 2020-07-24 NOTE — LETTER
"7/24/2020       RE: Timothy Crowell  [address]  [facility] [address]     Dear Colleague,    Thank you for referring your patient, Timothy Crowell, to the [facility] ENDOCRINOLOGY at [facility]. Please see a copy of my visit note below.    Timothy Crowell is a 30 year old female who is being evaluated via a billable telephone visit.      The patient has been notified of following:     \"This telephone visit will be conducted via a call between you and your physician/provider. We have found that certain health care needs can be provided without the need for a physical exam.  This service lets us provide the care you need with a short phone conversation.  If a prescription is necessary we can send it directly to your pharmacy.  If lab work is needed we can place an order for that and you can then stop by our lab to have the test done at a later time.    Telephone visits are billed at different rates depending on your insurance coverage. During this emergency period, for some insurers they may be billed the same as an in-person visit.  Please reach out to your insurance provider with any questions.    If during the course of the call the physician/provider feels a telephone visit is not appropriate, you will not be charged for this service.\"    Patient has given verbal consent for Telephone visit?  Yes    What phone number would you like to be contacted at? [phone]    How would you like to obtain your AVS? Elfego Lomas MA    Patients Glucose Data was Sent in [id number] Message     Due to the COVID 19 pandemic this visit was converted to a telephone visit in order to help prevent spread of infection in this patient and the general population.    Time of start: 1:00 pm  Time of end: 1:09 pm    Total duration of telephone visit: 9 minutes.      HPI  Timothy Crowell is a 30 year old female with gestational diabetes. Telephone visit today " "for diabetes follow up.   No previous hx of diabetes.  Pt was seen by Dr. Castro in our clinic on 4/8/2020.   This is Timothy's first pregnancy and she is currently [id number] pregnant.  Her SHELDON is 10/22/2020. Pt is being seen by OB at [place].  She has been followed by Christine Torres RD for diabetes education and support.  For her diabetes, she is currently taking NPH insulin 8 units subcutaneous at bedtime.  Her FBS values are good at 93, 92, 86, 94, 84 and 90.  Her 1 hr postmeal blood sugars are also good and < 140.  No hypoglycemia.  Pt's A1C was 5.0 % on 7/22/2020.  On ROS today,doing well.   She feels the baby moving.  She denies SOB at rest, cough, fever, chills or chest pain.  No abd pain or diarrhea. No dysuria or hematuria.  Patient denies vaginal spotting or bleeding.    ROS  Please see under HPI.    Allergies  Allergies   Allergen Reactions     Seasonal Allergies        Medications  Current Outpatient Medications   Medication Sig Dispense Refill     aspirin (ASA) 81 MG EC tablet Take 1 tablet (81 mg) by mouth daily 100 tablet 1     Blood Glucose Monitoring Suppl (ONETOUCH VERIO FLEX SYSTEM) w/Device KIT 1 each 4 times daily 1 kit 0     insulin NPH (NOVOLIN N VIAL) 100 UNIT/ML vial Inject 8 units subcutaneous at hs. 3 mL 3     insulin pen needle (BD CARINA U/F) 32G X 4 MM miscellaneous Use 1 daily as directed. 100 each 1     insulin syringe-needle U-100 (31G X 5/16\" 0.3 ML) 31G X 5/16\" 0.3 ML miscellaneous Use 1 syringes daily or as directed. 100 each 1     insulin syringe-needle U-100 (BD INSULIN SYRINGE ULTRAFINE) 31G X 5/16\" 0.3 ML miscellaneous Use daily. 100 each 3     Lancets (ONETOUCH DELICA PLUS [id number]) MISC 100 each by In Vitro route 4 times daily 100 each prn     ONETOUCH VERIO IQ test strip Use to test blood sugar 4 times daily or as directed.  Ok to substitute alternative if insurance prefers. 200 strip 3     Phenylephrine HCl (AFRIN ALLERGY NA)        Prenatal Vit-Fe Fumarate-FA (PRENATAL " VITAMINS PO)          Family History  family history includes Cerebrovascular Disease in her father; Hyperlipidemia in her sister; Liver Cancer in her mother.    Social History   reports that she has never smoked. She has never used smokeless tobacco. She reports previous alcohol use. She reports that she does not use drugs.     Past Medical History  None.    Physical Exam    No exam today.    RESULTS  Creatinine   Date Value Ref Range Status   06/02/2020 0.47 (L) 0.52 - 1.04 mg/dL Final     GFR Estimate   Date Value Ref Range Status   06/02/2020 >90 >60 mL/min/[1.73_m2] Final     Comment:     Non  GFR Calc  Starting 12/18/2018, serum creatinine based estimated GFR (eGFR) will be   calculated using the Chronic Kidney Disease Epidemiology Collaboration   (CKD-EPI) equation.       Hemoglobin A1C   Date Value Ref Range Status   07/22/2020 5.0 0 - 5.6 % Final     Comment:     Normal <5.7% Prediabetes 5.7-6.4%  Diabetes 6.5% or higher - adopted from ADA   consensus guidelines.       ALT   Date Value Ref Range Status   06/02/2020 16 0 - 50 U/L Final     AST   Date Value Ref Range Status   06/02/2020 11 0 - 45 U/L Final     TSH   Date Value Ref Range Status   06/25/2020 1.51 0.40 - 4.00 mU/L Final       ASSESSMENT/PLAN:    1.  GESTATIONAL DIABETES: Continue NPH 8 units subcutaneous at bedtime.  Pt is to continue to check her fasting blood sugar each am and also check her blood sugar 1 hr postmeal.  She is aware that we would like to see her FBS 95 or < and her 1 hr postmeal blood sugar 140 or <.  Her BP at home today is 97/60.  Her weight is stable.  Her TSH was normal on 6/25/2020.    2.  PREGNANCY: Pt is [id number] pregnant and followed by OB at [place].    3.  FOLLOW UP: with me on 8/11/2020 at 1 PM.  Pt has my contact number to call if she has any questions or if her blood sugar values are above target.  I also asked her to use [employer] if she has any questions.    Again, thank you for allowing me to  participate in the care of your patient.      Sincerely,    Alma Salamanca PA-C

## 2020-08-09 DIAGNOSIS — O24.414 INSULIN CONTROLLED GESTATIONAL DIABETES MELLITUS (GDM) IN FIRST TRIMESTER: ICD-10-CM

## 2020-08-10 RX ORDER — HUMAN INSULIN 100 [IU]/ML
INJECTION, SUSPENSION SUBCUTANEOUS
Qty: 10 ML | Refills: 11 | Status: ON HOLD | OUTPATIENT
Start: 2020-08-10 | End: 2020-10-19

## 2020-08-10 NOTE — TELEPHONE ENCOUNTER
Insulin - refilled per clinic  NOVOLIN INSULIN NPH U-100 HUMAN   Last Virtual Visit: 7/24/20, Next scheduled appointment 8/11/20  Medication is historical entry

## 2020-08-10 NOTE — PROGRESS NOTES
"Timothy Crowell is a 30 year old female who is being evaluated via a billable telephone visit.      The patient has been notified of following:     \"This telephone visit will be conducted via a call between you and your physician/provider. We have found that certain health care needs can be provided without the need for a physical exam.  This service lets us provide the care you need with a short phone conversation.  If a prescription is necessary we can send it directly to your pharmacy.  If lab work is needed we can place an order for that and you can then stop by our lab to have the test done at a later time.    Telephone visits are billed at different rates depending on your insurance coverage. During this emergency period, for some insurers they may be billed the same as an in-person visit.  Please reach out to your insurance provider with any questions.    If during the course of the call the physician/provider feels a telephone visit is not appropriate, you will not be charged for this service.\"    Patient has given verbal consent for Telephone visit?  Yes    What phone number would you like to be contacted at? [phone]    How would you like to obtain your AVS? Elfego Lomas MA    Patients Glucose Data was Sent in [employer] Message      Due to the COVID 19 pandemic this visit was converted to a telephone visit in order to help prevent spread of infection in this patient and the general population.    Time of start: 1:00 pm  Time of end: 1:09 pm  Total duration of telephone visit: 9 minutes.    HPI  Timothy Crowell is a 30 year old female with gestational diabetes. Telephone visit today for diabetes follow up.   No previous hx of diabetes.  Pt was seen by Dr. Castro in our clinic on 4/8/2020.  I have been monitoring her blood sugars on a regular basis.  This is Timothy's first pregnancy and she is currently [id number] pregnant.  Her SHELDON is 10/22/2020. Pt is being seen by OB at [place].  For her " "diabetes, she is currently taking NPH insulin 8 units subcutaneous at bedtime.  Her FBS values have been 87, 97,90, 90, 99, 90, 85 and 90.  Her 1 hr postmeal blood sugars are also good and < 140.  Her blood sugar was 103 2 hrs postbreakfast yesterday.  No hypoglycemia.  Pt's A1C was 5.0 % on 7/22/2020. She is anemic.  On ROS today,she feels the baby moving a lot.  She denies SOB at rest, cough, fever, chills or chest pain.  No abd pain or diarrhea. No dysuria or hematuria.  Patient denies vaginal spotting or bleeding.    ROS  Please see under HPI.    Allergies  Allergies   Allergen Reactions     Seasonal Allergies        Medications  Current Outpatient Medications   Medication Sig Dispense Refill     aspirin (ASA) 81 MG EC tablet Take 1 tablet (81 mg) by mouth daily 100 tablet 1     Blood Glucose Monitoring Suppl (ONETOUCH VERIO FLEX SYSTEM) w/Device KIT 1 each 4 times daily 1 kit 0     Continuous Blood Gluc  (FREESTYLE LEONELA READER) LIMA 1 Device daily 1 Device 0     Continuous Blood Gluc Sensor (FREESTYLE LEONELA 14 DAY SENSOR) MISC Change every 14 days. Check fasting blood sugar and 1 hr postmeal blood sugar DAILY. 8 each 3     insulin NPH (NOVOLIN N VIAL) 100 UNIT/ML vial INJECT 6 UNITS SUBCUTANEOUS AT AT BEDTIME 10 mL 11     insulin pen needle (BD CARINA U/F) 32G X 4 MM miscellaneous Use 1 daily as directed. 100 each 1     insulin syringe-needle U-100 (31G X 5/16\" 0.3 ML) 31G X 5/16\" 0.3 ML miscellaneous Use 1 syringes daily or as directed. 100 each 1     insulin syringe-needle U-100 (BD INSULIN SYRINGE ULTRAFINE) 31G X 5/16\" 0.3 ML miscellaneous Use daily. 100 each 3     Lancets (ONETOUCH DELICA PLUS [id number]) MISC 100 each by In Vitro route 4 times daily 100 each prn     ONETOUCH VERIO IQ test strip Use to test blood sugar 4 times daily or as directed.  Ok to substitute alternative if insurance prefers. 200 strip 3     Phenylephrine HCl (AFRIN ALLERGY NA)        Prenatal Vit-Fe Fumarate-FA (PRENATAL " VITAMINS PO)          Family History  family history includes Cerebrovascular Disease in her father; Hyperlipidemia in her sister; Liver Cancer in her mother.    Social History   reports that she has never smoked. She has never used smokeless tobacco. She reports previous alcohol use. She reports that she does not use drugs.     .  No children.  Not working at this time during COVID19 pandemic.    Past Medical History  None.    Physical Exam    No exam today.    RESULTS  Creatinine   Date Value Ref Range Status   06/02/2020 0.47 (L) 0.52 - 1.04 mg/dL Final     GFR Estimate   Date Value Ref Range Status   06/02/2020 >90 >60 mL/min/[1.73_m2] Final     Comment:     Non  GFR Calc  Starting 12/18/2018, serum creatinine based estimated GFR (eGFR) will be   calculated using the Chronic Kidney Disease Epidemiology Collaboration   (CKD-EPI) equation.       Hemoglobin A1C   Date Value Ref Range Status   07/22/2020 5.0 0 - 5.6 % Final     Comment:     Normal <5.7% Prediabetes 5.7-6.4%  Diabetes 6.5% or higher - adopted from ADA   consensus guidelines.       ALT   Date Value Ref Range Status   06/02/2020 16 0 - 50 U/L Final     AST   Date Value Ref Range Status   06/02/2020 11 0 - 45 U/L Final     TSH   Date Value Ref Range Status   06/25/2020 1.51 0.40 - 4.00 mU/L Final       ASSESSMENT/PLAN:    1.  GESTATIONAL DIABETES: Continue NPH 8 units subcutaneous at bedtime.  Hang states she has been getting hungry.  I told her to eat and continue to check her FBS each am and 1 hr postmeal blood sugar values. I told her if her blood sugars are above target, I will increase her insulin use.  She is aware that we would like to see her FBS 95 or < and her 1 hr postmeal blood sugar 140 or <.  Her BP at home and at OB appointments have been good.  Her TSH was normal on 6/25/2020.    2.  PREGNANCY: Pt is [id number] pregnant and followed by OB at [place].    3.  FOLLOW UP: with me on 8/27/2020 at noon.  I will have our  CDE call pt next week to review her blood sugar readings since Timothy plans to increase her food intake. I will be out of the office next week.  Timothy tells me her OB staff are monitoring her weight.  Pt has my contact number to call if she has any questions or if her blood sugar values are above target.  I also asked her to use [employer] if she has any questions.

## 2020-08-11 ENCOUNTER — VIRTUAL VISIT (OUTPATIENT)
Dept: ENDOCRINOLOGY | Facility: CLINIC | Age: 30
End: 2020-08-11
Payer: COMMERCIAL

## 2020-08-11 DIAGNOSIS — O24.414 GESTATIONAL DIABETES REQUIRING INSULIN: Primary | ICD-10-CM

## 2020-08-11 RX ORDER — FLASH GLUCOSE SENSOR
1 KIT MISCELLANEOUS DAILY
Qty: 1 DEVICE | Refills: 0 | Status: SHIPPED | OUTPATIENT
Start: 2020-08-11 | End: 2022-05-19

## 2020-08-11 RX ORDER — FLASH GLUCOSE SENSOR
KIT MISCELLANEOUS
Qty: 8 EACH | Refills: 3 | Status: SHIPPED | OUTPATIENT
Start: 2020-08-11 | End: 2022-05-19

## 2020-08-11 NOTE — LETTER
"8/11/2020       RE: Timothy Crowell  [address]  [facility] [address]     Dear Colleague,    Thank you for referring your patient, Timothy Crowell, to the [facility] ENDOCRINOLOGY at [facility]. Please see a copy of my visit note below.    Timothy Corwell is a 30 year old female who is being evaluated via a billable telephone visit.      The patient has been notified of following:     \"This telephone visit will be conducted via a call between you and your physician/provider. We have found that certain health care needs can be provided without the need for a physical exam.  This service lets us provide the care you need with a short phone conversation.  If a prescription is necessary we can send it directly to your pharmacy.  If lab work is needed we can place an order for that and you can then stop by our lab to have the test done at a later time.    Telephone visits are billed at different rates depending on your insurance coverage. During this emergency period, for some insurers they may be billed the same as an in-person visit.  Please reach out to your insurance provider with any questions.    If during the course of the call the physician/provider feels a telephone visit is not appropriate, you will not be charged for this service.\"    Patient has given verbal consent for Telephone visit?  Yes    What phone number would you like to be contacted at? [phone]    How would you like to obtain your AVS? Elfego Lomas MA    Patients Glucose Data was Sent in [employer] Message      Due to the COVID 19 pandemic this visit was converted to a telephone visit in order to help prevent spread of infection in this patient and the general population.    Time of start: 1:00 pm  Time of end: 1:09 pm  Total duration of telephone visit: 9 minutes.    HPI  Timothy Crowell is a 30 year old female with gestational diabetes. Telephone visit today for " "diabetes follow up.   No previous hx of diabetes.  Pt was seen by Dr. Castro in our clinic on 4/8/2020.  I have been monitoring her blood sugars on a regular basis.  This is Timothy's first pregnancy and she is currently 65vm0pal pregnant.  Her SHELDON is 10/22/2020. Pt is being seen by OB at [place].  For her diabetes, she is currently taking NPH insulin 8 units subcutaneous at bedtime.  Her FBS values have been 87, 97,90, 90, 99, 90, 85 and 90.  Her 1 hr postmeal blood sugars are also good and < 140.  Her blood sugar was 103 2 hrs postbreakfast yesterday.  No hypoglycemia.  Pt's A1C was 5.0 % on 7/22/2020. She is anemic.  On ROS today,she feels the baby moving a lot.  She denies SOB at rest, cough, fever, chills or chest pain.  No abd pain or diarrhea. No dysuria or hematuria.  Patient denies vaginal spotting or bleeding.    ROS  Please see under HPI.    Allergies  Allergies   Allergen Reactions     Seasonal Allergies        Medications  Current Outpatient Medications   Medication Sig Dispense Refill     aspirin (ASA) 81 MG EC tablet Take 1 tablet (81 mg) by mouth daily 100 tablet 1     Blood Glucose Monitoring Suppl (ONETOUCH VERIO FLEX SYSTEM) w/Device KIT 1 each 4 times daily 1 kit 0     Continuous Blood Gluc  (FREESTYLE LEONELA READER) LIMA 1 Device daily 1 Device 0     Continuous Blood Gluc Sensor (FREESTYLE LEONELA 14 DAY SENSOR) MISC Change every 14 days. Check fasting blood sugar and 1 hr postmeal blood sugar DAILY. 8 each 3     insulin NPH (NOVOLIN N VIAL) 100 UNIT/ML vial INJECT 6 UNITS SUBCUTANEOUS AT AT BEDTIME 10 mL 11     insulin pen needle (BD CARINA U/F) 32G X 4 MM miscellaneous Use 1 daily as directed. 100 each 1     insulin syringe-needle U-100 (31G X 5/16\" 0.3 ML) 31G X 5/16\" 0.3 ML miscellaneous Use 1 syringes daily or as directed. 100 each 1     insulin syringe-needle U-100 (BD INSULIN SYRINGE ULTRAFINE) 31G X 5/16\" 0.3 ML miscellaneous Use daily. 100 each 3     Lancets (ONETOUCH DELICA PLUS " [id number]) MISC 100 each by In Vitro route 4 times daily 100 each prn     ONETOUCH VERIO IQ test strip Use to test blood sugar 4 times daily or as directed.  Ok to substitute alternative if insurance prefers. 200 strip 3     Phenylephrine HCl (AFRIN ALLERGY NA)        Prenatal Vit-Fe Fumarate-FA (PRENATAL VITAMINS PO)          Family History  family history includes Cerebrovascular Disease in her father; Hyperlipidemia in her sister; Liver Cancer in her mother.    Social History   reports that she has never smoked. She has never used smokeless tobacco. She reports previous alcohol use. She reports that she does not use drugs.     .  No children.  Not working at this time during COVID19 pandemic.    Past Medical History  None.    Physical Exam    No exam today.    RESULTS  Creatinine   Date Value Ref Range Status   06/02/2020 0.47 (L) 0.52 - 1.04 mg/dL Final     GFR Estimate   Date Value Ref Range Status   06/02/2020 >90 >60 mL/min/[1.73_m2] Final     Comment:     Non  GFR Calc  Starting 12/18/2018, serum creatinine based estimated GFR (eGFR) will be   calculated using the Chronic Kidney Disease Epidemiology Collaboration   (CKD-EPI) equation.       Hemoglobin A1C   Date Value Ref Range Status   07/22/2020 5.0 0 - 5.6 % Final     Comment:     Normal <5.7% Prediabetes 5.7-6.4%  Diabetes 6.5% or higher - adopted from ADA   consensus guidelines.       ALT   Date Value Ref Range Status   06/02/2020 16 0 - 50 U/L Final     AST   Date Value Ref Range Status   06/02/2020 11 0 - 45 U/L Final     TSH   Date Value Ref Range Status   06/25/2020 1.51 0.40 - 4.00 mU/L Final       ASSESSMENT/PLAN:    1.  GESTATIONAL DIABETES: Continue NPH 8 units subcutaneous at bedtime.  Hang states she has been getting hungry.  I told her to eat and continue to check her FBS each am and 1 hr postmeal blood sugar values. I told her if her blood sugars are above target, I will increase her insulin use.  She is aware that we  would like to see her FBS 95 or < and her 1 hr postmeal blood sugar 140 or <.  Her BP at home and at OB appointments have been good.  Her TSH was normal on 6/25/2020.    2.  PREGNANCY: Pt is [id number] pregnant and followed by OB at [place].    3.  FOLLOW UP: with me on 8/27/2020 at noon.  I will have our CDE call pt next week to review her blood sugar readings since Timothy plans to increase her food intake. I will be out of the office next week.  Timothy tells me her OB staff are monitoring her weight.  Pt has my contact number to call if she has any questions or if her blood sugar values are above target.  I also asked her to use [employer] if she has any questions.    Again, thank you for allowing me to participate in the care of your patient.      Sincerely,    Alma Salamanca PA-C

## 2020-08-17 ENCOUNTER — PRENATAL OFFICE VISIT (OUTPATIENT)
Dept: OBGYN | Facility: CLINIC | Age: 30
End: 2020-08-17
Payer: COMMERCIAL

## 2020-08-17 VITALS
WEIGHT: 144.2 LBS | DIASTOLIC BLOOD PRESSURE: 65 MMHG | HEART RATE: 98 BPM | TEMPERATURE: 98.2 F | BODY MASS INDEX: 28.16 KG/M2 | SYSTOLIC BLOOD PRESSURE: 105 MMHG

## 2020-08-17 DIAGNOSIS — R45.89 DEPRESSED MOOD: ICD-10-CM

## 2020-08-17 DIAGNOSIS — O09.90 HIGH-RISK PREGNANCY, UNSPECIFIED TRIMESTER: Primary | ICD-10-CM

## 2020-08-17 DIAGNOSIS — O24.414 INSULIN CONTROLLED GESTATIONAL DIABETES MELLITUS (GDM) IN THIRD TRIMESTER: ICD-10-CM

## 2020-08-17 DIAGNOSIS — Z23 NEED FOR TDAP VACCINATION: ICD-10-CM

## 2020-08-17 PROCEDURE — 90471 IMMUNIZATION ADMIN: CPT | Performed by: OBSTETRICS & GYNECOLOGY

## 2020-08-17 PROCEDURE — 99207 ZZC PRENATAL VISIT: CPT | Performed by: OBSTETRICS & GYNECOLOGY

## 2020-08-17 PROCEDURE — 90715 TDAP VACCINE 7 YRS/> IM: CPT | Performed by: OBSTETRICS & GYNECOLOGY

## 2020-08-17 ASSESSMENT — ANXIETY QUESTIONNAIRES
2. NOT BEING ABLE TO STOP OR CONTROL WORRYING: SEVERAL DAYS
3. WORRYING TOO MUCH ABOUT DIFFERENT THINGS: SEVERAL DAYS
7. FEELING AFRAID AS IF SOMETHING AWFUL MIGHT HAPPEN: MORE THAN HALF THE DAYS
6. BECOMING EASILY ANNOYED OR IRRITABLE: NEARLY EVERY DAY
5. BEING SO RESTLESS THAT IT IS HARD TO SIT STILL: NOT AT ALL

## 2020-08-17 ASSESSMENT — PATIENT HEALTH QUESTIONNAIRE - PHQ9
SUM OF ALL RESPONSES TO PHQ QUESTIONS 1-9: 10
5. POOR APPETITE OR OVEREATING: MORE THAN HALF THE DAYS

## 2020-08-17 NOTE — PROGRESS NOTES
30w0d  Feeling depressed. Easily tearful for past 2-3 weeks. Family is all in [place]. Patient feels lonely and sad.  works a lot. Feels safe at home.   PHQ 8/17/2020   PHQ-9 Total Score 10   Q9: Thoughts of better off dead/self-harm past 2 weeks Not at all     No thoughts of harming self. Contracted for safety. Discussed treatment with selective serotonin reuptake inhibitor and/or therapy. Patient wants to think about it and give it more time. She states she will try to get out of the house more. Plan to revisit this at next appt.     Taking insulin at bedtime.   Eating quinoa instead of rice. Trying to eat more vegetables and meat. But really likes noodles.   Weight gain on track.  Last growth  7/22: EFW  921g (51%), normal fluid.  Discussed rationale for IOL at 38-39 weeks.     RTC 2 weeks, sooner PRN.  Isabella Dia MD

## 2020-08-18 ENCOUNTER — VIRTUAL VISIT (OUTPATIENT)
Dept: EDUCATION SERVICES | Facility: CLINIC | Age: 30
End: 2020-08-18
Payer: COMMERCIAL

## 2020-08-18 DIAGNOSIS — O24.119 TYPE 2 DIABETES MELLITUS AFFECTING PREGNANCY, ANTEPARTUM: Primary | ICD-10-CM

## 2020-08-18 RX ORDER — INSULIN ASPART 100 [IU]/ML
INJECTION, SOLUTION INTRAVENOUS; SUBCUTANEOUS
Qty: 15 ML | Refills: 0 | Status: SHIPPED | OUTPATIENT
Start: 2020-08-18 | End: 2020-08-24

## 2020-08-18 NOTE — PROGRESS NOTES
Gestational Diabetes Follow-up    Subjective/Objective:    Timothy Crowell sent in blood glucose log for review. Last date of communication was: 20.    Gestational diabetes is being managed with diet, activity and medications    Taking diabetes medications:   yes:     Diabetes Medication(s)     Insulin       insulin NPH (NOVOLIN N VIAL) 100 UNIT/ML vial    INJECT 6 UNITS SUBCUTANEOUS AT AT BEDTIME          Estimated Date of Delivery: Oct 26, 2020    BG/Food Log:   Starting   Fastin,80,83,90,120,97,90  1 hr After breakfast: 120,130,129,137,100,140,141  1 hr after lunch: 142,189,148,138,142,140  1 hr after dinner: 131,94,113,127,135,138    Assessment:    Fasting blood glucoses: 57% in target.  After breakfast: 87% in target.  After lunch: 17% in target.  After dinner: 100% in target.    Teaching done on mealtime insulin.  Timothy seemed to understand the information.    Plan/Response:  Novolog 2 units prior to lunch      Any diabetes medication dose changes were made via the CDE Protocol and Collaborative Practice Agreement with the patient's referring provider. A copy of this encounter was shared with the provider.

## 2020-08-18 NOTE — Clinical Note
Rajat Frances,  Could you order some Novolog for this patient to use before lunch?  Miladis is out this week.  Let me know.  I told her I would call her back when I had the order.  Thanks!  Vaishali

## 2020-08-24 ENCOUNTER — MYC MEDICAL ADVICE (OUTPATIENT)
Dept: EDUCATION SERVICES | Facility: CLINIC | Age: 30
End: 2020-08-24

## 2020-08-24 ENCOUNTER — OFFICE VISIT (OUTPATIENT)
Dept: MATERNAL FETAL MEDICINE | Facility: CLINIC | Age: 30
End: 2020-08-24
Attending: OBSTETRICS & GYNECOLOGY
Payer: COMMERCIAL

## 2020-08-24 ENCOUNTER — HOSPITAL ENCOUNTER (OUTPATIENT)
Dept: ULTRASOUND IMAGING | Facility: CLINIC | Age: 30
End: 2020-08-24
Attending: OBSTETRICS & GYNECOLOGY
Payer: COMMERCIAL

## 2020-08-24 DIAGNOSIS — O24.119 TYPE 2 DIABETES MELLITUS IN PREGNANCY, UNSPECIFIED TRIMESTER: Primary | ICD-10-CM

## 2020-08-24 DIAGNOSIS — O24.119 TYPE 2 DIABETES MELLITUS IN PREGNANCY, UNSPECIFIED TRIMESTER: ICD-10-CM

## 2020-08-24 PROCEDURE — 76816 OB US FOLLOW-UP PER FETUS: CPT

## 2020-08-25 ENCOUNTER — MYC MEDICAL ADVICE (OUTPATIENT)
Dept: EDUCATION SERVICES | Facility: CLINIC | Age: 30
End: 2020-08-25

## 2020-08-25 DIAGNOSIS — O24.119 TYPE 2 DIABETES MELLITUS AFFECTING PREGNANCY, ANTEPARTUM: Primary | ICD-10-CM

## 2020-08-25 NOTE — TELEPHONE ENCOUNTER
Phone call to Hang.  [place] ordered.  She will start Novolog today.  Vaishali Montano RN,CDE

## 2020-08-25 NOTE — PROGRESS NOTES
Please see ultrasound report under Imaging tab for details of today's ultrasound.    Mildred Martin MD  Maternal-Fetal Medicine

## 2020-08-26 NOTE — PROGRESS NOTES
"Timothy Crowell is a 30 year old female who is being evaluated via a billable telephone visit.      The patient has been notified of following:     \"This telephone visit will be conducted via a call between you and your physician/provider. We have found that certain health care needs can be provided without the need for a physical exam.  This service lets us provide the care you need with a short phone conversation.  If a prescription is necessary we can send it directly to your pharmacy.  If lab work is needed we can place an order for that and you can then stop by our lab to have the test done at a later time.    Telephone visits are billed at different rates depending on your insurance coverage. During this emergency period, for some insurers they may be billed the same as an in-person visit.  Please reach out to your insurance provider with any questions.    If during the course of the call the physician/provider feels a telephone visit is not appropriate, you will not be charged for this service.\"    Patient has given verbal consent for Telephone visit?  Yes    What phone number would you like to be contacted at? [phone]    How would you like to obtain your AVS? Elfego Lomas MA    Patients Glucose Data was Sent in [employer] Message      Due to the COVID 19 pandemic this visit was converted to a telephone visit in order to help prevent spread of infection in this patient and the general population.    Time of start: 12:00 pm  Time of end: 12:13 pm  Total duration of telephone visit: 13 minutes.    HPI  Timothy Crowell is a 30 year old female with gestational diabetes. Telephone visit today for diabetes follow up.   No previous hx of diabetes.  Pt was seen by Dr. Castro in our clinic on 4/8/2020.  I have been monitoring her blood sugars on a regular basis.  This is Timothy's first pregnancy and she is currently 06ta7iuv pregnant.  Pt is being seen by OB at [place].  For her diabetes, she is " "currently taking NPH insulin 8 units subcutaneous at bedtime.  She has Novolog for meal coverage if needed.  She tells me she has not been as hungry and is eating less.   Her FBS values have been 79, 88, 124,100, 96, 107 and 96.  Her 1 hr postmeal blood sugars for the past few days have been < 140.  No hypoglycemia.  Pt's A1C was 5.0 % on 7/22/2020. She is anemic.  On ROS today,she feels the baby moving a lot.  Hang also reports feeling \"sad\" intermittently.  She denies any thoughts of self harm.  She is home alone all day and get lonely.  She denies SOB at rest, cough, fever, chills or chest pain.  No abd pain or diarrhea. No dysuria or hematuria.  Patient denies vaginal spotting or bleeding.    ROS  Please see under HPI.    Allergies  Allergies   Allergen Reactions     Seasonal Allergies        Medications  Current Outpatient Medications   Medication Sig Dispense Refill     aspirin (ASA) 81 MG EC tablet Take 1 tablet (81 mg) by mouth daily 100 tablet 1     Blood Glucose Monitoring Suppl (ONETOUCH VERIO FLEX SYSTEM) w/Device KIT 1 each 4 times daily 1 kit 0     Continuous Blood Gluc  (FREESTYLE LEONELA READER) LIMA 1 Device daily 1 Device 0     Continuous Blood Gluc Sensor (FREESTYLE LEONELA 14 DAY SENSOR) MISC Change every 14 days. Check fasting blood sugar and 1 hr postmeal blood sugar DAILY. 8 each 3     insulin NPH (NOVOLIN N VIAL) 100 UNIT/ML vial INJECT 6 UNITS SUBCUTANEOUS AT AT BEDTIME 10 mL 11     insulin pen needle (32G X 4 MM) 32G X 4 MM miscellaneous Use 4 pen needles daily or as directed. 200 each 4     insulin pen needle (BD CARINA U/F) 32G X 4 MM miscellaneous Use 1 daily as directed. 100 each 1     insulin syringe-needle U-100 (31G X 5/16\" 0.3 ML) 31G X 5/16\" 0.3 ML miscellaneous Use 1 syringes daily or as directed. 100 each 1     insulin syringe-needle U-100 (BD INSULIN SYRINGE ULTRAFINE) 31G X 5/16\" 0.3 ML miscellaneous Use daily. 100 each 3     Lancets (ONETOUCH DELICA PLUS [id number]) MISC 100 " each by In Vitro route 4 times daily 100 each prn     NOVOLOG FLEXPEN 100 UNIT/ML soln 1-3 units before lunch as directed.  Increase as directed. 15 mL 0     ONETOUCH VERIO IQ test strip Use to test blood sugar 4 times daily or as directed.  Ok to substitute alternative if insurance prefers. 200 strip 3     Phenylephrine HCl (AFRIN ALLERGY NA)        Prenatal Vit-Fe Fumarate-FA (PRENATAL VITAMINS PO)          Family History  family history includes Cerebrovascular Disease in her father; Hyperlipidemia in her sister; Liver Cancer in her mother.    Social History   reports that she has never smoked. She has never used smokeless tobacco. She reports previous alcohol use. She reports that she does not use drugs.     .  No children.  Not working at this time during COVID19 pandemic.    Past Medical History  None.    Physical Exam    No exam today.    RESULTS  Creatinine   Date Value Ref Range Status   06/02/2020 0.47 (L) 0.52 - 1.04 mg/dL Final     GFR Estimate   Date Value Ref Range Status   06/02/2020 >90 >60 mL/min/[1.73_m2] Final     Comment:     Non  GFR Calc  Starting 12/18/2018, serum creatinine based estimated GFR (eGFR) will be   calculated using the Chronic Kidney Disease Epidemiology Collaboration   (CKD-EPI) equation.       Hemoglobin A1C   Date Value Ref Range Status   07/22/2020 5.0 0 - 5.6 % Final     Comment:     Normal <5.7% Prediabetes 5.7-6.4%  Diabetes 6.5% or higher - adopted from ADA   consensus guidelines.       ALT   Date Value Ref Range Status   06/02/2020 16 0 - 50 U/L Final     AST   Date Value Ref Range Status   06/02/2020 11 0 - 45 U/L Final     TSH   Date Value Ref Range Status   06/25/2020 1.51 0.40 - 4.00 mU/L Final       ASSESSMENT/PLAN:    1.  GESTATIONAL DIABETES: Continue NPH 8 units subcutaneous at bedtime.  Pt has Novolog to use if needed for meal coverage.  She is aware that we would like to see her FBS 95 or < and her 1 hr postmeal blood sugar 140 or <.  Her  "BP at home and at OB appointments have been good.  Her TSH was normal on 6/25/2020.    2.  PREGNANCY: Pt is 12fu2bzk pregnant and followed by OB at [place].    3.  DEPRESSION: She does not want to see psych at this time.  She denies thoughts of self harm or suicide and she tells me she has spoken to her OB staff about her \" sadness\".    3.  FOLLOW UP: with me next week on 9/3/2020.  Pt has my contact number to call if she has any questions or if her blood sugar values are above target.  I also asked her to use [employer] if she has any questions.                                              "

## 2020-08-27 ENCOUNTER — VIRTUAL VISIT (OUTPATIENT)
Dept: ENDOCRINOLOGY | Facility: CLINIC | Age: 30
End: 2020-08-27
Payer: COMMERCIAL

## 2020-08-27 DIAGNOSIS — O24.414 GESTATIONAL DIABETES REQUIRING INSULIN: Primary | ICD-10-CM

## 2020-08-27 NOTE — LETTER
"8/27/2020       RE: Timothy Crowell  [address]  [facility] [address]     Dear Colleague,    Thank you for referring your patient, Timothy Crowell, to the [facility] ENDOCRINOLOGY at [facility]. Please see a copy of my visit note below.    Timothy Crowell is a 30 year old female who is being evaluated via a billable telephone visit.      The patient has been notified of following:     \"This telephone visit will be conducted via a call between you and your physician/provider. We have found that certain health care needs can be provided without the need for a physical exam.  This service lets us provide the care you need with a short phone conversation.  If a prescription is necessary we can send it directly to your pharmacy.  If lab work is needed we can place an order for that and you can then stop by our lab to have the test done at a later time.    Telephone visits are billed at different rates depending on your insurance coverage. During this emergency period, for some insurers they may be billed the same as an in-person visit.  Please reach out to your insurance provider with any questions.    If during the course of the call the physician/provider feels a telephone visit is not appropriate, you will not be charged for this service.\"    Patient has given verbal consent for Telephone visit?  Yes    What phone number would you like to be contacted at? [phone]    How would you like to obtain your AVS? Elfego Lomas MA    Patients Glucose Data was Sent in [employer] Message      Due to the COVID 19 pandemic this visit was converted to a telephone visit in order to help prevent spread of infection in this patient and the general population.    Time of start: 12:00 pm  Time of end: 12:13 pm  Total duration of telephone visit: 13 minutes.    HPI  Timothy Crowell is a 30 year old female with gestational diabetes. Telephone visit today " "for diabetes follow up.   No previous hx of diabetes.  Pt was seen by Dr. Castro in our clinic on 4/8/2020.  I have been monitoring her blood sugars on a regular basis.  This is Timothy's first pregnancy and she is currently 70fk2hra pregnant.  Pt is being seen by OB at [place].  For her diabetes, she is currently taking NPH insulin 8 units subcutaneous at bedtime.  She has Novolog for meal coverage if needed.  She tells me she has not been as hungry and is eating less.   Her FBS values have been 79, 88, 124,100, 96, 107 and 96.  Her 1 hr postmeal blood sugars for the past few days have been < 140.  No hypoglycemia.  Pt's A1C was 5.0 % on 7/22/2020. She is anemic.  On ROS today,she feels the baby moving a lot.  Timothy also reports feeling \"sad\" intermittently.  She denies any thoughts of self harm.  She is home alone all day and get lonely.  She denies SOB at rest, cough, fever, chills or chest pain.  No abd pain or diarrhea. No dysuria or hematuria.  Patient denies vaginal spotting or bleeding.    ROS  Please see under HPI.    Allergies  Allergies   Allergen Reactions     Seasonal Allergies        Medications  Current Outpatient Medications   Medication Sig Dispense Refill     aspirin (ASA) 81 MG EC tablet Take 1 tablet (81 mg) by mouth daily 100 tablet 1     Blood Glucose Monitoring Suppl (ONETOUCH VERIO FLEX SYSTEM) w/Device KIT 1 each 4 times daily 1 kit 0     Continuous Blood Gluc  (FREESTYLE LEONELA READER) LIMA 1 Device daily 1 Device 0     Continuous Blood Gluc Sensor (FREESTYLE LEONELA 14 DAY SENSOR) MISC Change every 14 days. Check fasting blood sugar and 1 hr postmeal blood sugar DAILY. 8 each 3     insulin NPH (NOVOLIN N VIAL) 100 UNIT/ML vial INJECT 6 UNITS SUBCUTANEOUS AT AT BEDTIME 10 mL 11     insulin pen needle (32G X 4 MM) 32G X 4 MM miscellaneous Use 4 pen needles daily or as directed. 200 each 4     insulin pen needle (BD CARINA U/F) 32G X 4 MM miscellaneous Use 1 daily as directed. 100 each 1     " "insulin syringe-needle U-100 (31G X 5/16\" 0.3 ML) 31G X 5/16\" 0.3 ML miscellaneous Use 1 syringes daily or as directed. 100 each 1     insulin syringe-needle U-100 (BD INSULIN SYRINGE ULTRAFINE) 31G X 5/16\" 0.3 ML miscellaneous Use daily. 100 each 3     Lancets (ONETOUCH DELICA PLUS [id number]) MISC 100 each by In Vitro route 4 times daily 100 each prn     NOVOLOG FLEXPEN 100 UNIT/ML soln 1-3 units before lunch as directed.  Increase as directed. 15 mL 0     ONETOUCH VERIO IQ test strip Use to test blood sugar 4 times daily or as directed.  Ok to substitute alternative if insurance prefers. 200 strip 3     Phenylephrine HCl (AFRIN ALLERGY NA)        Prenatal Vit-Fe Fumarate-FA (PRENATAL VITAMINS PO)          Family History  family history includes Cerebrovascular Disease in her father; Hyperlipidemia in her sister; Liver Cancer in her mother.    Social History   reports that she has never smoked. She has never used smokeless tobacco. She reports previous alcohol use. She reports that she does not use drugs.     .  No children.  Not working at this time during COVID19 pandemic.    Past Medical History  None.    Physical Exam    No exam today.    RESULTS  Creatinine   Date Value Ref Range Status   06/02/2020 0.47 (L) 0.52 - 1.04 mg/dL Final     GFR Estimate   Date Value Ref Range Status   06/02/2020 >90 >60 mL/min/[1.73_m2] Final     Comment:     Non  GFR Calc  Starting 12/18/2018, serum creatinine based estimated GFR (eGFR) will be   calculated using the Chronic Kidney Disease Epidemiology Collaboration   (CKD-EPI) equation.       Hemoglobin A1C   Date Value Ref Range Status   07/22/2020 5.0 0 - 5.6 % Final     Comment:     Normal <5.7% Prediabetes 5.7-6.4%  Diabetes 6.5% or higher - adopted from ADA   consensus guidelines.       ALT   Date Value Ref Range Status   06/02/2020 16 0 - 50 U/L Final     AST   Date Value Ref Range Status   06/02/2020 11 0 - 45 U/L Final     TSH   Date Value Ref Range " "Status   06/25/2020 1.51 0.40 - 4.00 mU/L Final       ASSESSMENT/PLAN:    1.  GESTATIONAL DIABETES: Continue NPH 8 units subcutaneous at bedtime.  Pt has Novolog to use if needed for meal coverage.  She is aware that we would like to see her FBS 95 or < and her 1 hr postmeal blood sugar 140 or <.  Her BP at home and at OB appointments have been good.  Her TSH was normal on 6/25/2020.    2.  PREGNANCY: Pt is 81db0uoi pregnant and followed by OB at [place].    3.  DEPRESSION: She does not want to see psych at this time.  She denies thoughts of self harm or suicide and she tells me she has spoken to her OB staff about her \" sadness\".    3.  FOLLOW UP: with me next week on 9/3/2020.  Pt has my contact number to call if she has any questions or if her blood sugar values are above target.  I also asked her to use [employer] if she has any questions.    Again, thank you for allowing me to participate in the care of your patient.      Sincerely,  Alma Salamanca PA-C  "

## 2020-08-28 ENCOUNTER — VIRTUAL VISIT (OUTPATIENT)
Dept: EDUCATION SERVICES | Facility: CLINIC | Age: 30
End: 2020-08-28
Payer: COMMERCIAL

## 2020-08-28 DIAGNOSIS — O24.414 INSULIN CONTROLLED GESTATIONAL DIABETES MELLITUS (GDM) DURING PREGNANCY, ANTEPARTUM: Primary | ICD-10-CM

## 2020-08-28 NOTE — PROGRESS NOTES
Gestational Diabetes Follow-up    Subjective/Objective:    Timothy Crowell sent in blood glucose log for review. Last date of communication was: 8/18/20.    Gestational diabetes is being managed with diet and activity and insulin    Taking diabetes medications:   yes:     Diabetes Medication(s)     Insulin       insulin NPH (NOVOLIN N VIAL) 100 UNIT/ML vial    INJECT 6 UNITS SUBCUTANEOUS AT AT BEDTIME     NOVOLOG FLEXPEN 100 UNIT/ML soln    1-3 units before lunch as directed.  Increase as directed.          Estimated Date of Delivery: Oct 26, 2020    BG/Food Log:           Assessment:  Timothy has started using Novolog 2-3 units before lunch at times when she is having a higher carb meal. She feels she can eat more now.  She is focusing on healthy food choices, fruits and vegetables.  She has seen RD.    Plan/Response:  Follow up with Miladis Salamanca as scheduled.  Any diabetes medication dose changes were made via the CDE Protocol and Collaborative Practice Agreement with the patient's referring provider. A copy of this encounter was shared with the provider.

## 2020-08-31 ENCOUNTER — OFFICE VISIT (OUTPATIENT)
Dept: MATERNAL FETAL MEDICINE | Facility: CLINIC | Age: 30
End: 2020-08-31
Attending: OBSTETRICS & GYNECOLOGY
Payer: COMMERCIAL

## 2020-08-31 ENCOUNTER — HOSPITAL ENCOUNTER (OUTPATIENT)
Dept: ULTRASOUND IMAGING | Facility: CLINIC | Age: 30
End: 2020-08-31
Attending: OBSTETRICS & GYNECOLOGY
Payer: COMMERCIAL

## 2020-08-31 DIAGNOSIS — O24.119 TYPE 2 DIABETES MELLITUS IN PREGNANCY, UNSPECIFIED TRIMESTER: Primary | ICD-10-CM

## 2020-08-31 DIAGNOSIS — O24.119 TYPE 2 DIABETES MELLITUS IN PREGNANCY, UNSPECIFIED TRIMESTER: ICD-10-CM

## 2020-08-31 PROCEDURE — 76819 FETAL BIOPHYS PROFIL W/O NST: CPT

## 2020-08-31 NOTE — PROGRESS NOTES
"Please see \"Imaging\" tab under \"Chart Review\" for details of today's US.    Juana Ewing, DO    "

## 2020-09-02 ENCOUNTER — PRENATAL OFFICE VISIT (OUTPATIENT)
Dept: OBGYN | Facility: CLINIC | Age: 30
End: 2020-09-02
Payer: COMMERCIAL

## 2020-09-02 VITALS
TEMPERATURE: 98 F | BODY MASS INDEX: 28.32 KG/M2 | WEIGHT: 145 LBS | HEART RATE: 88 BPM | DIASTOLIC BLOOD PRESSURE: 65 MMHG | SYSTOLIC BLOOD PRESSURE: 112 MMHG

## 2020-09-02 DIAGNOSIS — O24.414 INSULIN CONTROLLED GESTATIONAL DIABETES MELLITUS (GDM) IN THIRD TRIMESTER: ICD-10-CM

## 2020-09-02 DIAGNOSIS — O09.90 HIGH-RISK PREGNANCY, UNSPECIFIED TRIMESTER: Primary | ICD-10-CM

## 2020-09-02 DIAGNOSIS — O36.8130 DECREASED FETAL MOVEMENTS IN THIRD TRIMESTER, SINGLE OR UNSPECIFIED FETUS: ICD-10-CM

## 2020-09-02 PROCEDURE — 59025 FETAL NON-STRESS TEST: CPT | Performed by: OBSTETRICS & GYNECOLOGY

## 2020-09-02 PROCEDURE — 99207 ZZC PRENATAL VISIT: CPT | Performed by: OBSTETRICS & GYNECOLOGY

## 2020-09-02 ASSESSMENT — PATIENT HEALTH QUESTIONNAIRE - PHQ9: SUM OF ALL RESPONSES TO PHQ QUESTIONS 1-9: 12

## 2020-09-02 NOTE — PROGRESS NOTES
[facility]    CC: routine prenatal visit.    S:Timothy Crowell is a 30 year old  at 32w2d by 7w2d us who presents today for routine prenatal visit.  Patient reports that she continues to have ups and downs in her mood, but feels better this week than last.  She does not want to talk to anyone about it and does not want referral to [facility].  Patient denies any contractions, vaginal bleeding, leakage of fluid.  She states that last night she ate tejeda with rice and had a blood sugar of 228.  Other than that she has had good control of her sugars.  She has noticed decreased fetal movement last night and today.     Pregnancy notable for:  -Type 2 DM  -depression    O:   Patient Vitals for the past 24 hrs:   BP Temp Temp src Pulse Weight   20 0848 112/65 98  F (36.7  C) Oral 88 65.8 kg (145 lb)   ]  Exam:  General- awake, alert, answering questions appropriately, appears comfortable  CV- regular rate  Lung- breathing comfortably on room air  Ext- bilateral lower extremities with no edema    [place]- 138 bpm  Fundal height-33 cm    NST- Baseline 140bpm, moderate variability, +accelerations, no decelerations  [place]- 0 contractions / 10 minutes    A&P: Timothy Crowell is a 30 year old  at 32w2d by 7w2d us who presents today for routine prenatal visit.    (O09.90) High-risk pregnancy, unspecified trimester  (primary encounter diagnosis)  Comment: Reviewed patient's mood symptoms.  Discussed options of medication and/or counseling with referral to [facility].  Patient declines referral to [facility].  She states that her mood is worse when she talks about it.  Again encouraged the patient to think about the option of counseling.  Reviewed s/sx of PTL, PPROM, and fetal kick counts.  Plan:  Next visit scheduled with Dr. Zambrano on 2020.    (O36.8130) Decreased fetal movements in third trimester, single or unspecified fetus  Comment: Patient with reported decreased fetal  movement.    Plan: FETAL NON-STRESS TEST        Category 1, reactive NST    (O24.414) Insulin controlled gestational diabetes mellitus (GDM) in third trimester  Comment: Feels her control is overall going ok.  She is taking 6u of NPH at night and novolog 2-3 units before lunch.    Plan: Plan twice weekly testing and growth ultrasounds.    Has her next DE visit at 9/3/2020    Charissa Garcia MD

## 2020-09-02 NOTE — PATIENT INSTRUCTIONS
Patient Education     Adapting to Pregnancy: Third Trimester    Although common during pregnancy, some discomforts may seem worse in the final weeks. Simple lifestyle changes can help. Take care of yourself. And ask your partner to help out with small tasks.  Limiting leg problems  Ways to combat leg issues:    Wear support hose all day.    Avoid snug shoes and clothes that bind, like tight pants and socks with elastic tops.    Sit with your feet and legs raised often.  Caring for your breasts  Tips to follow include:    Wash with plain water. Avoid using harsh soaps or rubbing alcohol. They may cause dryness.    Wear a nursing bra for extra support. It can also hide any leaks from your nipples.  Controlling hemorrhoids  Ways to avoid hemorrhoids include:    Eat foods that are high in fiber. Also, exercise and drink enough fluids. This will reduce constipation and hemorrhoids.    Sleep and nap on your side. This limits pressure on the veins of your rectum.    Try not to stand or sit for long periods.  Controlling back pain  As your body changes during pregnancy, your back must work in new ways. Back pain is due to many causes. Physical changes in your body can strain your back and its supporting muscles. Also, hormones (chemicals that carry messages throughout the body) increase during pregnancy. This can affect how your muscles and joints work together. All of these changes can lead to pain. Pain may be felt in the upper or lower back. Pain is also common in the pelvis. Some pregnant women have sciatica. This is pain caused by pressure on the sciatic nerve running down the back of the leg. Ask your healthcare provider for specific tips and exercises to help control your back pain.  Tips to help you rest  Good rest and sleep will help you feel better. Here are some ideas:    Ask your partner to massage your shoulders, neck, or back.    Limit the errands you do each day.    Lie down in the afternoon or after work  for a few minutes.    Take a warm bath before you go to sleep.    Drink warm milk or teas without caffeine.    Avoid coffee, black tea, and cola.  Stopping heartburn    Avoid spicy, greasy, fried, or acidic foods.    Eat small amounts more often. Eat slowly.   Wait 2 hours after eating before lying down.    Sleep with your upper body raised 6 inches.   Managing mood swings  Ways to manage mood swings include:    Know that mood changes are normal.    Exercise often, but get plenty of rest.    Address any concerns and limit stress. Talking to your partner, other women, or your healthcare provider may help.  Dealing with urinary frequency  Tips to deal with having to urinate often include:    Drink plenty of water all day. If you drink a lot in the evening, though, you may have to get up more in the night.    Limit coffee, black tea, and cola.  Date Last Reviewed: 2/1/2018 2000-2019 [employer]. [address] [place] [address]. All rights reserved. This information is not intended as a substitute for professional medical care. Always follow your healthcare professional's instructions.           Patient Education     Pregnancy: Your Third Trimester Changes  As the baby grows, your body changes too. You may also see signs that your body is getting ready for labor. Be patient. Within a few more weeks, your baby will be born.  How you are changing  Your body is preparing for the birth of your baby. Some of the most common changes are listed below. If you have any questions or concerns, ask your healthcare provider:    You ll gain more weight from fluids, extra blood, and fat deposits.    Your breasts will grow as your body gets ready to feed the baby. They may be more tender. You may also notice a slight yellow or white discharge from the nipples.    Discharge from your vagina may increase. This is normal.    You might see some skin color changes on your forehead, cheeks, or nose. Most of these  will go away after you deliver.  How your baby is growing       Month 7  Your baby can open and close his or her eyes and weighs around 4 pounds. If born prematurely (too early), your baby would likely survive with special care. Month 8  Your baby is building up body fat and weighs around 6 pounds. Month 9  Your baby weighs nearly 7 pounds and is about 19 to 21 inches long. In other words, any day now...   Date Last Reviewed: 2/1/2018 2000-2019 [employer]. [address] [place] [address], [place] [address]. All rights reserved. This information is not intended as a substitute for professional medical care. Always follow your healthcare professional's instructions.

## 2020-09-02 NOTE — PROGRESS NOTES
"Timothy Crowell is a 30 year old female who is being evaluated via a billable telephone visit.      The patient has been notified of following:     \"This telephone visit will be conducted via a call between you and your physician/provider. We have found that certain health care needs can be provided without the need for a physical exam.  This service lets us provide the care you need with a short phone conversation.  If a prescription is necessary we can send it directly to your pharmacy.  If lab work is needed we can place an order for that and you can then stop by our lab to have the test done at a later time.    Telephone visits are billed at different rates depending on your insurance coverage. During this emergency period, for some insurers they may be billed the same as an in-person visit.  Please reach out to your insurance provider with any questions.    If during the course of the call the physician/provider feels a telephone visit is not appropriate, you will not be charged for this service.\"    Patient has given verbal consent for Telephone visit?  Yes    What phone number would you like to be contacted at? [phone]    How would you like to obtain your AVS? Elfego Lomas MA    Patients Glucose Data was Sent in [employer] Message      Due to the COVID 19 pandemic this visit was converted to a telephone visit in order to help prevent spread of infection in this patient and the general population.    Time of start: 11:30 am  Time of end:  11:45 am  Total duration of telephone visit: 15 minutes.    HPI  Timothy Crowell is a 30 year old female with gestational diabetes. Telephone visit today for diabetes follow up.   No previous hx of diabetes.  Pt was seen by Dr. Castro in our clinic on 4/8/2020.  I have been monitoring her blood sugars on a regular basis.  This is Timothy's first pregnancy and she is currently [id number] pregnant.  Pt is being seen by OB at [place].  For her diabetes, she is " "currently taking NPH insulin 8 units subcutaneous at bedtime.  She has Novolog for meal coverage if she plans to have increase carbs for a meal.  Her FBS values have been in the 74-91 range.  Her 1 hr postmeal blood sugars for the past few days have been < 140.  One evening her postdinner bs was higher- she ate tejeda and increase rice intake and did not take enough Novolog.  No hypoglycemia.  Pt's A1C was 5.0 % on 7/22/2020. She is anemic.  On ROS today,she feels the baby moving today.  She reports feeling less \" sad\". She denies any thoughts of self harm.    Some allergies at this time from ragweed.  She denies SOB at rest, cough, fever, chills or chest pain.  No abd pain or diarrhea. No dysuria or hematuria.  Patient denies vaginal spotting or bleeding.    ROS  Please see under HPI.    Allergies  Allergies   Allergen Reactions     Seasonal Allergies        Medications  Current Outpatient Medications   Medication Sig Dispense Refill     aspirin (ASA) 81 MG EC tablet Take 1 tablet (81 mg) by mouth daily 100 tablet 1     Blood Glucose Monitoring Suppl (ONETOUCH VERIO FLEX SYSTEM) w/Device KIT 1 each 4 times daily 1 kit 0     Continuous Blood Gluc  (FREESTYLE LEONELA READER) LIMA 1 Device daily 1 Device 0     Continuous Blood Gluc Sensor (FREESTYLE LEONELA 14 DAY SENSOR) MISC Change every 14 days. Check fasting blood sugar and 1 hr postmeal blood sugar DAILY. 8 each 3     insulin NPH (NOVOLIN N VIAL) 100 UNIT/ML vial INJECT 6 UNITS SUBCUTANEOUS AT AT BEDTIME 10 mL 11     insulin pen needle (32G X 4 MM) 32G X 4 MM miscellaneous Use 4 pen needles daily or as directed. 200 each 4     insulin pen needle (BD CARINA U/F) 32G X 4 MM miscellaneous Use 1 daily as directed. 100 each 1     insulin syringe-needle U-100 (31G X 5/16\" 0.3 ML) 31G X 5/16\" 0.3 ML miscellaneous Use 1 syringes daily or as directed. 100 each 1     insulin syringe-needle U-100 (BD INSULIN SYRINGE ULTRAFINE) 31G X 5/16\" 0.3 ML miscellaneous Use daily. " 100 each 3     Lancets (ONETOUCH DELICA PLUS [id number]) MISC 100 each by In Vitro route 4 times daily 100 each prn     NOVOLOG FLEXPEN 100 UNIT/ML soln 1-3 units before lunch as directed.  Increase as directed. 15 mL 0     ONETOUCH VERIO IQ test strip Use to test blood sugar 4 times daily or as directed.  Ok to substitute alternative if insurance prefers. 200 strip 3     Phenylephrine HCl (AFRIN ALLERGY NA)        Prenatal Vit-Fe Fumarate-FA (PRENATAL VITAMINS PO)          Family History  family history includes Cerebrovascular Disease in her father; Hyperlipidemia in her sister; Liver Cancer in her mother.    Social History   reports that she has never smoked. She has never used smokeless tobacco. She reports previous alcohol use. She reports that she does not use drugs.     .  No children.  Not working at this time during COVID19 pandemic.    Past Medical History  None.    Physical Exam    No exam today.    RESULTS  Creatinine   Date Value Ref Range Status   06/02/2020 0.47 (L) 0.52 - 1.04 mg/dL Final     GFR Estimate   Date Value Ref Range Status   06/02/2020 >90 >60 mL/min/[1.73_m2] Final     Comment:     Non  GFR Calc  Starting 12/18/2018, serum creatinine based estimated GFR (eGFR) will be   calculated using the Chronic Kidney Disease Epidemiology Collaboration   (CKD-EPI) equation.       Hemoglobin A1C   Date Value Ref Range Status   07/22/2020 5.0 0 - 5.6 % Final     Comment:     Normal <5.7% Prediabetes 5.7-6.4%  Diabetes 6.5% or higher - adopted from ADA   consensus guidelines.       ALT   Date Value Ref Range Status   06/02/2020 16 0 - 50 U/L Final     AST   Date Value Ref Range Status   06/02/2020 11 0 - 45 U/L Final     TSH   Date Value Ref Range Status   06/25/2020 1.51 0.40 - 4.00 mU/L Final       ASSESSMENT/PLAN:    1.  GESTATIONAL DIABETES: Continue NPH 8 units subcutaneous at bedtime.  Pt has Novolog to use if needed for meal coverage.  She is aware that we would like to see  "her FBS 95 or < and her 1 hr postmeal blood sugar 140 or <.  Her BP at home and at OB appointments have been good.  Her TSH was normal on 6/25/2020.    2.  PREGNANCY: Pt is [id number] pregnant and followed by OB at [place].    3.  DEPRESSION:  She reports feeling less \"sad\".She does not want to see psych at this time.  She denies thoughts of self harm or suicide.    3.  FOLLOW UP: with me on 9/17/2020.  Pt has my contact number to call if she has any questions or if her blood sugar values are above target.  I also asked her to use [employer] if she has any questions.                                              "

## 2020-09-03 ENCOUNTER — HOSPITAL ENCOUNTER (OUTPATIENT)
Dept: ULTRASOUND IMAGING | Facility: CLINIC | Age: 30
End: 2020-09-03
Attending: OBSTETRICS & GYNECOLOGY
Payer: COMMERCIAL

## 2020-09-03 ENCOUNTER — OFFICE VISIT (OUTPATIENT)
Dept: MATERNAL FETAL MEDICINE | Facility: CLINIC | Age: 30
End: 2020-09-03
Attending: OBSTETRICS & GYNECOLOGY
Payer: COMMERCIAL

## 2020-09-03 ENCOUNTER — VIRTUAL VISIT (OUTPATIENT)
Dept: ENDOCRINOLOGY | Facility: CLINIC | Age: 30
End: 2020-09-03
Payer: COMMERCIAL

## 2020-09-03 DIAGNOSIS — O24.414 INSULIN CONTROLLED GESTATIONAL DIABETES MELLITUS (GDM) IN THIRD TRIMESTER: Primary | ICD-10-CM

## 2020-09-03 DIAGNOSIS — O24.119 TYPE 2 DIABETES MELLITUS IN PREGNANCY, UNSPECIFIED TRIMESTER: Primary | ICD-10-CM

## 2020-09-03 DIAGNOSIS — O24.119 TYPE 2 DIABETES MELLITUS IN PREGNANCY, UNSPECIFIED TRIMESTER: ICD-10-CM

## 2020-09-03 PROCEDURE — 76819 FETAL BIOPHYS PROFIL W/O NST: CPT

## 2020-09-03 NOTE — LETTER
"9/3/2020       RE: Timothy Crowell  [address]  [facility] [address]     Dear Colleague,    Thank you for referring your patient, Timothy Crowell, to the [facility] ENDOCRINOLOGY at [facility]. Please see a copy of my visit note below.    Timothy Crowell is a 30 year old female who is being evaluated via a billable telephone visit.      The patient has been notified of following:     \"This telephone visit will be conducted via a call between you and your physician/provider. We have found that certain health care needs can be provided without the need for a physical exam.  This service lets us provide the care you need with a short phone conversation.  If a prescription is necessary we can send it directly to your pharmacy.  If lab work is needed we can place an order for that and you can then stop by our lab to have the test done at a later time.    Telephone visits are billed at different rates depending on your insurance coverage. During this emergency period, for some insurers they may be billed the same as an in-person visit.  Please reach out to your insurance provider with any questions.    If during the course of the call the physician/provider feels a telephone visit is not appropriate, you will not be charged for this service.\"    Patient has given verbal consent for Telephone visit?  Yes    What phone number would you like to be contacted at? [phone]    How would you like to obtain your AVS? Elfego Lomas MA    Patients Glucose Data was Sent in [employer] Message      Due to the COVID 19 pandemic this visit was converted to a telephone visit in order to help prevent spread of infection in this patient and the general population.    Time of start: 11:30 am  Time of end:  11:45 am  Total duration of telephone visit: 15 minutes.    HPI  Timothy Crowell is a 30 year old female with gestational diabetes. Telephone visit today " "for diabetes follow up.   No previous hx of diabetes.  Pt was seen by Dr. Castro in our clinic on 4/8/2020.  I have been monitoring her blood sugars on a regular basis.  This is Timothy's first pregnancy and she is currently [id number] pregnant.  Pt is being seen by OB at [place].  For her diabetes, she is currently taking NPH insulin 8 units subcutaneous at bedtime.  She has Novolog for meal coverage if she plans to have increase carbs for a meal.  Her FBS values have been in the 74-91 range.  Her 1 hr postmeal blood sugars for the past few days have been < 140.  One evening her postdinner bs was higher- she ate tejeda and increase rice intake and did not take enough Novolog.  No hypoglycemia.  Pt's A1C was 5.0 % on 7/22/2020. She is anemic.  On ROS today,she feels the baby moving today.  She reports feeling less \" sad\". She denies any thoughts of self harm.    Some allergies at this time from ragweed.  She denies SOB at rest, cough, fever, chills or chest pain.  No abd pain or diarrhea. No dysuria or hematuria.  Patient denies vaginal spotting or bleeding.    ROS  Please see under HPI.    Allergies  Allergies   Allergen Reactions     Seasonal Allergies        Medications  Current Outpatient Medications   Medication Sig Dispense Refill     aspirin (ASA) 81 MG EC tablet Take 1 tablet (81 mg) by mouth daily 100 tablet 1     Blood Glucose Monitoring Suppl (ONETOUCH VERIO FLEX SYSTEM) w/Device KIT 1 each 4 times daily 1 kit 0     Continuous Blood Gluc  (FREESTYLE LEONELA READER) LIMA 1 Device daily 1 Device 0     Continuous Blood Gluc Sensor (FREESTYLE LEONELA 14 DAY SENSOR) MISC Change every 14 days. Check fasting blood sugar and 1 hr postmeal blood sugar DAILY. 8 each 3     insulin NPH (NOVOLIN N VIAL) 100 UNIT/ML vial INJECT 6 UNITS SUBCUTANEOUS AT AT BEDTIME 10 mL 11     insulin pen needle (32G X 4 MM) 32G X 4 MM miscellaneous Use 4 pen needles daily or as directed. 200 each 4     insulin pen needle (BD CARINA U/F) " "32G X 4 MM miscellaneous Use 1 daily as directed. 100 each 1     insulin syringe-needle U-100 (31G X 5/16\" 0.3 ML) 31G X 5/16\" 0.3 ML miscellaneous Use 1 syringes daily or as directed. 100 each 1     insulin syringe-needle U-100 (BD INSULIN SYRINGE ULTRAFINE) 31G X 5/16\" 0.3 ML miscellaneous Use daily. 100 each 3     Lancets (ONETOUCH DELICA PLUS [id number]) MISC 100 each by In Vitro route 4 times daily 100 each prn     NOVOLOG FLEXPEN 100 UNIT/ML soln 1-3 units before lunch as directed.  Increase as directed. 15 mL 0     ONETOUCH VERIO IQ test strip Use to test blood sugar 4 times daily or as directed.  Ok to substitute alternative if insurance prefers. 200 strip 3     Phenylephrine HCl (AFRIN ALLERGY NA)        Prenatal Vit-Fe Fumarate-FA (PRENATAL VITAMINS PO)          Family History  family history includes Cerebrovascular Disease in her father; Hyperlipidemia in her sister; Liver Cancer in her mother.    Social History   reports that she has never smoked. She has never used smokeless tobacco. She reports previous alcohol use. She reports that she does not use drugs.     .  No children.  Not working at this time during COVID19 pandemic.    Past Medical History  None.    Physical Exam    No exam today.    RESULTS  Creatinine   Date Value Ref Range Status   06/02/2020 0.47 (L) 0.52 - 1.04 mg/dL Final     GFR Estimate   Date Value Ref Range Status   06/02/2020 >90 >60 mL/min/[1.73_m2] Final     Comment:     Non  GFR Calc  Starting 12/18/2018, serum creatinine based estimated GFR (eGFR) will be   calculated using the Chronic Kidney Disease Epidemiology Collaboration   (CKD-EPI) equation.       Hemoglobin A1C   Date Value Ref Range Status   07/22/2020 5.0 0 - 5.6 % Final     Comment:     Normal <5.7% Prediabetes 5.7-6.4%  Diabetes 6.5% or higher - adopted from ADA   consensus guidelines.       ALT   Date Value Ref Range Status   06/02/2020 16 0 - 50 U/L Final     AST   Date Value Ref Range " "Status   06/02/2020 11 0 - 45 U/L Final     TSH   Date Value Ref Range Status   06/25/2020 1.51 0.40 - 4.00 mU/L Final       ASSESSMENT/PLAN:    1.  GESTATIONAL DIABETES: Continue NPH 8 units subcutaneous at bedtime.  Pt has Novolog to use if needed for meal coverage.  She is aware that we would like to see her FBS 95 or < and her 1 hr postmeal blood sugar 140 or <.  Her BP at home and at OB appointments have been good.  Her TSH was normal on 6/25/2020.    2.  PREGNANCY: Pt is [id number] pregnant and followed by OB at [place].    3.  DEPRESSION:  She reports feeling less \"sad\".She does not want to see psych at this time.  She denies thoughts of self harm or suicide.    3.  FOLLOW UP: with me on 9/17/2020.  Pt has my contact number to call if she has any questions or if her blood sugar values are above target.  I also asked her to use [employer] if she has any questions.        Sincerely,    Alma Salamanca PA-C  "

## 2020-09-05 ENCOUNTER — MYC MEDICAL ADVICE (OUTPATIENT)
Dept: OBGYN | Facility: CLINIC | Age: 30
End: 2020-09-05

## 2020-09-05 DIAGNOSIS — E11.9 TYPE 2 DIABETES MELLITUS WITHOUT COMPLICATION, WITH LONG-TERM CURRENT USE OF INSULIN (H): ICD-10-CM

## 2020-09-05 DIAGNOSIS — Z79.4 TYPE 2 DIABETES MELLITUS WITHOUT COMPLICATION, WITH LONG-TERM CURRENT USE OF INSULIN (H): ICD-10-CM

## 2020-09-05 DIAGNOSIS — Z34.02 SUPERVISION OF NORMAL FIRST PREGNANCY IN SECOND TRIMESTER: ICD-10-CM

## 2020-09-08 ENCOUNTER — HOSPITAL ENCOUNTER (OUTPATIENT)
Dept: ULTRASOUND IMAGING | Facility: CLINIC | Age: 30
End: 2020-09-08
Attending: OBSTETRICS & GYNECOLOGY
Payer: COMMERCIAL

## 2020-09-08 ENCOUNTER — OFFICE VISIT (OUTPATIENT)
Dept: MATERNAL FETAL MEDICINE | Facility: CLINIC | Age: 30
End: 2020-09-08
Attending: OBSTETRICS & GYNECOLOGY
Payer: COMMERCIAL

## 2020-09-08 DIAGNOSIS — O24.119 TYPE 2 DIABETES MELLITUS IN PREGNANCY, UNSPECIFIED TRIMESTER: ICD-10-CM

## 2020-09-08 DIAGNOSIS — O24.113 TYPE 2 DIABETES MELLITUS COMPLICATING PREGNANCY, ANTEPARTUM, THIRD TRIMESTER: Primary | ICD-10-CM

## 2020-09-08 PROCEDURE — 76819 FETAL BIOPHYS PROFIL W/O NST: CPT

## 2020-09-11 ENCOUNTER — HOSPITAL ENCOUNTER (OUTPATIENT)
Dept: ULTRASOUND IMAGING | Facility: CLINIC | Age: 30
End: 2020-09-11
Attending: OBSTETRICS & GYNECOLOGY
Payer: COMMERCIAL

## 2020-09-11 ENCOUNTER — OFFICE VISIT (OUTPATIENT)
Dept: MATERNAL FETAL MEDICINE | Facility: CLINIC | Age: 30
End: 2020-09-11
Attending: OBSTETRICS & GYNECOLOGY
Payer: COMMERCIAL

## 2020-09-11 DIAGNOSIS — O24.119 TYPE 2 DIABETES MELLITUS IN PREGNANCY, UNSPECIFIED TRIMESTER: ICD-10-CM

## 2020-09-11 DIAGNOSIS — O24.113 TYPE 2 DIABETES MELLITUS COMPLICATING PREGNANCY, ANTEPARTUM, THIRD TRIMESTER: Primary | ICD-10-CM

## 2020-09-11 PROCEDURE — 76819 FETAL BIOPHYS PROFIL W/O NST: CPT

## 2020-09-15 ENCOUNTER — OFFICE VISIT (OUTPATIENT)
Dept: MATERNAL FETAL MEDICINE | Facility: CLINIC | Age: 30
End: 2020-09-15
Attending: OBSTETRICS & GYNECOLOGY
Payer: COMMERCIAL

## 2020-09-15 ENCOUNTER — HOSPITAL ENCOUNTER (OUTPATIENT)
Dept: ULTRASOUND IMAGING | Facility: CLINIC | Age: 30
End: 2020-09-15
Attending: OBSTETRICS & GYNECOLOGY
Payer: COMMERCIAL

## 2020-09-15 DIAGNOSIS — O24.119 TYPE 2 DIABETES MELLITUS IN PREGNANCY, UNSPECIFIED TRIMESTER: ICD-10-CM

## 2020-09-15 DIAGNOSIS — O24.113 TYPE 2 DIABETES MELLITUS COMPLICATING PREGNANCY, ANTEPARTUM, THIRD TRIMESTER: Primary | ICD-10-CM

## 2020-09-15 PROCEDURE — 76819 FETAL BIOPHYS PROFIL W/O NST: CPT | Performed by: OBSTETRICS & GYNECOLOGY

## 2020-09-15 PROCEDURE — 76816 OB US FOLLOW-UP PER FETUS: CPT

## 2020-09-15 NOTE — PROGRESS NOTES
The patient was seen for an ultrasound in the [facility] at the [facility] today.  For a detailed report of the ultrasound examination, please see the ultrasound report which can be found under the imaging tab.      Stephany Whitfield MD  Maternal-Fetal Medicine

## 2020-09-18 ENCOUNTER — OFFICE VISIT (OUTPATIENT)
Dept: MATERNAL FETAL MEDICINE | Facility: CLINIC | Age: 30
End: 2020-09-18
Attending: OBSTETRICS & GYNECOLOGY
Payer: COMMERCIAL

## 2020-09-18 ENCOUNTER — PRENATAL OFFICE VISIT (OUTPATIENT)
Dept: OBGYN | Facility: CLINIC | Age: 30
End: 2020-09-18
Payer: COMMERCIAL

## 2020-09-18 ENCOUNTER — HOSPITAL ENCOUNTER (OUTPATIENT)
Dept: ULTRASOUND IMAGING | Facility: CLINIC | Age: 30
End: 2020-09-18
Attending: OBSTETRICS & GYNECOLOGY
Payer: COMMERCIAL

## 2020-09-18 VITALS
DIASTOLIC BLOOD PRESSURE: 67 MMHG | SYSTOLIC BLOOD PRESSURE: 100 MMHG | HEART RATE: 108 BPM | BODY MASS INDEX: 28.32 KG/M2 | WEIGHT: 145 LBS | TEMPERATURE: 97.9 F

## 2020-09-18 DIAGNOSIS — O24.119 TYPE 2 DIABETES MELLITUS IN PREGNANCY, UNSPECIFIED TRIMESTER: ICD-10-CM

## 2020-09-18 DIAGNOSIS — O09.90 SUPERVISION OF HIGH RISK PREGNANCY, ANTEPARTUM: Primary | ICD-10-CM

## 2020-09-18 DIAGNOSIS — O24.113 TYPE 2 DIABETES MELLITUS COMPLICATING PREGNANCY, ANTEPARTUM, THIRD TRIMESTER: Primary | ICD-10-CM

## 2020-09-18 DIAGNOSIS — Z23 NEED FOR PROPHYLACTIC VACCINATION AND INOCULATION AGAINST INFLUENZA: ICD-10-CM

## 2020-09-18 PROCEDURE — 90686 IIV4 VACC NO PRSV 0.5 ML IM: CPT | Performed by: OBSTETRICS & GYNECOLOGY

## 2020-09-18 PROCEDURE — 99207 ZZC PRENATAL VISIT: CPT | Performed by: OBSTETRICS & GYNECOLOGY

## 2020-09-18 PROCEDURE — 90471 IMMUNIZATION ADMIN: CPT | Performed by: OBSTETRICS & GYNECOLOGY

## 2020-09-18 PROCEDURE — 76819 FETAL BIOPHYS PROFIL W/O NST: CPT

## 2020-09-18 NOTE — PROGRESS NOTES
Breech on [facility] ultrasound 9/15, feels breech today, briefly discussed options of version vs scheduled C/S if remains breech.  Has BPP after this visit, continues to follow with diabetic ed (reports glucose levels 228-46).  Flu shot today. RTC 2 weeks.  AM

## 2020-09-22 ENCOUNTER — OFFICE VISIT (OUTPATIENT)
Dept: MATERNAL FETAL MEDICINE | Facility: CLINIC | Age: 30
End: 2020-09-22
Attending: OBSTETRICS & GYNECOLOGY
Payer: COMMERCIAL

## 2020-09-22 ENCOUNTER — HOSPITAL ENCOUNTER (OUTPATIENT)
Dept: ULTRASOUND IMAGING | Facility: CLINIC | Age: 30
End: 2020-09-22
Attending: OBSTETRICS & GYNECOLOGY
Payer: COMMERCIAL

## 2020-09-22 DIAGNOSIS — O24.113 TYPE 2 DIABETES MELLITUS COMPLICATING PREGNANCY, ANTEPARTUM, THIRD TRIMESTER: Primary | ICD-10-CM

## 2020-09-22 DIAGNOSIS — O24.119 TYPE 2 DIABETES MELLITUS IN PREGNANCY, UNSPECIFIED TRIMESTER: ICD-10-CM

## 2020-09-22 PROCEDURE — 76819 FETAL BIOPHYS PROFIL W/O NST: CPT

## 2020-09-23 NOTE — PROGRESS NOTES
"Timothy Crowell is a 30 year old female who is being evaluated via a billable telephone visit.      The patient has been notified of following:     \"This telephone visit will be conducted via a call between you and your physician/provider. We have found that certain health care needs can be provided without the need for a physical exam.  This service lets us provide the care you need with a short phone conversation.  If a prescription is necessary we can send it directly to your pharmacy.  If lab work is needed we can place an order for that and you can then stop by our lab to have the test done at a later time.    Telephone visits are billed at different rates depending on your insurance coverage. During this emergency period, for some insurers they may be billed the same as an in-person visit.  Please reach out to your insurance provider with any questions.    If during the course of the call the physician/provider feels a telephone visit is not appropriate, you will not be charged for this service.\"    Patient has given verbal consent for Telephone visit?  Yes    What phone number would you like to be contacted at? [phone]    How would you like to obtain your AVS? Elfego Lomas MA    Patients Glucose Data was Sent via Email      Due to the COVID 19 pandemic this visit was converted to a telephone visit in order to help prevent spread of infection in this patient and the general population.    Time of start: 10:30  am  Time of end: 10:42 am  Total duration of telephone visit: 12 minutes.    JIMMY Crowell is a 30 year old female with gestational diabetes. Telephone visit today for diabetes follow up.   No previous hx of diabetes.  Pt was seen by Dr. Castro in our clinic on 4/8/2020.  I have been monitoring her blood sugars on a regular basis.  This is Timothy's first pregnancy and she is currently [id number] pregnant.  Pt is being seen by OB at [place].  For her diabetes, she is currently " "taking NPH insulin 8 units subcutaneous at bedtime.  She has Novolog for meal coverage if she plans to have increase carbs for a meal.  Her FBS values have been in the 77-98  range.  Her 1 hr postmeal blood sugars have been < 140 for most meals. She has required some Novolog with lunch.  Overall, her blood sugar values have been in target.   Pt's A1C was 5.0 % on 7/22/2020. She is anemic.  On ROS today,she feels the baby moving frequently.  She reports feeling less \" sad\". She denies any thoughts of self harm.    She denies headaches, n/v, SOB at rest, cough, fever, chills or chest pain.  No abd pain or diarrhea. No dysuria or hematuria.  Patient denies vaginal spotting or bleeding.    ROS  Please see under HPI.    Allergies  Allergies   Allergen Reactions     Seasonal Allergies        Medications  Current Outpatient Medications   Medication Sig Dispense Refill     aspirin (ASA) 81 MG EC tablet Take 1 tablet (81 mg) by mouth daily 100 tablet 1     Blood Glucose Monitoring Suppl (ONETOUCH VERIO FLEX SYSTEM) w/Device KIT 1 each 4 times daily 1 kit 0     Continuous Blood Gluc  (FREESTYLE LEONELA READER) LIMA 1 Device daily 1 Device 0     Continuous Blood Gluc Sensor (FREESTYLE LEONELA 14 DAY SENSOR) MISC Change every 14 days. Check fasting blood sugar and 1 hr postmeal blood sugar DAILY. 8 each 3     insulin NPH (NOVOLIN N VIAL) 100 UNIT/ML vial INJECT 6 UNITS SUBCUTANEOUS AT AT BEDTIME 10 mL 11     insulin pen needle (32G X 4 MM) 32G X 4 MM miscellaneous Use 4 pen needles daily or as directed. 200 each 4     insulin pen needle (BD CARINA U/F) 32G X 4 MM miscellaneous Use 1 daily as directed. 100 each 1     insulin syringe-needle U-100 (31G X 5/16\" 0.3 ML) 31G X 5/16\" 0.3 ML miscellaneous Use 1 syringes daily or as directed. 100 each 1     insulin syringe-needle U-100 (BD INSULIN SYRINGE ULTRAFINE) 31G X 5/16\" 0.3 ML miscellaneous Use daily. 100 each 3     Lancets (ONETOUCH DELICA PLUS [id number]) MISC 100 each by In " Vitro route 4 times daily 100 each prn     NOVOLOG FLEXPEN 100 UNIT/ML soln 1-3 units before lunch as directed.  Increase as directed. 15 mL 0     ONETOUCH VERIO IQ test strip Use to test blood sugar 4 times daily or as directed.  Ok to substitute alternative if insurance prefers. 200 strip 3     Phenylephrine HCl (AFRIN ALLERGY NA)        Prenatal Vit-Fe Fumarate-FA (PRENATAL VITAMINS PO)          Family History  family history includes Cerebrovascular Disease in her father; Hyperlipidemia in her sister; Liver Cancer in her mother.    Social History   reports that she has never smoked. She has never used smokeless tobacco. She reports previous alcohol use. She reports that she does not use drugs.     .  No children.  Not working at this time during COVID19 pandemic.    Past Medical History  None.    Physical Exam    No exam today.    RESULTS  Creatinine   Date Value Ref Range Status   06/02/2020 0.47 (L) 0.52 - 1.04 mg/dL Final     GFR Estimate   Date Value Ref Range Status   06/02/2020 >90 >60 mL/min/[1.73_m2] Final     Comment:     Non  GFR Calc  Starting 12/18/2018, serum creatinine based estimated GFR (eGFR) will be   calculated using the Chronic Kidney Disease Epidemiology Collaboration   (CKD-EPI) equation.       Hemoglobin A1C   Date Value Ref Range Status   07/22/2020 5.0 0 - 5.6 % Final     Comment:     Normal <5.7% Prediabetes 5.7-6.4%  Diabetes 6.5% or higher - adopted from ADA   consensus guidelines.       ALT   Date Value Ref Range Status   06/02/2020 16 0 - 50 U/L Final     AST   Date Value Ref Range Status   06/02/2020 11 0 - 45 U/L Final     TSH   Date Value Ref Range Status   06/25/2020 1.51 0.40 - 4.00 mU/L Final       ASSESSMENT/PLAN:    1.  GESTATIONAL DIABETES: Continue NPH 8 units subcutaneous at bedtime.  Pt has Novolog to use if needed for meal coverage.  She is aware that we would like to see her FBS 95 or < and her 1 hr postmeal blood sugar 140 or <.  Her BP was  "100/67 on 9/18/2020.   Her TSH was normal on 6/25/2020.    2.  PREGNANCY: Pt is 50mc9hbt pregnant and followed by OB at [place].    3.  DEPRESSION:  She reports feeling less \"sad\". She denies thoughts of self harm or suicide.    3.  FOLLOW UP: with me on 10/8/2020.  Pt has my contact number to call if she has any questions or if her blood sugar values are above target.  I also asked her to use SPO if she has any questions.  A1C ordered.                                              "

## 2020-09-24 ENCOUNTER — VIRTUAL VISIT (OUTPATIENT)
Dept: ENDOCRINOLOGY | Facility: CLINIC | Age: 30
End: 2020-09-24
Payer: COMMERCIAL

## 2020-09-24 DIAGNOSIS — O24.414 GESTATIONAL DIABETES REQUIRING INSULIN: Primary | ICD-10-CM

## 2020-09-24 NOTE — LETTER
"9/24/2020       RE: Timothy Crowell  [address]  [facility] [address]     Dear Colleague,    Thank you for referring your patient, Timothy Crowell, to the [facility] ENDOCRINOLOGY at [facility]. Please see a copy of my visit note below.    Timothy Crowell is a 30 year old female who is being evaluated via a billable telephone visit.      The patient has been notified of following:     \"This telephone visit will be conducted via a call between you and your physician/provider. We have found that certain health care needs can be provided without the need for a physical exam.  This service lets us provide the care you need with a short phone conversation.  If a prescription is necessary we can send it directly to your pharmacy.  If lab work is needed we can place an order for that and you can then stop by our lab to have the test done at a later time.    Telephone visits are billed at different rates depending on your insurance coverage. During this emergency period, for some insurers they may be billed the same as an in-person visit.  Please reach out to your insurance provider with any questions.    If during the course of the call the physician/provider feels a telephone visit is not appropriate, you will not be charged for this service.\"    Patient has given verbal consent for Telephone visit?  Yes    What phone number would you like to be contacted at? [phone]    How would you like to obtain your AVS? Elfego Lomas MA    Patients Glucose Data was Sent via Email      Due to the COVID 19 pandemic this visit was converted to a telephone visit in order to help prevent spread of infection in this patient and the general population.    Time of start: 10:30  am  Time of end: 10:42 am  Total duration of telephone visit: 12 minutes.    HPI  Timothy Crowell is a 30 year old female with gestational diabetes. Telephone visit today for " "diabetes follow up.   No previous hx of diabetes.  Pt was seen by Dr. Castro in our clinic on 4/8/2020.  I have been monitoring her blood sugars on a regular basis.  This is Timothy's first pregnancy and she is currently [id number] pregnant.  Pt is being seen by OB at [place].  For her diabetes, she is currently taking NPH insulin 8 units subcutaneous at bedtime.  She has Novolog for meal coverage if she plans to have increase carbs for a meal.  Her FBS values have been in the 77-98  range.  Her 1 hr postmeal blood sugars have been < 140 for most meals. She has required some Novolog with lunch.  Overall, her blood sugar values have been in target.   Pt's A1C was 5.0 % on 7/22/2020. She is anemic.  On ROS today,she feels the baby moving frequently.  She reports feeling less \" sad\". She denies any thoughts of self harm.    She denies headaches, n/v, SOB at rest, cough, fever, chills or chest pain.  No abd pain or diarrhea. No dysuria or hematuria.  Patient denies vaginal spotting or bleeding.    ROS  Please see under HPI.    Allergies  Allergies   Allergen Reactions     Seasonal Allergies        Medications  Current Outpatient Medications   Medication Sig Dispense Refill     aspirin (ASA) 81 MG EC tablet Take 1 tablet (81 mg) by mouth daily 100 tablet 1     Blood Glucose Monitoring Suppl (ONETOUCH VERIO FLEX SYSTEM) w/Device KIT 1 each 4 times daily 1 kit 0     Continuous Blood Gluc  (FREESTYLE LEONELA READER) LIMA 1 Device daily 1 Device 0     Continuous Blood Gluc Sensor (FREESTYLE LEONELA 14 DAY SENSOR) MISC Change every 14 days. Check fasting blood sugar and 1 hr postmeal blood sugar DAILY. 8 each 3     insulin NPH (NOVOLIN N VIAL) 100 UNIT/ML vial INJECT 6 UNITS SUBCUTANEOUS AT AT BEDTIME 10 mL 11     insulin pen needle (32G X 4 MM) 32G X 4 MM miscellaneous Use 4 pen needles daily or as directed. 200 each 4     insulin pen needle (BD CARINA U/F) 32G X 4 MM miscellaneous Use 1 daily as directed. 100 each 1     " "insulin syringe-needle U-100 (31G X 5/16\" 0.3 ML) 31G X 5/16\" 0.3 ML miscellaneous Use 1 syringes daily or as directed. 100 each 1     insulin syringe-needle U-100 (BD INSULIN SYRINGE ULTRAFINE) 31G X 5/16\" 0.3 ML miscellaneous Use daily. 100 each 3     Lancets (ONETOUCH DELICA PLUS [id number]) MISC 100 each by In Vitro route 4 times daily 100 each prn     NOVOLOG FLEXPEN 100 UNIT/ML soln 1-3 units before lunch as directed.  Increase as directed. 15 mL 0     ONETOUCH VERIO IQ test strip Use to test blood sugar 4 times daily or as directed.  Ok to substitute alternative if insurance prefers. 200 strip 3     Phenylephrine HCl (AFRIN ALLERGY NA)        Prenatal Vit-Fe Fumarate-FA (PRENATAL VITAMINS PO)          Family History  family history includes Cerebrovascular Disease in her father; Hyperlipidemia in her sister; Liver Cancer in her mother.    Social History   reports that she has never smoked. She has never used smokeless tobacco. She reports previous alcohol use. She reports that she does not use drugs.     .  No children.  Not working at this time during COVID19 pandemic.    Past Medical History  None.    Physical Exam    No exam today.    RESULTS  Creatinine   Date Value Ref Range Status   06/02/2020 0.47 (L) 0.52 - 1.04 mg/dL Final     GFR Estimate   Date Value Ref Range Status   06/02/2020 >90 >60 mL/min/[1.73_m2] Final     Comment:     Non  GFR Calc  Starting 12/18/2018, serum creatinine based estimated GFR (eGFR) will be   calculated using the Chronic Kidney Disease Epidemiology Collaboration   (CKD-EPI) equation.       Hemoglobin A1C   Date Value Ref Range Status   07/22/2020 5.0 0 - 5.6 % Final     Comment:     Normal <5.7% Prediabetes 5.7-6.4%  Diabetes 6.5% or higher - adopted from ADA   consensus guidelines.       ALT   Date Value Ref Range Status   06/02/2020 16 0 - 50 U/L Final     AST   Date Value Ref Range Status   06/02/2020 11 0 - 45 U/L Final     TSH   Date Value Ref Range " "Status   06/25/2020 1.51 0.40 - 4.00 mU/L Final       ASSESSMENT/PLAN:    1.  GESTATIONAL DIABETES: Continue NPH 8 units subcutaneous at bedtime.  Pt has Novolog to use if needed for meal coverage.  She is aware that we would like to see her FBS 95 or < and her 1 hr postmeal blood sugar 140 or <.  Her BP was 100/67 on 9/18/2020.   Her TSH was normal on 6/25/2020.    2.  PREGNANCY: Pt is 06zo2mkm pregnant and followed by OB at [place].    3.  DEPRESSION:  She reports feeling less \"sad\". She denies thoughts of self harm or suicide.    3.  FOLLOW UP: with me on 10/8/2020.  Pt has my contact number to call if she has any questions or if her blood sugar values are above target.  I also asked her to use Syndero if she has any questions.  A1C ordered.    Again, thank you for allowing me to participate in the care of your patient.      Sincerely,    Alma Salamanca PA-C  "

## 2020-09-25 ENCOUNTER — OFFICE VISIT (OUTPATIENT)
Dept: MATERNAL FETAL MEDICINE | Facility: CLINIC | Age: 30
End: 2020-09-25
Attending: OBSTETRICS & GYNECOLOGY
Payer: COMMERCIAL

## 2020-09-25 ENCOUNTER — HOSPITAL ENCOUNTER (OUTPATIENT)
Dept: ULTRASOUND IMAGING | Facility: CLINIC | Age: 30
End: 2020-09-25
Attending: OBSTETRICS & GYNECOLOGY
Payer: COMMERCIAL

## 2020-09-25 DIAGNOSIS — O24.113 TYPE 2 DIABETES MELLITUS COMPLICATING PREGNANCY, ANTEPARTUM, THIRD TRIMESTER: ICD-10-CM

## 2020-09-25 DIAGNOSIS — O24.113 TYPE 2 DIABETES MELLITUS COMPLICATING PREGNANCY, ANTEPARTUM, THIRD TRIMESTER: Primary | ICD-10-CM

## 2020-09-25 PROCEDURE — 76819 FETAL BIOPHYS PROFIL W/O NST: CPT

## 2020-09-25 NOTE — PROGRESS NOTES
The patient was seen for an ultrasound in the [facility] at the [facility] today.  For a detailed report of the ultrasound examination, please see the ultrasound report which can be found under the imaging tab.      Stephany Whitfield MD  Maternal-Fetal Medicine

## 2020-09-29 ENCOUNTER — HOSPITAL ENCOUNTER (OUTPATIENT)
Dept: ULTRASOUND IMAGING | Facility: CLINIC | Age: 30
End: 2020-09-29
Attending: OBSTETRICS & GYNECOLOGY
Payer: COMMERCIAL

## 2020-09-29 ENCOUNTER — OFFICE VISIT (OUTPATIENT)
Dept: MATERNAL FETAL MEDICINE | Facility: CLINIC | Age: 30
End: 2020-09-29
Attending: OBSTETRICS & GYNECOLOGY
Payer: COMMERCIAL

## 2020-09-29 DIAGNOSIS — O24.113 TYPE 2 DIABETES MELLITUS COMPLICATING PREGNANCY, ANTEPARTUM, THIRD TRIMESTER: Primary | ICD-10-CM

## 2020-09-29 DIAGNOSIS — O24.113 TYPE 2 DIABETES MELLITUS COMPLICATING PREGNANCY, ANTEPARTUM, THIRD TRIMESTER: ICD-10-CM

## 2020-09-29 PROCEDURE — 76819 FETAL BIOPHYS PROFIL W/O NST: CPT

## 2020-10-01 NOTE — PROGRESS NOTES
36w4d  Doing well since last visit.  No signs of labor.  GBS and hgb today.  Breech by BSUS.  Breech presentation and version or cesearan section was discussed.  Risks of version discussed including risk of fetal distress, risk of emergancy c/s, placental abruption, SROM, failure of version and reverting to breech following procedure.  ACOG brochure given.  Also discussed option of ECV under spinal.  Patient left clinic with plans to discuss further with her  (on the phone for the visit) and then decided she'd like to proceed with ECV under spinal.  Scheduled for 10/5 with Dr. Lancaster.  COVID testing obtained today.  RTC weekly until delivery.  Isabella Huerta MD

## 2020-10-02 ENCOUNTER — APPOINTMENT (OUTPATIENT)
Dept: LAB | Facility: CLINIC | Age: 30
End: 2020-10-02
Attending: OBSTETRICS & GYNECOLOGY
Payer: COMMERCIAL

## 2020-10-02 ENCOUNTER — OFFICE VISIT (OUTPATIENT)
Dept: MATERNAL FETAL MEDICINE | Facility: CLINIC | Age: 30
End: 2020-10-02
Attending: OBSTETRICS & GYNECOLOGY
Payer: COMMERCIAL

## 2020-10-02 ENCOUNTER — TELEPHONE (OUTPATIENT)
Dept: OBGYN | Facility: CLINIC | Age: 30
End: 2020-10-02

## 2020-10-02 ENCOUNTER — PRENATAL OFFICE VISIT (OUTPATIENT)
Dept: OBGYN | Facility: CLINIC | Age: 30
End: 2020-10-02
Payer: COMMERCIAL

## 2020-10-02 ENCOUNTER — HOSPITAL ENCOUNTER (OUTPATIENT)
Dept: ULTRASOUND IMAGING | Facility: CLINIC | Age: 30
End: 2020-10-02
Attending: OBSTETRICS & GYNECOLOGY
Payer: COMMERCIAL

## 2020-10-02 VITALS
BODY MASS INDEX: 28.83 KG/M2 | WEIGHT: 147.6 LBS | HEART RATE: 93 BPM | TEMPERATURE: 97.9 F | DIASTOLIC BLOOD PRESSURE: 66 MMHG | SYSTOLIC BLOOD PRESSURE: 105 MMHG

## 2020-10-02 DIAGNOSIS — Z11.59 SCREENING FOR VIRAL DISEASE: ICD-10-CM

## 2020-10-02 DIAGNOSIS — O24.113 TYPE 2 DIABETES MELLITUS COMPLICATING PREGNANCY, ANTEPARTUM, THIRD TRIMESTER: ICD-10-CM

## 2020-10-02 DIAGNOSIS — O24.113 TYPE 2 DIABETES MELLITUS COMPLICATING PREGNANCY, ANTEPARTUM, THIRD TRIMESTER: Primary | ICD-10-CM

## 2020-10-02 DIAGNOSIS — O09.90 SUPERVISION OF HIGH RISK PREGNANCY, ANTEPARTUM: Primary | ICD-10-CM

## 2020-10-02 LAB — HGB BLD-MCNC: 11.6 G/DL (ref 11.7–15.7)

## 2020-10-02 PROCEDURE — 76819 FETAL BIOPHYS PROFIL W/O NST: CPT

## 2020-10-02 PROCEDURE — 99207 PR NO CHARGE LOS: CPT | Performed by: OBSTETRICS & GYNECOLOGY

## 2020-10-02 PROCEDURE — 99N1025 PR STATISTIC OBHBG - HEMOGLOBIN: Performed by: OBSTETRICS & GYNECOLOGY

## 2020-10-02 PROCEDURE — 76819 FETAL BIOPHYS PROFIL W/O NST: CPT | Mod: 26 | Performed by: OBSTETRICS & GYNECOLOGY

## 2020-10-02 PROCEDURE — 87653 STREP B DNA AMP PROBE: CPT | Performed by: OBSTETRICS & GYNECOLOGY

## 2020-10-02 PROCEDURE — 99207 PR PRENATAL VISIT: CPT | Performed by: OBSTETRICS & GYNECOLOGY

## 2020-10-02 RX ORDER — PRENATAL VIT/IRON FUM/FOLIC AC 27MG-0.8MG
1 TABLET ORAL DAILY
Qty: 90 TABLET | Refills: 3 | Status: SHIPPED | OUTPATIENT
Start: 2020-10-02 | End: 2020-10-29

## 2020-10-02 NOTE — TELEPHONE ENCOUNTER
Sharon,    Could you please schedule this patient for ECV on Tuesday, 10/6 with Dr. Lancaster.  Patient would like spinal.  COVID test done in clinic today.    Thanks,  Isabella

## 2020-10-02 NOTE — TELEPHONE ENCOUNTER
Patient would like to schedule a version. Was told to speak with triage. Interested in having spinal anesthesia during the version

## 2020-10-02 NOTE — TELEPHONE ENCOUNTER
ECV with epidural scheduled for 10/6 at 1100a. Patient aware and also sent a [employer] message, per request.  Sharon Love, Surgery Coordinator

## 2020-10-03 LAB
GP B STREP DNA SPEC QL NAA+PROBE: NEGATIVE
SPECIMEN SOURCE: NORMAL

## 2020-10-06 ENCOUNTER — ANESTHESIA EVENT (OUTPATIENT)
Dept: OBGYN | Facility: CLINIC | Age: 30
End: 2020-10-06
Payer: COMMERCIAL

## 2020-10-06 ENCOUNTER — HOSPITAL ENCOUNTER (OUTPATIENT)
Facility: CLINIC | Age: 30
Discharge: HOME | End: 2020-10-06
Attending: OBSTETRICS & GYNECOLOGY | Admitting: OBSTETRICS & GYNECOLOGY
Payer: COMMERCIAL

## 2020-10-06 ENCOUNTER — ANESTHESIA (OUTPATIENT)
Dept: OBGYN | Facility: CLINIC | Age: 30
End: 2020-10-06
Payer: COMMERCIAL

## 2020-10-06 VITALS
RESPIRATION RATE: 18 BRPM | BODY MASS INDEX: 31.25 KG/M2 | WEIGHT: 160 LBS | OXYGEN SATURATION: 98 % | TEMPERATURE: 98.2 F | DIASTOLIC BLOOD PRESSURE: 54 MMHG | SYSTOLIC BLOOD PRESSURE: 99 MMHG

## 2020-10-06 LAB
ABO + RH BLD: NORMAL
ABO + RH BLD: NORMAL
BLD GP AB SCN SERPL QL: NORMAL
BLOOD BANK CMNT PATIENT-IMP: NORMAL
ERYTHROCYTE [DISTWIDTH] IN BLOOD BY AUTOMATED COUNT: 14.2 % (ref 10–15)
GLUCOSE BLDC GLUCOMTR-MCNC: 107 MG/DL (ref 70–99)
GLUCOSE BLDC GLUCOMTR-MCNC: 69 MG/DL (ref 70–99)
HCT VFR BLD AUTO: 34.8 % (ref 35–47)
HGB BLD-MCNC: 11.6 G/DL (ref 11.7–15.7)
MCH RBC QN AUTO: 30.7 PG (ref 26.5–33)
MCHC RBC AUTO-ENTMCNC: 33.3 G/DL (ref 31.5–36.5)
MCV RBC AUTO: 92 FL (ref 78–100)
PLATELET # BLD AUTO: 211 10E9/L (ref 150–450)
RBC # BLD AUTO: 3.78 10E12/L (ref 3.8–5.2)
SPECIMEN EXP DATE BLD: NORMAL
WBC # BLD AUTO: 10.2 10E9/L (ref 4–11)

## 2020-10-06 PROCEDURE — 258N000001 HC RX 258

## 2020-10-06 PROCEDURE — 360N000011 HC SURGERY LEVEL 1 EA 15 ADDTL MIN - UMMC: Performed by: OBSTETRICS & GYNECOLOGY

## 2020-10-06 PROCEDURE — 86901 BLOOD TYPING SEROLOGIC RH(D): CPT | Performed by: OBSTETRICS & GYNECOLOGY

## 2020-10-06 PROCEDURE — 999N001017 HC STATISTIC GLUCOSE BY METER IP

## 2020-10-06 PROCEDURE — 258N000003 HC RX IP 258 OP 636: Performed by: OBSTETRICS & GYNECOLOGY

## 2020-10-06 PROCEDURE — 59025 FETAL NON-STRESS TEST: CPT

## 2020-10-06 PROCEDURE — 59412 ANTEPARTUM MANIPULATION: CPT | Mod: GC | Performed by: OBSTETRICS & GYNECOLOGY

## 2020-10-06 PROCEDURE — 96372 THER/PROPH/DIAG INJ SC/IM: CPT | Performed by: OBSTETRICS & GYNECOLOGY

## 2020-10-06 PROCEDURE — 999N000139 HC STATISTIC PRE-PROCEDURE ASSESSMENT II: Performed by: OBSTETRICS & GYNECOLOGY

## 2020-10-06 PROCEDURE — 59412 ANTEPARTUM MANIPULATION: CPT

## 2020-10-06 PROCEDURE — 250N000011 HC RX IP 250 OP 636: Performed by: STUDENT IN AN ORGANIZED HEALTH CARE EDUCATION/TRAINING PROGRAM

## 2020-10-06 PROCEDURE — 59025 FETAL NON-STRESS TEST: CPT | Mod: 26 | Performed by: OBSTETRICS & GYNECOLOGY

## 2020-10-06 PROCEDURE — 250N000011 HC RX IP 250 OP 636: Performed by: OBSTETRICS & GYNECOLOGY

## 2020-10-06 PROCEDURE — 258N000003 HC RX IP 258 OP 636: Performed by: STUDENT IN AN ORGANIZED HEALTH CARE EDUCATION/TRAINING PROGRAM

## 2020-10-06 PROCEDURE — 85027 COMPLETE CBC AUTOMATED: CPT | Performed by: OBSTETRICS & GYNECOLOGY

## 2020-10-06 PROCEDURE — 86850 RBC ANTIBODY SCREEN: CPT | Performed by: OBSTETRICS & GYNECOLOGY

## 2020-10-06 PROCEDURE — 370N000002 HC ANESTHESIA TECHNICAL FEE, EACH ADDTL 15 MIN: Performed by: OBSTETRICS & GYNECOLOGY

## 2020-10-06 PROCEDURE — 360N000010 HC SURGERY LEVEL 1 1ST 30 MIN - UMMC: Performed by: OBSTETRICS & GYNECOLOGY

## 2020-10-06 PROCEDURE — 96360 HYDRATION IV INFUSION INIT: CPT

## 2020-10-06 PROCEDURE — 96361 HYDRATE IV INFUSION ADD-ON: CPT

## 2020-10-06 PROCEDURE — 59025 FETAL NON-STRESS TEST: CPT | Mod: 76

## 2020-10-06 PROCEDURE — 86900 BLOOD TYPING SEROLOGIC ABO: CPT | Performed by: OBSTETRICS & GYNECOLOGY

## 2020-10-06 PROCEDURE — 250N000013 HC RX MED GY IP 250 OP 250 PS 637: Performed by: OBSTETRICS & GYNECOLOGY

## 2020-10-06 PROCEDURE — 370N000001 HC ANESTHESIA TECHNICAL FEE, 1ST 30 MIN: Performed by: OBSTETRICS & GYNECOLOGY

## 2020-10-06 RX ORDER — SODIUM CHLORIDE, SODIUM LACTATE, POTASSIUM CHLORIDE, CALCIUM CHLORIDE 600; 310; 30; 20 MG/100ML; MG/100ML; MG/100ML; MG/100ML
INJECTION, SOLUTION INTRAVENOUS CONTINUOUS
Status: DISCONTINUED | OUTPATIENT
Start: 2020-10-06 | End: 2020-10-06 | Stop reason: HOSPADM

## 2020-10-06 RX ORDER — DEXTROSE MONOHYDRATE 25 G/50ML
25-50 INJECTION, SOLUTION INTRAVENOUS
Status: DISCONTINUED | OUTPATIENT
Start: 2020-10-06 | End: 2020-10-06 | Stop reason: HOSPADM

## 2020-10-06 RX ORDER — TERBUTALINE SULFATE 1 MG/ML
0.25 INJECTION, SOLUTION SUBCUTANEOUS ONCE
Status: COMPLETED | OUTPATIENT
Start: 2020-10-06 | End: 2020-10-06

## 2020-10-06 RX ORDER — ONDANSETRON 2 MG/ML
4 INJECTION INTRAMUSCULAR; INTRAVENOUS EVERY 30 MIN PRN
Status: DISCONTINUED | OUTPATIENT
Start: 2020-10-06 | End: 2020-10-06 | Stop reason: HOSPADM

## 2020-10-06 RX ORDER — SODIUM CHLORIDE 9 MG/ML
INJECTION, SOLUTION INTRAVENOUS CONTINUOUS
Status: DISCONTINUED | OUTPATIENT
Start: 2020-10-06 | End: 2020-10-06 | Stop reason: HOSPADM

## 2020-10-06 RX ORDER — DEXTROSE MONOHYDRATE 25 G/50ML
INJECTION, SOLUTION INTRAVENOUS
Status: DISCONTINUED
Start: 2020-10-06 | End: 2020-10-06 | Stop reason: HOSPADM

## 2020-10-06 RX ORDER — CITRIC ACID/SODIUM CITRATE 334-500MG
30 SOLUTION, ORAL ORAL ONCE
Status: COMPLETED | OUTPATIENT
Start: 2020-10-06 | End: 2020-10-06

## 2020-10-06 RX ORDER — SODIUM CHLORIDE, SODIUM LACTATE, POTASSIUM CHLORIDE, CALCIUM CHLORIDE 600; 310; 30; 20 MG/100ML; MG/100ML; MG/100ML; MG/100ML
INJECTION, SOLUTION INTRAVENOUS CONTINUOUS PRN
Status: DISCONTINUED | OUTPATIENT
Start: 2020-10-06 | End: 2020-10-06

## 2020-10-06 RX ORDER — CHLOROPROCAINE HYDROCHLORIDE 30 MG/ML
INJECTION, SOLUTION EPIDURAL; INFILTRATION; INTRACAUDAL; PERINEURAL PRN
Status: DISCONTINUED | OUTPATIENT
Start: 2020-10-06 | End: 2020-10-06

## 2020-10-06 RX ORDER — FENTANYL CITRATE-0.9 % NACL/PF 10 MCG/ML
PLASTIC BAG, INJECTION (ML) INTRAVENOUS CONTINUOUS PRN
Status: DISCONTINUED | OUTPATIENT
Start: 2020-10-06 | End: 2020-10-06

## 2020-10-06 RX ORDER — NALOXONE HYDROCHLORIDE 0.4 MG/ML
.1-.4 INJECTION, SOLUTION INTRAMUSCULAR; INTRAVENOUS; SUBCUTANEOUS
Status: DISCONTINUED | OUTPATIENT
Start: 2020-10-06 | End: 2020-10-06 | Stop reason: HOSPADM

## 2020-10-06 RX ORDER — ONDANSETRON 4 MG/1
4 TABLET, ORALLY DISINTEGRATING ORAL EVERY 30 MIN PRN
Status: DISCONTINUED | OUTPATIENT
Start: 2020-10-06 | End: 2020-10-06 | Stop reason: HOSPADM

## 2020-10-06 RX ORDER — LIDOCAINE 40 MG/G
CREAM TOPICAL
Status: DISCONTINUED | OUTPATIENT
Start: 2020-10-06 | End: 2020-10-06 | Stop reason: HOSPADM

## 2020-10-06 RX ORDER — NICOTINE POLACRILEX 4 MG
15-30 LOZENGE BUCCAL
Status: DISCONTINUED | OUTPATIENT
Start: 2020-10-06 | End: 2020-10-06 | Stop reason: HOSPADM

## 2020-10-06 RX ORDER — FENTANYL CITRATE 50 UG/ML
25-50 INJECTION, SOLUTION INTRAMUSCULAR; INTRAVENOUS
Status: DISCONTINUED | OUTPATIENT
Start: 2020-10-06 | End: 2020-10-06 | Stop reason: HOSPADM

## 2020-10-06 RX ADMIN — DEXTROSE MONOHYDRATE 25 ML: 25 INJECTION, SOLUTION INTRAVENOUS at 13:10

## 2020-10-06 RX ADMIN — SODIUM CHLORIDE, POTASSIUM CHLORIDE, SODIUM LACTATE AND CALCIUM CHLORIDE: 600; 310; 30; 20 INJECTION, SOLUTION INTRAVENOUS at 13:46

## 2020-10-06 RX ADMIN — SODIUM CITRATE AND CITRIC ACID MONOHYDRATE 30 ML: 500; 334 SOLUTION ORAL at 13:06

## 2020-10-06 RX ADMIN — CHLOROPROCAINE HYDROCHLORIDE 1.5 ML: 30 INJECTION, SOLUTION EPIDURAL; INFILTRATION; INTRACAUDAL; PERINEURAL at 13:56

## 2020-10-06 RX ADMIN — SODIUM CHLORIDE, POTASSIUM CHLORIDE, SODIUM LACTATE AND CALCIUM CHLORIDE: 600; 310; 30; 20 INJECTION, SOLUTION INTRAVENOUS at 12:36

## 2020-10-06 RX ADMIN — TERBUTALINE SULFATE 0.25 MG: 1 INJECTION SUBCUTANEOUS at 13:45

## 2020-10-06 RX ADMIN — Medication 50 MCG/MIN: at 13:56

## 2020-10-06 ASSESSMENT — ACTIVITIES OF DAILY LIVING (ADL)
TOILETING_ISSUES: NO
FALL_HISTORY_WITHIN_LAST_SIX_MONTHS: NO

## 2020-10-06 NOTE — ANESTHESIA PREPROCEDURE EVALUATION
Anesthesia Pre-Procedure Evaluation    Patient: Timothy Crowell   MRN:     [id number] Gender:   female   Age:    30 year old :      1990        Preoperative Diagnosis: * No pre-op diagnosis entered *   * No procedures listed *     LABS:  CBC:   Lab Results   Component Value Date    WBC 10.2 10/06/2020    WBC 9.7 2020    HGB 11.6 (L) 10/06/2020    HGB 11.6 (L) 10/02/2020    HCT 34.8 (L) 10/06/2020    HCT 38.0 2020     10/06/2020     2020     BMP:   Lab Results   Component Value Date    CR 0.47 (L) 2020     COAGS: No results found for: PTT, INR, FIBR  POC:   Lab Results   Component Value Date    BGM 69 (L) 10/06/2020    HCG Positive (A) 2020     OTHER:   Lab Results   Component Value Date    A1C 5.0 2020    ALT 16 2020    AST 11 2020    TSH 1.51 2020        Preop Vitals    BP Readings from Last 3 Encounters:   10/06/20 107/62   10/02/20 105/66   20 100/67    Pulse Readings from Last 3 Encounters:   10/02/20 93   20 108   20 88      Resp Readings from Last 3 Encounters:   20 16    SpO2 Readings from Last 3 Encounters:   20 100%   20 100%   20 97%      Temp Readings from Last 1 Encounters:   10/06/20 36.7  C (98  F) (Oral)    Ht Readings from Last 1 Encounters:   20 1.524 m (5')      Wt Readings from Last 1 Encounters:   10/06/20 72.6 kg (160 lb)    Estimated body mass index is 31.25 kg/m  as calculated from the following:    Height as of 3/11/20: 1.524 m (5').    Weight as of this encounter: 72.6 kg (160 lb).     LDA:  Peripheral IV 10/06/20 Right Lower forearm (Active)   Number of days: 0       Intrathecal/Epidural Catheter 10/06/20 (Active)   Number of days: 0        Past Medical History:   Diagnosis Date     Diabetes (H)     gestational      Past Surgical History:   Procedure Laterality Date     APPENDECTOMY        Allergies   Allergen Reactions     Seasonal Allergies          Anesthesia Evaluation       history and physical reviewed . Pt has had prior anesthetic. Type: General    No history of anesthetic complications          ROS/MED HX    ENT/Pulmonary:  - neg pulmonary ROS     Neurologic:  - neg neurologic ROS     Cardiovascular:  - neg cardiovascular ROS       METS/Exercise Tolerance:  >4 METS   Hematologic:  - neg hematologic  ROS       Musculoskeletal:  - neg musculoskeletal ROS       GI/Hepatic:     (+) GERD       Renal/Genitourinary:  - ROS Renal section negative       Endo: Comment: GDM    (+) type II DM Using insulin .      Psychiatric:  - neg psychiatric ROS       Infectious Disease:  - neg infectious disease ROS       Malignancy:      - no malignancy   Other:                         PHYSICAL EXAM:   Mental Status/Neuro: A/A/O   Airway: Facies: Feasible  Mallampati: II  Mouth/Opening: Full  TM distance: > 6 cm  Neck ROM: Full   Respiratory: Auscultation: CTAB     Resp. Rate: Normal     Resp. Effort: Normal      CV: Rhythm: Regular  Rate: Age appropriate  Heart: Normal Sounds  Edema: None   Comments:      Dental: Normal Dentition                Assessment:   ASA SCORE: 2    H&P: History and physical reviewed and following examination; no interval change.   Smoking Status:  Non-Smoker/Unknown   NPO Status: NPO Appropriate     Plan:   Anes. Type:  MAC; Spinal; Peripheral Nerve Block; For Post-op pain in coordination with surgeon     Block Details: TAP; Exparel; Single Shot   Pre-Medication: None   Induction:  N/a   Airway: Native Airway   Access/Monitoring: PIV   Maintenance: N/a     Postop Plan:   Postop Pain: Regional; NSAID  Postop Sedation/Airway: Not planned  Disposition: Inpatient/Admit     PONV Management:   Adult Risk Factors: Female, Non-Smoker   Prevention: Ondansetron, Dexamethasone     CONSENT: Direct conversation (Patient declined )   Plan and risks discussed with: Patient   Blood Products: Consented (ALL Blood Products)       Comments for  Plan/Consent:  Discussed risks of anesthesia including nausea, vomiting, headache, itching, bleeding, infection, cardiopulmonary complications, neurologic complications, and serious complications.               (+) gestational diabetes           Silvestre Lancaster MD

## 2020-10-06 NOTE — ANESTHESIA PREPROCEDURE EVALUATION
Anesthesia Pre-Procedure Evaluation    Patient: Timothy Crowell   MRN:     [id number] Gender:   female   Age:    30 year old :      1990        Preoperative Diagnosis: Breech presentation, no version [O32.1XX0]   Procedure(s):  EXAM UNDER ANESTHESIA, PELVIS     LABS:  CBC:   Lab Results   Component Value Date    WBC 10.2 10/06/2020    WBC 9.7 2020    HGB 11.6 (L) 10/06/2020    HGB 11.6 (L) 10/02/2020    HCT 34.8 (L) 10/06/2020    HCT 38.0 2020     10/06/2020     2020     BMP:   Lab Results   Component Value Date    CR 0.47 (L) 2020     COAGS: No results found for: PTT, INR, FIBR  POC:   Lab Results   Component Value Date     (H) 10/06/2020    HCG Positive (A) 2020     OTHER:   Lab Results   Component Value Date    A1C 5.0 2020    ALT 16 2020    AST 11 2020    TSH 1.51 2020        Preop Vitals    BP Readings from Last 3 Encounters:   10/06/20 99/54   10/02/20 105/66   20 100/67    Pulse Readings from Last 3 Encounters:   10/02/20 93   20 108   20 88      Resp Readings from Last 3 Encounters:   10/06/20 18   20 16    SpO2 Readings from Last 3 Encounters:   10/06/20 98%   20 100%   20 100%      Temp Readings from Last 1 Encounters:   10/06/20 36.8  C (98.2  F) (Oral)    Ht Readings from Last 1 Encounters:   20 1.524 m (5')      Wt Readings from Last 1 Encounters:   10/06/20 72.6 kg (160 lb)    Estimated body mass index is 31.25 kg/m  as calculated from the following:    Height as of 3/11/20: 1.524 m (5').    Weight as of this encounter: 72.6 kg (160 lb).     LDA:  Peripheral IV 10/06/20 Right Lower forearm (Active)   Site Assessment WDL 10/06/20 1435   Line Status Infusing 10/06/20 1435   Phlebitis Scale 0-->no symptoms 10/06/20 143   Infiltration Scale 0 10/06/20 1435   Number of days: 0        Past Medical History:   Diagnosis Date     Diabetes (H)     gestational      Past Surgical  History:   Procedure Laterality Date     APPENDECTOMY  2015      Allergies   Allergen Reactions     Seasonal Allergies         Anesthesia Evaluation     .             ROS/MED HX    ENT/Pulmonary:  - neg pulmonary ROS     Neurologic:  - neg neurologic ROS     Cardiovascular:  - neg cardiovascular ROS       METS/Exercise Tolerance:     Hematologic:  - neg hematologic  ROS       Musculoskeletal:  - neg musculoskeletal ROS       GI/Hepatic:  - neg GI/hepatic ROS       Renal/Genitourinary:  - ROS Renal section negative       Endo:     (+) type II DM .      Psychiatric:  - neg psychiatric ROS       Infectious Disease:  - neg infectious disease ROS       Malignancy:      - no malignancy   Other: Comment: IUP at 37 weeks EGA, breech presentation   (+) Possibly pregnant                        PHYSICAL EXAM:   Mental Status/Neuro: A/A/O   Airway: Facies: Feasible  Mallampati: II  Mouth/Opening: Full  TM distance: > 6 cm  Neck ROM: Full   Respiratory: Auscultation: CTAB     Resp. Rate: Normal     Resp. Effort: Normal      CV: Rhythm: Regular  Rate: Age appropriate  Heart: Normal Sounds  Edema: None   Comments:      Dental: Normal Dentition                Assessment:   ASA SCORE: 2    H&P: History and physical reviewed and following examination; no interval change.   Smoking Status:  Non-Smoker/Unknown   NPO Status: ELEVATED Aspiration Risk/Unknown     Plan:   Anes. Type:  Spinal   Pre-Medication: None   Induction:  N/a   Airway: Native Airway   Access/Monitoring: PIV   Maintenance: N/a     Postop Plan:   Postop Pain: None  Postop Sedation/Airway: Not planned  Disposition: Outpatient     PONV Management:   Adult Risk Factors: Female, Non-Smoker     CONSENT: Direct conversation   Plan and risks discussed with: Patient   Blood Products: Consented (ALL Blood Products)       Comments for Plan/Consent:  Pt with breech presentation for ECV with anesthesia. Plan spinal for ECV, then use if C/S needed emergently. Plan to discharge home  after procedure for IOL or C/S at 39 weeks.                 Amanda Noyola MD

## 2020-10-06 NOTE — PROGRESS NOTES
External Cephalic Version Procedure Note    Procedure:  The surgical team was alerted of the procedure; team and operating room were available.  The patient emptied her bladder. The procedure, alternatives, and risks were discussed with the patient.  Alternatives included expectant management and scheduled  delivery.  Risks discussed included but were not limited to patient discomfort, placental abruption, rupture of membranes, fetal intolerance of procedure requiring emergent or urgent delivery.  Questions were answered and a consent form was signed. The patient wished to proceed with external cephalic version.    BSUS revealed fetal position being breech, head maternal right with back up to maternal right.  Placenta was posterior. Cardiac activity and fetal movement were visible.  NST was reactive.  Baseline 140, moderate variability, + accelerations, - decelerations.  Tocometry notable for 0-2 contractions per 10 mins.     An IV was in place. Terbutaline 0.25mg was administered subcutaneously. The external cephalic version was attempted 4x by elevating the breech and applying downward pressure at the posterior aspect of the fetal vertex to achieve a forward roll.  Between attempts, fetal heart rate returned to 130s via BSUS. All attempts were unsuccessful in achieving version. The patient tolerated the procedure well.     The fetus was monitored for 2 hour following the procedure with a reactive and reassuring tracing. Baseline 140, moderate variability, + accels, - decels. Tocometry with 1-2 contractions in 10 mins.  She was discharged home with follow-up. She was instructed to observe for abdominal pain, vaginal bleeding, rupture of membranes, increasing or frequent painful contractions (q5min), and decreased fetal movement.    Dr. Lancaster present for the procedure.    Ranulfo Huang MD  OB/GYN PGY-2  10/06/20 4:43 PM      Physician Attestation   I was present for the entire procedure.    Mya Bell  Anisha  Date of Service (when I saw the patient): 10/06/20

## 2020-10-06 NOTE — ANESTHESIA POSTPROCEDURE EVALUATION
Anesthesia POST Procedure Evaluation    Patient: Timothy Crowell   MRN:     [id number] Gender:   female   Age:    30 year old :      1990        Preoperative Diagnosis: Breech presentation, no version [O32.1XX0]   Procedure(s):  EXAM UNDER ANESTHESIA, PELVIS   Postop Comments: No value filed.     Anesthesia Type: No value filed.       Disposition: Outpatient   Postop Pain Control: Uneventful            Sign Out: Well controlled pain   PONV: No   Neuro/Psych: Uneventful            Sign Out: Acceptable/Baseline neuro status   Airway/Respiratory: Uneventful            Sign Out: Acceptable/Baseline resp. status   CV/Hemodynamics: Uneventful            Sign Out: Acceptable CV status   Other NRE: NONE   DID A NON-ROUTINE EVENT OCCUR? No         Last Anesthesia Record Vitals:  CRNA VITALS  10/6/2020 1358 - 10/6/2020 1458      10/6/2020             NIBP:  106/59    Pulse:  114    NIBP Mean:  71    Ht Rate:  112    SpO2:  99 %          Last PACU Vitals:  Vitals Value Taken Time   /59 10/06/20 1522   Temp 36.8  C (98.2  F) 10/06/20 1450   Pulse     Resp 18 10/06/20 1450   SpO2 99 % 10/06/20 1525   Temp src     NIBP     Pulse     SpO2     Resp     Temp     Ht Rate     Temp 2     Vitals shown include unvalidated device data.      Electronically Signed By: Amanda Noyola MD, 2020, 3:26 PM

## 2020-10-06 NOTE — ANESTHESIA PROCEDURE NOTES
Spinal/LP Procedure Note    Spinal Block      Staff -   Anesthesiologist:  Amanda Noyola MD  Resident/Fellow: Silvestre Lancaster MD  Performed By: resident  Location: OB  Procedure Start/Stop Times:      10/6/2020 1:55 PM    patient identified, IV checked, risks and benefits discussed, informed consent, monitors and equipment checked, pre-op evaluation, at physician/surgeon's request and post-op pain management      Correct Patient: Yes      Correct Position: Yes      Correct Site: Yes      Correct Procedure: Yes      Correct Laterality:  Yes    Site Marked:  Yes and No  Procedure:     Procedure:  Intrathecal    ASA:  2    Position:  Sitting    Sterile Prep: chloraprep      Insertion site:  L4-5    Approach:  Midline    Needle Type:  Suman    Needle gauge (G):  25    Local Skin Infiltration:  2% lidocaine    amount (ml):  3    Introducer used: Yes      Introducer gauge:  20 G    Attempts:  1    Redirects:  0    CSF:  Clear    Paresthesias:  No  Assessment/Narrative:      Patient tolerated well.

## 2020-10-06 NOTE — H&P
[facility]  OB History and Physical    Name: Timothy Crowell MRN#: [id number]   Age: 30 year old YOB: 1990      HPI:   Timothy Crowell is a 30 year old  at 37w1d by 7w2d US, who presents with external cephalic version for fetal malpresentation.     Notes normal fetal movement. Denies headache, vision changes, chest pain, SOB, epigastric/RUQ pain, acute worsening in bilateral lower extremities swelling. Denies cvaginal bleeding or gush of fluid like rupture of membrane.         ROS:   Complete 10-point ROS negative except as noted in HPI.    Pregnancy Complications:  - T2DM, on NPH 8 units HS    Prenatal Labs:   T&S: AB positive, antibody neg  Hgb: 11.6 on 10/2/2020  Plt: 261 on 3/17/2020  GT: failed on 3/30/2020 at 10w0d - 175  GTT: failed on 3/31/2020 at 10w1d - 247/199/113/100  GBS: negative 10/2/2020  Rubella: immune on 3/17/2020  HIV/HepB/RPR: neg on 3/17/2020  AST: 11 on 2020  ALT: 16 on 2020  Cr: 0.47 on 2020  UPC: < 0.05 on 2020    Ultrasounds  9/15/2020 at 34w1d: Placenta Posterior. MVP - 6.8 cm. EFW 46%ile at 5lb4oz.         Past Medical History:   T2DM. Denied personal history of T2DM, cHTN, or asthma.       Past Surgical History:   Appendectomy.        Social History:   Denied smoking, alcohol use, or recreational drug use.        Family History:   Denied family history of bleeding disorder, blood clots, or fetal congenital abnormalities.  Family History   Problem Relation Age of Onset     Liver Cancer Mother      Cerebrovascular Disease Father      Hyperlipidemia Sister           Immunizations:     Immunization History   Administered Date(s) Administered     Influenza Vaccine IM > 6 months Valent IIV4 2020     TDAP Vaccine (Adacel) 2020          Allergies:     Allergies   Allergen Reactions     Seasonal Allergies            Medications:     Medication Sig     aspirin (ASA) 81 MG EC tablet Take 1 tablet (81 mg) by mouth  daily     insulin NPH (NOVOLIN N VIAL) 100 UNIT/ML vial INJECT 8 UNITS SUBCUTANEOUS AT AT BEDTIME     Prenatal Vit-Fe Fumarate-FA (PRENATAL MULTIVITAMIN W/IRON) 27-0.8 MG tablet Take 1 tablet by mouth daily     PE:  Vit:   Patient Vitals for the past 4 hrs:   BP Temp Temp src   10/06/20 1124 107/62 -- --   10/06/20 1122 107/62 98.1  F (36.7  C) Oral      Gen: Well-appearing, NAD, comfortable   CV: rrr, no mrg   Pulm: Ctab, no wheezes or crackles   Abd: Soft, gravid, non-tender  Ext: trace LE edema b/l    Pres:  Breech with head on maternal RUQ + spinal alone maternal right side by BSUS  EFW:  6lb by Leopold's    NST:  FHT: Baseline 140, moderate variability, accelerations present, no decelerations   Buhler: 1-2 contractions in 10 minutes           Labs/Imagings:   CBC, T&S     Assessment and Plan:   Timothy Crowell is a 30 year old  at 37w1d by 7w2d US, who presents with external cephalic version for fetal malpresentation.     Scheduled external cephalic version  - Will give terbutaline 0.25 mg subcutaneously 15 to 30 minutes prior to the procedure for uterine relaxation   - Labs: T&S, CBC, give epidural anesthesia  - Placenta: anterior  - Anesthesia: none  - Diet: NPO  - The team will consent for ECV and CS prior to the procedure.    Suzi Archer MD ([email or web address])  N OBGYN PGY-3  OB G2 pager: [phone]  OB G3 pager: [phone]  10/06/2020         Physician Attestation   I, Mya Lancaster MD, personally examined and evaluated this patient.  I discussed the patient with the resident/fellow and care team, and agree with the assessment and plan of care as documented in the note of 10/06/20.      I personally reviewed vital signs, medications, labs and ultrasound..    Key findings: NST reactive.   Still breech.   Consent held, proceed with ECV.   Mya Lancaster MD  Date of Service (when I saw the patient): 10/06/20

## 2020-10-07 NOTE — PROGRESS NOTES
"Timothy Crowell is a 30 year old female who is being evaluated via a billable telephone visit.      The patient has been notified of following:     \"This telephone visit will be conducted via a call between you and your physician/provider. We have found that certain health care needs can be provided without the need for a physical exam.  This service lets us provide the care you need with a short phone conversation.  If a prescription is necessary we can send it directly to your pharmacy.  If lab work is needed we can place an order for that and you can then stop by our lab to have the test done at a later time.    Telephone visits are billed at different rates depending on your insurance coverage. During this emergency period, for some insurers they may be billed the same as an in-person visit.  Please reach out to your insurance provider with any questions.    If during the course of the call the physician/provider feels a telephone visit is not appropriate, you will not be charged for this service.\"    Patient has given verbal consent for Telephone visit?  Yes    What phone number would you like to be contacted at? [phone]    How would you like to obtain your AVS? Elfego Lomas MA    Outcome for 10/07/20 11:25 AM :Glucose sent via Email      Due to the COVID 19 pandemic this visit was converted to a telephone visit in order to help prevent spread of infection in this patient and the general population.    Time of start: 10:00 am  Time of end: 10:13 am  Total duration of telephone visit: 13 minutes.    HPI  Timothy Crowell is a 30 year old female with gestational diabetes. Telephone visit today for diabetes follow up.   No previous hx of diabetes.  Pt was seen by Dr. Castro in our clinic on 4/8/2020.  I have been monitoring her blood sugars on a regular basis.  This is Timothy's first pregnancy and she is currently 31ku0ghm pregnant.  Pt is being seen by OB at [place].  The baby is breech and " "she will be schedule for a  around 10/19/2020 per patient.  For her diabetes, she is currently taking NPH insulin 8 units subcutaneous at bedtime.  She has Novolog for meal coverage if she plans to have increase carbs for a meal.  Her FBS values have been in the 83-95 range lately.  Her 1 hr postmeal blood sugars have been < 140 for most meals. She has required some Novolog with lunch.  Her Freestyle Celia sensor showed an average glucose of 102 and an estimated A1C value of 5.7 %. This was data from -10/7/2020.  Pt's A1C was 5.0 % on 2020. She is anemic.  On ROS today,she feels the baby moving frequently.  She is in good spirits today.    She denies headaches, n/v, SOB at rest, cough, fever, chills or chest pain.  No abd pain or diarrhea. No dysuria or hematuria.  Patient denies vaginal spotting or bleeding.    ROS  Please see under HPI.    Allergies  Allergies   Allergen Reactions     Seasonal Allergies        Medications  Current Outpatient Medications   Medication Sig Dispense Refill     aspirin (ASA) 81 MG EC tablet Take 1 tablet (81 mg) by mouth daily 100 tablet 1     Blood Glucose Monitoring Suppl (ONETOUCH VERIO FLEX SYSTEM) w/Device KIT 1 each 4 times daily 1 kit 0     Continuous Blood Gluc  (FREESTYLE CELIA READER) LIMA 1 Device daily 1 Device 0     Continuous Blood Gluc Sensor (FREESTYLE CELIA 14 DAY SENSOR) MISC Change every 14 days. Check fasting blood sugar and 1 hr postmeal blood sugar DAILY. 8 each 3     insulin NPH (NOVOLIN N VIAL) 100 UNIT/ML vial INJECT 6 UNITS SUBCUTANEOUS AT AT BEDTIME (Patient taking differently: 8 Units INJECT 6 UNITS SUBCUTANEOUS AT AT BEDTIME) 10 mL 11     insulin pen needle (32G X 4 MM) 32G X 4 MM miscellaneous Use 4 pen needles daily or as directed. 200 each 4     insulin pen needle (BD CARINA U/F) 32G X 4 MM miscellaneous Use 1 daily as directed. 100 each 1     insulin syringe-needle U-100 (31G X 5/16\" 0.3 ML) 31G X 5/16\" 0.3 ML miscellaneous Use 1 " "syringes daily or as directed. 100 each 1     insulin syringe-needle U-100 (BD INSULIN SYRINGE ULTRAFINE) 31G X 5/16\" 0.3 ML miscellaneous Use daily. 100 each 3     Lancets (ONETOUCH DELICA PLUS [id number]) MISC 100 each by In Vitro route 4 times daily 100 each prn     NOVOLOG FLEXPEN 100 UNIT/ML soln 1-3 units before lunch as directed.  Increase as directed. 15 mL 0     ONETOUCH VERIO IQ test strip Use to test blood sugar 4 times daily or as directed.  Ok to substitute alternative if insurance prefers. 200 strip 3     Phenylephrine HCl (AFRIN ALLERGY NA)        Prenatal Vit-Fe Fumarate-FA (PRENATAL MULTIVITAMIN W/IRON) 27-0.8 MG tablet Take 1 tablet by mouth daily 90 tablet 3     Prenatal Vit-Fe Fumarate-FA (PRENATAL VITAMINS PO)          Family History  family history includes Cerebrovascular Disease in her father; Hyperlipidemia in her sister; Liver Cancer in her mother.    Social History   reports that she has never smoked. She has never used smokeless tobacco. She reports previous alcohol use. She reports that she does not use drugs.     .  No children.  Not working at this time during COVID19 pandemic.    Past Medical History  None.    Physical Exam    No exam today.    RESULTS  Creatinine   Date Value Ref Range Status   06/02/2020 0.47 (L) 0.52 - 1.04 mg/dL Final     GFR Estimate   Date Value Ref Range Status   06/02/2020 >90 >60 mL/min/[1.73_m2] Final     Comment:     Non  GFR Calc  Starting 12/18/2018, serum creatinine based estimated GFR (eGFR) will be   calculated using the Chronic Kidney Disease Epidemiology Collaboration   (CKD-EPI) equation.       Hemoglobin A1C   Date Value Ref Range Status   07/22/2020 5.0 0 - 5.6 % Final     Comment:     Normal <5.7% Prediabetes 5.7-6.4%  Diabetes 6.5% or higher - adopted from ADA   consensus guidelines.       ALT   Date Value Ref Range Status   06/02/2020 16 0 - 50 U/L Final     AST   Date Value Ref Range Status   06/02/2020 11 0 - 45 U/L " "Final     TSH   Date Value Ref Range Status   2020 1.51 0.40 - 4.00 mU/L Final       ASSESSMENT/PLAN:    1.  GESTATIONAL DIABETES: Continue NPH 8 units subcutaneous at bedtime.  Pt has Novolog to use if needed for meal coverage.  She is aware that we would like to see her FBS 95 or < and her 1 hr postmeal blood sugar 140 or <.  Her BP was 107/62 on 10/6/2020.   Her TSH was normal on 2020.    2.  PREGNANCY: Pt is [id number] pregnant and followed by OB at [place].  Pt states she will have a  on 10/19/2020.    3.  DEPRESSION:  She reports feeling less \"sad\". She denies thoughts of self harm or suicide.  Good spirits today.    3.  FOLLOW UP: with me on 10/15/2020.  Pt has my contact number to call if she has any questions or if her blood sugar values are above target.  I also asked her to use [employer] if she has any questions.  A1C ordered.                                              "

## 2020-10-08 ENCOUNTER — VIRTUAL VISIT (OUTPATIENT)
Dept: ENDOCRINOLOGY | Facility: CLINIC | Age: 30
End: 2020-10-08
Payer: COMMERCIAL

## 2020-10-08 DIAGNOSIS — O24.414 GESTATIONAL DIABETES REQUIRING INSULIN: Primary | ICD-10-CM

## 2020-10-08 PROCEDURE — 99213 OFFICE O/P EST LOW 20 MIN: CPT | Mod: 95 | Performed by: PHYSICIAN ASSISTANT

## 2020-10-08 NOTE — LETTER
"10/8/2020       RE: Timothy Crowell  [address]  [place] [address]     Dear Colleague,    Thank you for referring your patient, Timothy Crowell, to the [facility] [place] at [facility]. Please see a copy of my visit note below.    Timothy Crowell is a 30 year old female who is being evaluated via a billable telephone visit.      The patient has been notified of following:     \"This telephone visit will be conducted via a call between you and your physician/provider. We have found that certain health care needs can be provided without the need for a physical exam.  This service lets us provide the care you need with a short phone conversation.  If a prescription is necessary we can send it directly to your pharmacy.  If lab work is needed we can place an order for that and you can then stop by our lab to have the test done at a later time.    Telephone visits are billed at different rates depending on your insurance coverage. During this emergency period, for some insurers they may be billed the same as an in-person visit.  Please reach out to your insurance provider with any questions.    If during the course of the call the physician/provider feels a telephone visit is not appropriate, you will not be charged for this service.\"    Patient has given verbal consent for Telephone visit?  Yes    What phone number would you like to be contacted at? [phone]    How would you like to obtain your AVS? Elfego Lomas MA    Outcome for 10/07/20 11:25 AM :Glucose sent via Email      Due to the COVID 19 pandemic this visit was converted to a telephone visit in order to help prevent spread of infection in this patient and the general population.    Time of start: 10:00 am  Time of end: 10:13 am  Total duration of telephone visit: 13 minutes.    HPI  Timothy Crowell is a 30 year old female with gestational " diabetes. Telephone visit today for diabetes follow up.   No previous hx of diabetes.  Pt was seen by Dr. Castro in our clinic on 2020.  I have been monitoring her blood sugars on a regular basis.  This is Timothy's first pregnancy and she is currently [id number] pregnant.  Pt is being seen by OB at [place].  The baby is breech and she will be schedule for a  around 10/19/2020 per patient.  For her diabetes, she is currently taking NPH insulin 8 units subcutaneous at bedtime.  She has Novolog for meal coverage if she plans to have increase carbs for a meal.  Her FBS values have been in the 83-95 range lately.  Her 1 hr postmeal blood sugars have been < 140 for most meals. She has required some Novolog with lunch.  Her Freestyle Celia sensor showed an average glucose of 102 and an estimated A1C value of 5.7 %. This was data from -10/7/2020.  Pt's A1C was 5.0 % on 2020. She is anemic.  On ROS today,she feels the baby moving frequently.  She is in good spirits today.    She denies headaches, n/v, SOB at rest, cough, fever, chills or chest pain.  No abd pain or diarrhea. No dysuria or hematuria.  Patient denies vaginal spotting or bleeding.    ROS  Please see under HPI.    Allergies  Allergies   Allergen Reactions     Seasonal Allergies        Medications  Current Outpatient Medications   Medication Sig Dispense Refill     aspirin (ASA) 81 MG EC tablet Take 1 tablet (81 mg) by mouth daily 100 tablet 1     Blood Glucose Monitoring Suppl (ONETOUCH VERIO FLEX SYSTEM) w/Device KIT 1 each 4 times daily 1 kit 0     Continuous Blood Gluc  (FREESTYLE CELIA READER) LIMA 1 Device daily 1 Device 0     Continuous Blood Gluc Sensor (FREESTYLE CELIA 14 DAY SENSOR) MISC Change every 14 days. Check fasting blood sugar and 1 hr postmeal blood sugar DAILY. 8 each 3     insulin NPH (NOVOLIN N VIAL) 100 UNIT/ML vial INJECT 6 UNITS SUBCUTANEOUS AT AT BEDTIME (Patient taking differently: 8 Units INJECT 6 UNITS  "SUBCUTANEOUS AT AT BEDTIME) 10 mL 11     insulin pen needle (32G X 4 MM) 32G X 4 MM miscellaneous Use 4 pen needles daily or as directed. 200 each 4     insulin pen needle (BD CARINA U/F) 32G X 4 MM miscellaneous Use 1 daily as directed. 100 each 1     insulin syringe-needle U-100 (31G X 5/16\" 0.3 ML) 31G X 5/16\" 0.3 ML miscellaneous Use 1 syringes daily or as directed. 100 each 1     insulin syringe-needle U-100 (BD INSULIN SYRINGE ULTRAFINE) 31G X 5/16\" 0.3 ML miscellaneous Use daily. 100 each 3     Lancets (ONETOUCH DELICA PLUS [id number]) MISC 100 each by In Vitro route 4 times daily 100 each prn     NOVOLOG FLEXPEN 100 UNIT/ML soln 1-3 units before lunch as directed.  Increase as directed. 15 mL 0     ONETOUCH VERIO IQ test strip Use to test blood sugar 4 times daily or as directed.  Ok to substitute alternative if insurance prefers. 200 strip 3     Phenylephrine HCl (AFRIN ALLERGY NA)        Prenatal Vit-Fe Fumarate-FA (PRENATAL MULTIVITAMIN W/IRON) 27-0.8 MG tablet Take 1 tablet by mouth daily 90 tablet 3     Prenatal Vit-Fe Fumarate-FA (PRENATAL VITAMINS PO)          Family History  family history includes Cerebrovascular Disease in her father; Hyperlipidemia in her sister; Liver Cancer in her mother.    Social History   reports that she has never smoked. She has never used smokeless tobacco. She reports previous alcohol use. She reports that she does not use drugs.     .  No children.  Not working at this time during COVID19 pandemic.    Past Medical History  None.    Physical Exam    No exam today.    RESULTS  Creatinine   Date Value Ref Range Status   06/02/2020 0.47 (L) 0.52 - 1.04 mg/dL Final     GFR Estimate   Date Value Ref Range Status   06/02/2020 >90 >60 mL/min/[1.73_m2] Final     Comment:     Non  GFR Calc  Starting 12/18/2018, serum creatinine based estimated GFR (eGFR) will be   calculated using the Chronic Kidney Disease Epidemiology Collaboration   (CKD-EPI) equation.   " "    Hemoglobin A1C   Date Value Ref Range Status   2020 5.0 0 - 5.6 % Final     Comment:     Normal <5.7% Prediabetes 5.7-6.4%  Diabetes 6.5% or higher - adopted from ADA   consensus guidelines.       ALT   Date Value Ref Range Status   2020 16 0 - 50 U/L Final     AST   Date Value Ref Range Status   2020 11 0 - 45 U/L Final     TSH   Date Value Ref Range Status   2020 1.51 0.40 - 4.00 mU/L Final       ASSESSMENT/PLAN:    1.  GESTATIONAL DIABETES: Continue NPH 8 units subcutaneous at bedtime.  Pt has Novolog to use if needed for meal coverage.  She is aware that we would like to see her FBS 95 or < and her 1 hr postmeal blood sugar 140 or <.  Her BP was 107/62 on 10/6/2020.   Her TSH was normal on 2020.    2.  PREGNANCY: Pt is 82dl8iux pregnant and followed by OB at [place].  Pt states she will have a  on 10/19/2020.    3.  DEPRESSION:  She reports feeling less \"sad\". She denies thoughts of self harm or suicide.  Good spirits today.    3.  FOLLOW UP: with me on 10/15/2020.  Pt has my contact number to call if she has any questions or if her blood sugar values are above target.  I also asked her to use [employer] if she has any questions.  A1C ordered.      Again, thank you for allowing me to participate in the care of your patient.      Sincerely,    Alma Salamanca PA-C      "

## 2020-10-09 ENCOUNTER — OFFICE VISIT (OUTPATIENT)
Dept: MATERNAL FETAL MEDICINE | Facility: CLINIC | Age: 30
End: 2020-10-09
Attending: OBSTETRICS & GYNECOLOGY
Payer: COMMERCIAL

## 2020-10-09 ENCOUNTER — HOSPITAL ENCOUNTER (OUTPATIENT)
Dept: ULTRASOUND IMAGING | Facility: CLINIC | Age: 30
End: 2020-10-09
Attending: OBSTETRICS & GYNECOLOGY
Payer: COMMERCIAL

## 2020-10-09 DIAGNOSIS — O24.113 TYPE 2 DIABETES MELLITUS COMPLICATING PREGNANCY, ANTEPARTUM, THIRD TRIMESTER: ICD-10-CM

## 2020-10-09 DIAGNOSIS — O24.113 TYPE 2 DIABETES MELLITUS COMPLICATING PREGNANCY, ANTEPARTUM, THIRD TRIMESTER: Primary | ICD-10-CM

## 2020-10-09 PROCEDURE — 76819 FETAL BIOPHYS PROFIL W/O NST: CPT

## 2020-10-09 PROCEDURE — 99207 PR NO CHARGE LOS: CPT | Performed by: OBSTETRICS & GYNECOLOGY

## 2020-10-09 PROCEDURE — 76819 FETAL BIOPHYS PROFIL W/O NST: CPT | Mod: 26 | Performed by: OBSTETRICS & GYNECOLOGY

## 2020-10-09 NOTE — PROGRESS NOTES
The patient was seen for an ultrasound in the [facility] at the [facility] today.  For a detailed report of the ultrasound examination, please see the ultrasound report which can be found under the imaging tab.      Stephany Whitfield MD  Maternal-Fetal Medicine

## 2020-10-13 ENCOUNTER — OFFICE VISIT (OUTPATIENT)
Dept: MATERNAL FETAL MEDICINE | Facility: CLINIC | Age: 30
End: 2020-10-13
Attending: OBSTETRICS & GYNECOLOGY
Payer: COMMERCIAL

## 2020-10-13 ENCOUNTER — HOSPITAL ENCOUNTER (OUTPATIENT)
Dept: ULTRASOUND IMAGING | Facility: CLINIC | Age: 30
End: 2020-10-13
Attending: OBSTETRICS & GYNECOLOGY
Payer: COMMERCIAL

## 2020-10-13 ENCOUNTER — PRENATAL OFFICE VISIT (OUTPATIENT)
Dept: OBGYN | Facility: CLINIC | Age: 30
End: 2020-10-13
Payer: COMMERCIAL

## 2020-10-13 VITALS
SYSTOLIC BLOOD PRESSURE: 104 MMHG | BODY MASS INDEX: 28.9 KG/M2 | DIASTOLIC BLOOD PRESSURE: 65 MMHG | HEART RATE: 93 BPM | WEIGHT: 148 LBS | TEMPERATURE: 97.8 F

## 2020-10-13 DIAGNOSIS — O24.113 TYPE 2 DIABETES MELLITUS COMPLICATING PREGNANCY, ANTEPARTUM, THIRD TRIMESTER: Primary | ICD-10-CM

## 2020-10-13 DIAGNOSIS — Z11.59 ENCOUNTER FOR SCREENING FOR OTHER VIRAL DISEASES: Primary | ICD-10-CM

## 2020-10-13 DIAGNOSIS — O09.90 SUPERVISION OF HIGH RISK PREGNANCY, ANTEPARTUM: ICD-10-CM

## 2020-10-13 DIAGNOSIS — O24.113 TYPE 2 DIABETES MELLITUS COMPLICATING PREGNANCY, ANTEPARTUM, THIRD TRIMESTER: ICD-10-CM

## 2020-10-13 PROCEDURE — 76819 FETAL BIOPHYS PROFIL W/O NST: CPT | Performed by: OBSTETRICS & GYNECOLOGY

## 2020-10-13 PROCEDURE — 76816 OB US FOLLOW-UP PER FETUS: CPT | Mod: 26 | Performed by: OBSTETRICS & GYNECOLOGY

## 2020-10-13 PROCEDURE — 76819 FETAL BIOPHYS PROFIL W/O NST: CPT | Mod: 26 | Performed by: OBSTETRICS & GYNECOLOGY

## 2020-10-13 PROCEDURE — 99207 PR PRENATAL VISIT: CPT | Performed by: OBSTETRICS & GYNECOLOGY

## 2020-10-13 PROCEDURE — 76819 FETAL BIOPHYS PROFIL W/O NST: CPT

## 2020-10-13 PROCEDURE — 99207 PR NO CHARGE LOS: CPT | Performed by: OBSTETRICS & GYNECOLOGY

## 2020-10-13 NOTE — Clinical Note
Rajat Cifuentes,  [facility] I will be out of town. She is really nice but a little nervous.   Thanks!  Isabella Dia MD

## 2020-10-13 NOTE — PROGRESS NOTES
38w1d  Active fetal movement. No contractions, no leaking or bleeding.   US today: jacquelin breech, JEANA normal, MVP 3.8cm, EFW 3308g (55%), BPP 8/8.  Baby still breech, failed ECV, plan CS at 39 weeks.  Will schedule for Monday if possible. Discussed cs procedure, recovery.   Will get labs Friday.    Isabella Dia MD

## 2020-10-14 ENCOUNTER — TELEPHONE (OUTPATIENT)
Dept: OBGYN | Facility: CLINIC | Age: 30
End: 2020-10-14

## 2020-10-14 DIAGNOSIS — O09.90 SUPERVISION OF HIGH RISK PREGNANCY, ANTEPARTUM: ICD-10-CM

## 2020-10-14 DIAGNOSIS — Z01.818 PRE-OP EXAM: Primary | ICD-10-CM

## 2020-10-14 NOTE — PROGRESS NOTES
"Timothy Crowell is a 30 year old female who is being evaluated via a billable telephone visit.      The patient has been notified of following:     \"This telephone visit will be conducted via a call between you and your physician/provider. We have found that certain health care needs can be provided without the need for a physical exam.  This service lets us provide the care you need with a short phone conversation.  If a prescription is necessary we can send it directly to your pharmacy.  If lab work is needed we can place an order for that and you can then stop by our lab to have the test done at a later time.    Telephone visits are billed at different rates depending on your insurance coverage. During this emergency period, for some insurers they may be billed the same as an in-person visit.  Please reach out to your insurance provider with any questions.    If during the course of the call the physician/provider feels a telephone visit is not appropriate, you will not be charged for this service.\"    Patient has given verbal consent for Telephone visit?  Yes    What phone number would you like to be contacted at? [phone]    How would you like to obtain your AVS? Elfego Lomas MA    Outcome for 10/14/20 12:03 PM :Glucose sent via Email      Due to the COVID 19 pandemic this visit was converted to a telephone visit in order to help prevent spread of infection in this patient and the general population.    Time of start: 10:30 am  Time of end: 10:42 am  Total duration of telephone visit: 12 minutes.    HPI  Timothy Crowell is a 30 year old female with gestational diabetes. Telephone visit today for diabetes follow up.   No previous hx of diabetes.  Pt was seen by Dr. Castro in our clinic on 4/8/2020.  I have been monitoring her blood sugars on a regular basis.  This is Timothy's first pregnancy and she is currently [id number] pregnant.  Pt is being seen by OB at [facility].  The baby is breech and " she is scheduled for a  on 10/19/2020.  For her diabetes, she is currently taking NPH insulin 8 units subcutaneous at bedtime.  She has Novolog for meal coverage if she plans to have increase carbs for a meal. She has been taking 3-4 units with lunch and her dinner meal.  Her FBS values have been in the 79-99 range lately.  Her 1 hr postbreakfast blood sugar values are  range.  Her 1 hr postlunch blood sugar values are  range and her 1 hr postdinner bs are 110-132 range.  No hypoglycemia.  Her Freestyle Celia sensor showed an average glucose of 101 and an estimated A1C value of 5.8 %. This was data from 10/1-10/14/2020.  Pt's A1C was 5.0 % on 2020. She is anemic.  On ROS today,she feels the baby moving frequently.  She is in good spirits today.    She denies headaches, n/v, SOB at rest, cough, fever, chills or chest pain.  No abd pain or diarrhea. No dysuria or hematuria.  Patient denies vaginal spotting or bleeding. No vaginal discharge.    ROS  Please see under HPI.    Allergies  Allergies   Allergen Reactions     Seasonal Allergies        Medications  Current Outpatient Medications   Medication Sig Dispense Refill     aspirin (ASA) 81 MG EC tablet Take 1 tablet (81 mg) by mouth daily 100 tablet 1     Blood Glucose Monitoring Suppl (ONETOUCH VERIO FLEX SYSTEM) w/Device KIT 1 each 4 times daily 1 kit 0     Continuous Blood Gluc  (FREESTYLE CELIA READER) LIMA 1 Device daily 1 Device 0     Continuous Blood Gluc Sensor (FREESTYLE CELIA 14 DAY SENSOR) MISC Change every 14 days. Check fasting blood sugar and 1 hr postmeal blood sugar DAILY. 8 each 3     insulin NPH (NOVOLIN N VIAL) 100 UNIT/ML vial INJECT 6 UNITS SUBCUTANEOUS AT AT BEDTIME (Patient taking differently: 8 Units INJECT 6 UNITS SUBCUTANEOUS AT AT BEDTIME) 10 mL 11     insulin pen needle (32G X 4 MM) 32G X 4 MM miscellaneous Use 4 pen needles daily or as directed. 200 each 4     insulin pen needle (BD CARINA U/F) 32G X 4 MM  "miscellaneous Use 1 daily as directed. 100 each 1     insulin syringe-needle U-100 (31G X 5/16\" 0.3 ML) 31G X 5/16\" 0.3 ML miscellaneous Use 1 syringes daily or as directed. 100 each 1     insulin syringe-needle U-100 (BD INSULIN SYRINGE ULTRAFINE) 31G X 5/16\" 0.3 ML miscellaneous Use daily. 100 each 3     Lancets (ONETOUCH DELICA PLUS [id number]) MISC 100 each by In Vitro route 4 times daily 100 each prn     NOVOLOG FLEXPEN 100 UNIT/ML soln 1-3 units before lunch as directed.  Increase as directed. 15 mL 0     ONETOUCH VERIO IQ test strip Use to test blood sugar 4 times daily or as directed.  Ok to substitute alternative if insurance prefers. 200 strip 3     Phenylephrine HCl (AFRIN ALLERGY NA)        Prenatal Vit-Fe Fumarate-FA (PRENATAL MULTIVITAMIN W/IRON) 27-0.8 MG tablet Take 1 tablet by mouth daily 90 tablet 3     Prenatal Vit-Fe Fumarate-FA (PRENATAL VITAMINS PO)          Family History  family history includes Cerebrovascular Disease in her father; Hyperlipidemia in her sister; Liver Cancer in her mother.    Social History   reports that she has never smoked. She has never used smokeless tobacco. She reports previous alcohol use. She reports that she does not use drugs.     .  No children.  Not working at this time during COVID19 pandemic.    Past Medical History  None.    Physical Exam    No exam today.    RESULTS  Creatinine   Date Value Ref Range Status   06/02/2020 0.47 (L) 0.52 - 1.04 mg/dL Final     GFR Estimate   Date Value Ref Range Status   06/02/2020 >90 >60 mL/min/[1.73_m2] Final     Comment:     Non  GFR Calc  Starting 12/18/2018, serum creatinine based estimated GFR (eGFR) will be   calculated using the Chronic Kidney Disease Epidemiology Collaboration   (CKD-EPI) equation.       Hemoglobin A1C   Date Value Ref Range Status   07/22/2020 5.0 0 - 5.6 % Final     Comment:     Normal <5.7% Prediabetes 5.7-6.4%  Diabetes 6.5% or higher - adopted from ADA   consensus " guidelines.       ALT   Date Value Ref Range Status   2020 16 0 - 50 U/L Final     AST   Date Value Ref Range Status   2020 11 0 - 45 U/L Final     TSH   Date Value Ref Range Status   2020 1.51 0.40 - 4.00 mU/L Final       ASSESSMENT/PLAN:    1.  GESTATIONAL DIABETES: Continue NPH 8 units subcutaneous at bedtime.  Pt has Novolog to use if needed for meal coverage as above.  She is aware that we would like to see her FBS 95 or < and her 1 hr postmeal blood sugar 140 or <.  Her BP was 104/65 on 10/13/2020.   Her TSH was normal on 2020.    2.  PREGNANCY: Pt is [id number] pregnant and followed by OB at [facility].  Pt is scheduled for a   on 10/19/2020.    3.  FOLLOW UP: with me on 11/10/2020.   Pt has my contact number to call if she has any questions or if her blood sugar values are above target.  I also asked her to use [employer] if she has any questions.

## 2020-10-15 ENCOUNTER — VIRTUAL VISIT (OUTPATIENT)
Dept: ENDOCRINOLOGY | Facility: CLINIC | Age: 30
End: 2020-10-15
Payer: COMMERCIAL

## 2020-10-15 DIAGNOSIS — O24.414 INSULIN CONTROLLED GESTATIONAL DIABETES MELLITUS (GDM) IN THIRD TRIMESTER: Primary | ICD-10-CM

## 2020-10-15 PROCEDURE — 99213 OFFICE O/P EST LOW 20 MIN: CPT | Mod: 95 | Performed by: PHYSICIAN ASSISTANT

## 2020-10-15 NOTE — LETTER
"10/15/2020       RE: Timothy Crowell  [address]  [place] [address]     Dear Colleague,    Thank you for referring your patient, Timothy Crowell, to the [facility] [place] [facility]. Please see a copy of my visit note below.    Timothy Crowell is a 30 year old female who is being evaluated via a billable telephone visit.      The patient has been notified of following:     \"This telephone visit will be conducted via a call between you and your physician/provider. We have found that certain health care needs can be provided without the need for a physical exam.  This service lets us provide the care you need with a short phone conversation.  If a prescription is necessary we can send it directly to your pharmacy.  If lab work is needed we can place an order for that and you can then stop by our lab to have the test done at a later time.    Telephone visits are billed at different rates depending on your insurance coverage. During this emergency period, for some insurers they may be billed the same as an in-person visit.  Please reach out to your insurance provider with any questions.    If during the course of the call the physician/provider feels a telephone visit is not appropriate, you will not be charged for this service.\"    Patient has given verbal consent for Telephone visit?  Yes    What phone number would you like to be contacted at? [phone]    How would you like to obtain your AVS? Elfego Lomas MA    Outcome for 10/14/20 12:03 PM :Glucose sent via Email      Due to the COVID 19 pandemic this visit was converted to a telephone visit in order to help prevent spread of infection in this patient and the general population.    Time of start: 10:30 am  Time of end: 10:42 am  Total duration of telephone visit: 12 minutes.    HPI  Timothy Crowell is a 30 year old female with gestational " diabetes. Telephone visit today for diabetes follow up.   No previous hx of diabetes.  Pt was seen by Dr. Castro in our clinic on 2020.  I have been monitoring her blood sugars on a regular basis.  This is Timothy's first pregnancy and she is currently [id number] pregnant.  Pt is being seen by OB at [facility].  The baby is breech and she is scheduled for a  on 10/19/2020.  For her diabetes, she is currently taking NPH insulin 8 units subcutaneous at bedtime.  She has Novolog for meal coverage if she plans to have increase carbs for a meal. She has been taking 3-4 units with lunch and her dinner meal.  Her FBS values have been in the 79-99 range lately.  Her 1 hr postbreakfast blood sugar values are  range.  Her 1 hr postlunch blood sugar values are  range and her 1 hr postdinner bs are 110-132 range.  No hypoglycemia.  Her Freestyle Celia sensor showed an average glucose of 101 and an estimated A1C value of 5.8 %. This was data from 10/1-10/14/2020.  Pt's A1C was 5.0 % on 2020. She is anemic.  On ROS today,she feels the baby moving frequently.  She is in good spirits today.    She denies headaches, n/v, SOB at rest, cough, fever, chills or chest pain.  No abd pain or diarrhea. No dysuria or hematuria.  Patient denies vaginal spotting or bleeding. No vaginal discharge.    ROS  Please see under HPI.    Allergies  Allergies   Allergen Reactions     Seasonal Allergies        Medications  Current Outpatient Medications   Medication Sig Dispense Refill     aspirin (ASA) 81 MG EC tablet Take 1 tablet (81 mg) by mouth daily 100 tablet 1     Blood Glucose Monitoring Suppl (ONETOUCH VERIO FLEX SYSTEM) w/Device KIT 1 each 4 times daily 1 kit 0     Continuous Blood Gluc  (FREESTYLE CELIA READER) LIMA 1 Device daily 1 Device 0     Continuous Blood Gluc Sensor (FREESTYLE CELIA 14 DAY SENSOR) MISC Change every 14 days. Check fasting blood sugar and 1 hr postmeal blood sugar DAILY. 8 each 3      "insulin NPH (NOVOLIN N VIAL) 100 UNIT/ML vial INJECT 6 UNITS SUBCUTANEOUS AT AT BEDTIME (Patient taking differently: 8 Units INJECT 6 UNITS SUBCUTANEOUS AT AT BEDTIME) 10 mL 11     insulin pen needle (32G X 4 MM) 32G X 4 MM miscellaneous Use 4 pen needles daily or as directed. 200 each 4     insulin pen needle (BD CARINA U/F) 32G X 4 MM miscellaneous Use 1 daily as directed. 100 each 1     insulin syringe-needle U-100 (31G X 5/16\" 0.3 ML) 31G X 5/16\" 0.3 ML miscellaneous Use 1 syringes daily or as directed. 100 each 1     insulin syringe-needle U-100 (BD INSULIN SYRINGE ULTRAFINE) 31G X 5/16\" 0.3 ML miscellaneous Use daily. 100 each 3     Lancets (ONETOUCH DELICA PLUS [id number]) MISC 100 each by In Vitro route 4 times daily 100 each prn     NOVOLOG FLEXPEN 100 UNIT/ML soln 1-3 units before lunch as directed.  Increase as directed. 15 mL 0     ONETOUCH VERIO IQ test strip Use to test blood sugar 4 times daily or as directed.  Ok to substitute alternative if insurance prefers. 200 strip 3     Phenylephrine HCl (AFRIN ALLERGY NA)        Prenatal Vit-Fe Fumarate-FA (PRENATAL MULTIVITAMIN W/IRON) 27-0.8 MG tablet Take 1 tablet by mouth daily 90 tablet 3     Prenatal Vit-Fe Fumarate-FA (PRENATAL VITAMINS PO)          Family History  family history includes Cerebrovascular Disease in her father; Hyperlipidemia in her sister; Liver Cancer in her mother.    Social History   reports that she has never smoked. She has never used smokeless tobacco. She reports previous alcohol use. She reports that she does not use drugs.     .  No children.  Not working at this time during COVID19 pandemic.    Past Medical History  None.    Physical Exam    No exam today.    RESULTS  Creatinine   Date Value Ref Range Status   06/02/2020 0.47 (L) 0.52 - 1.04 mg/dL Final     GFR Estimate   Date Value Ref Range Status   06/02/2020 >90 >60 mL/min/[1.73_m2] Final     Comment:     Non  GFR Calc  Starting 12/18/2018, serum " creatinine based estimated GFR (eGFR) will be   calculated using the Chronic Kidney Disease Epidemiology Collaboration   (CKD-EPI) equation.       Hemoglobin A1C   Date Value Ref Range Status   2020 5.0 0 - 5.6 % Final     Comment:     Normal <5.7% Prediabetes 5.7-6.4%  Diabetes 6.5% or higher - adopted from ADA   consensus guidelines.       ALT   Date Value Ref Range Status   2020 16 0 - 50 U/L Final     AST   Date Value Ref Range Status   2020 11 0 - 45 U/L Final     TSH   Date Value Ref Range Status   2020 1.51 0.40 - 4.00 mU/L Final       ASSESSMENT/PLAN:    1.  GESTATIONAL DIABETES: Continue NPH 8 units subcutaneous at bedtime.  Pt has Novolog to use if needed for meal coverage as above.  She is aware that we would like to see her FBS 95 or < and her 1 hr postmeal blood sugar 140 or <.  Her BP was 104/65 on 10/13/2020.   Her TSH was normal on 2020.    2.  PREGNANCY: Pt is 15zs4ter pregnant and followed by OB at [facility].  Pt is scheduled for a   on 10/19/2020.    3.  FOLLOW UP: with me on 11/10/2020.   Pt has my contact number to call if she has any questions or if her blood sugar values are above target.  I also asked her to use [employer] if she has any questions.      Again, thank you for allowing me to participate in the care of your patient.      Sincerely,    Alma Salamanca PA-C

## 2020-10-16 ENCOUNTER — HOSPITAL ENCOUNTER (OUTPATIENT)
Dept: ULTRASOUND IMAGING | Facility: CLINIC | Age: 30
End: 2020-10-16
Attending: OBSTETRICS & GYNECOLOGY
Payer: COMMERCIAL

## 2020-10-16 ENCOUNTER — OFFICE VISIT (OUTPATIENT)
Dept: MATERNAL FETAL MEDICINE | Facility: CLINIC | Age: 30
End: 2020-10-16
Attending: OBSTETRICS & GYNECOLOGY
Payer: COMMERCIAL

## 2020-10-16 DIAGNOSIS — O24.113 TYPE 2 DIABETES MELLITUS COMPLICATING PREGNANCY, ANTEPARTUM, THIRD TRIMESTER: ICD-10-CM

## 2020-10-16 DIAGNOSIS — O24.113 TYPE 2 DIABETES MELLITUS COMPLICATING PREGNANCY, ANTEPARTUM, THIRD TRIMESTER: Primary | ICD-10-CM

## 2020-10-16 DIAGNOSIS — O09.90 SUPERVISION OF HIGH RISK PREGNANCY, ANTEPARTUM: ICD-10-CM

## 2020-10-16 DIAGNOSIS — Z01.818 PRE-OP EXAM: ICD-10-CM

## 2020-10-16 DIAGNOSIS — Z11.59 ENCOUNTER FOR SCREENING FOR OTHER VIRAL DISEASES: ICD-10-CM

## 2020-10-16 LAB
ABO + RH BLD: NORMAL
ABO + RH BLD: NORMAL
BLD GP AB SCN SERPL QL: NORMAL
BLOOD BANK CMNT PATIENT-IMP: NORMAL
ERYTHROCYTE [DISTWIDTH] IN BLOOD BY AUTOMATED COUNT: 14.8 % (ref 10–15)
HCT VFR BLD AUTO: 36.6 % (ref 35–47)
HGB BLD-MCNC: 12.3 G/DL (ref 11.7–15.7)
MCH RBC QN AUTO: 31 PG (ref 26.5–33)
MCHC RBC AUTO-ENTMCNC: 33.6 G/DL (ref 31.5–36.5)
MCV RBC AUTO: 92 FL (ref 78–100)
PLATELET # BLD AUTO: 213 10E9/L (ref 150–450)
RBC # BLD AUTO: 3.97 10E12/L (ref 3.8–5.2)
SPECIMEN EXP DATE BLD: NORMAL
WBC # BLD AUTO: 9.3 10E9/L (ref 4–11)

## 2020-10-16 PROCEDURE — 76819 FETAL BIOPHYS PROFIL W/O NST: CPT | Mod: 26 | Performed by: OBSTETRICS & GYNECOLOGY

## 2020-10-16 PROCEDURE — 99207 PR NO CHARGE LOS: CPT | Performed by: OBSTETRICS & GYNECOLOGY

## 2020-10-16 PROCEDURE — U0003 INFECTIOUS AGENT DETECTION BY NUCLEIC ACID (DNA OR RNA); SEVERE ACUTE RESPIRATORY SYNDROME CORONAVIRUS 2 (SARS-COV-2) (CORONAVIRUS DISEASE [COVID-19]), AMPLIFIED PROBE TECHNIQUE, MAKING USE OF HIGH THROUGHPUT TECHNOLOGIES AS DESCRIBED BY CMS-2020-01-R: HCPCS | Performed by: OBSTETRICS & GYNECOLOGY

## 2020-10-16 PROCEDURE — 86850 RBC ANTIBODY SCREEN: CPT | Performed by: OBSTETRICS & GYNECOLOGY

## 2020-10-16 PROCEDURE — 99000 SPECIMEN HANDLING OFFICE-LAB: CPT | Performed by: OBSTETRICS & GYNECOLOGY

## 2020-10-16 PROCEDURE — 86900 BLOOD TYPING SEROLOGIC ABO: CPT | Performed by: OBSTETRICS & GYNECOLOGY

## 2020-10-16 PROCEDURE — 76819 FETAL BIOPHYS PROFIL W/O NST: CPT

## 2020-10-16 PROCEDURE — 85027 COMPLETE CBC AUTOMATED: CPT | Performed by: OBSTETRICS & GYNECOLOGY

## 2020-10-16 PROCEDURE — 86780 TREPONEMA PALLIDUM: CPT | Mod: 90 | Performed by: OBSTETRICS & GYNECOLOGY

## 2020-10-16 PROCEDURE — 36415 COLL VENOUS BLD VENIPUNCTURE: CPT | Performed by: OBSTETRICS & GYNECOLOGY

## 2020-10-16 PROCEDURE — 86901 BLOOD TYPING SEROLOGIC RH(D): CPT | Performed by: OBSTETRICS & GYNECOLOGY

## 2020-10-16 NOTE — PROGRESS NOTES
Please refer to ultrasound report under 'Imaging' Studies of 'Chart Review' tabs.    Varghese Vega M.D.

## 2020-10-17 LAB
SARS-COV-2 RNA SPEC QL NAA+PROBE: NOT DETECTED
SPECIMEN SOURCE: NORMAL
T PALLIDUM AB SER QL: NONREACTIVE

## 2020-10-18 NOTE — OP NOTE
[facility]  Operative Note     Timothy Crowell  1990  [id number]     Surgery Date:  10/19/2020  Surgeon:  Vane Champagne MD  Assistants:  Wm Bermudez MD PGY-2      Pre-op Diagnosis:  1. Intrauterine pregnancy at 39w0d     2. Breech presentation     3. T2DM on insulin    Post-op Diagnosis:  1. Same      2. Liveborn female infant      3. Uterine atony    Procedure:    Primary low-transverse  section with double layer uterine closure via Pfannenstiel skin incision    Anesthesia: Spinal, TAP block  EBL:  960 mL  IVF:  1000 mL crystalloid  UOP:  300 mL urine at the end of the case  Drains: Godfrey Catheter   Specimens:  Cord segment  Complications:  None     Indications:   Timothy Crowell is a 30 year old  at 39w0d admitted for scheduled CS for breech presentation and T2DM.  The risks, benefits, and alternatives of  section were discussed with the patient, and she agreed to proceed.     Findings:   1. No adhesions  2. Clear amniotic fluid  3. Liveborn female infant in breech presentation. Apgars 8 at 1 minute & 9 at 5 minutes. Weight 3480g.  4. Arterial cord pH 7.11, base deficit 8.5. Venous cord pH 7.21, base deficit 4.1.  5. Normal uterus, fallopian tubes, and ovaries.  6. Tight nuchal cord x 1     Procedure Details:   The patient was brought to the OR, where adequate spinal anesthesia was administered.  She was placed in the dorsal supine position with a slight leftward tilt. She was prepped and draped in the usual sterile fashion. A surgical time out was performed. A pfannenstiel skin incision was made with the scalpel, and carried down to the underlying fascia with sharp and blunt dissection. The fascia was incised in the midline, and the incision was extended laterally with the Eaton scissors. The superior aspect of the fascia was grasped with the Kocher clamps and dissected off of the underlying rectus muscles with blunt and sharp dissection.  Attention was then turned to the inferior aspect of the fascia, which was similarly dissected off of the underlying rectus muscles. The rectus muscles were  in the midline, and the peritoneum was entered sharply, and the opening was extended with digital pressure and electrocautery. The bladder blade was placed. A transverse hysterotomy was made with the scalpel in the lower uterine segment, and the incision was extended with digital pressure. The infant was noted to be in the jacquelin breech position, and was delivered atraumatically with standard breech maneuvers. A tight nuchal cord was noted which was reduced after delivery. The baby appeared stunned for the first 15 seconds, but recovered with stimulation. The cord was doubly clamped and cut, and the infant was handed off to the awaiting nursery staff. A segment of cord was cut and sent to lab. The placenta was delivered via manual extraction. The uterus was exteriorized and cleared of all clots and debris. Uterine tone was poor, but improved with massage, IV pitocin, and a single dose of IM methergine. The hysterotomy was closed with a running locked suture of 0 Monocryl.  The hysterotomy was then imbricated using an 0 Monocryl suture. The hysterotomy was noted to be hemostatic. The posterior cul-de-sac was irrigated and cleared of all clots and debris. The uterus was returned to the abdomen. The pericolic gutters were irrigated and cleared of all clots and debris. The hysterotomy was reexamined and noted to be hemostatic. Seprafilm was placed across the uterine incision. The peritoneum was closed with a running 3-0 Vicryl suture. The fascia and rectus muscles were examined. Persistent oozing was noted along the right rectus muscle which was controlled with a running 2-0 Monocryl suture. A second Seprafilm was placed across the rectus muscles. The fascia was closed with a running 0 Vicryl suture. The subcutaneous tissue was irrigated and areas of oozing  were controlled with electrocautery. The subcutaneous tissue was greater than 2 cm in thickness, and was therefore closed with 2-0 Plain suture. The skin was closed with 4-0 Monocryl and covered with a sterile dressing.    All sponge, needle, and instrument counts were correct. The patient tolerated the procedure well, and was transferred to recovery in stable condition. Dr. Champagne was present and scrubbed for the entirety of the procedure.     Wm Bermudez MD MPH  OB/Gyn PGY-2  10/19/20 1:20 PM    Physician Attestation   I was present for the entire procedure between opening and closing. I have reviewed and edited the above operative report to reflect the exact findings and details of the surgical procedure.  Baby had a tight nuchal cord and appeared stunned for the first 15 sec or so but came around quickly with stimulation. Uterine atony noted and managed with IV pitocin and methergine. Remainder of case was uncomplicated.     Vane Champagne MD   October 19, 2020

## 2020-10-18 NOTE — H&P
L&D History and Physical   2020  Timothy Crowell  [id number]    HPI:   Timothy Crowell is a 30 year old  at 39w0d by 7w2d US not c/w LMP who presents for scheduled CS for breech presentation. Pregnancy notable for pregestational diabetes diagnosed in early pregnancy, controlled with NPH insulin and depression. An ECV was attempted and failed on 10/6/20.    She states that she is feeling well today.  She denies headache, vision changes, chest pain, shortness of breath, fever, chills, nausea, vomiting or other systemic complaints. She denies vaginal bleeding or loss of fluid and is feeling normal fetal movement. Not feeling contractions. Mood is good though she has felt sad at times.     Her pregnancy is notable for:  - T2DM, on 8U NPH at bedtime, Novolog  but not consistently taking  - Depression, no current medication    ROS:   As above    OBHX:   OB History    Para Term  AB Living   1 0 0 0 0 0   SAB TAB Ectopic Multiple Live Births   0 0 0 0 0      # Outcome Date GA Lbr Mateus/2nd Weight Sex Delivery Anes PTL Lv   1 Current                Past Medical History:   Diagnosis Date     Diabetes (H)     gestational       Past Surgical History:   Procedure Laterality Date     APPENDECTOMY       EXAM UNDER ANESTHESIA PELVIC N/A 10/6/2020    Procedure: EXAM UNDER ANESTHESIA, PELVIS: ;  Surgeon: Mya Lancaster MD;  Location:  L+D       Medications:   No current facility-administered medications on file prior to encounter.        aspirin (ASA) 81 MG EC tablet, Take 1 tablet (81 mg) by mouth daily       Blood Glucose Monitoring Suppl (ONETOUCH VERIO FLEX SYSTEM) w/Device KIT, 1 each 4 times daily       Continuous Blood Gluc  (FREESTYLE LEONELA READER) LIM[place], 1 Device daily       Continuous Blood Gluc Sensor (FREESTYLE LEONELA 14 DAY SENSOR) MISC, Change every 14 days. Check fasting blood sugar and 1 hr postmeal blood sugar DAILY.       insulin NPH (NOVOLIN N VIAL)  "100 UNIT/ML vial, INJECT 6 UNITS SUBCUTANEOUS AT AT BEDTIME (Patient taking differently: 8 Units INJECT 6 UNITS SUBCUTANEOUS AT AT BEDTIME)       insulin pen needle (32G X 4 MM) 32G X 4 MM miscellaneous, Use 4 pen needles daily or as directed.       insulin pen needle (BD CARINA U/F) 32G X 4 MM miscellaneous, Use 1 daily as directed.       insulin syringe-needle U-100 (31G X 5/16\" 0.3 ML) 31G X 5/16\" 0.3 ML miscellaneous, Use 1 syringes daily or as directed.       insulin syringe-needle U-100 (BD INSULIN SYRINGE ULTRAFINE) 31G X 5/16\" 0.3 ML miscellaneous, Use daily.       Lancets (ONETOUCH DELICA PLUS [id number]) MISC, 100 each by In Vitro route 4 times daily       NOVOLOG FLEXPEN 100 UNIT/ML soln, 1-3 units before lunch as directed.  Increase as directed.       ONETOUCH VERIO IQ test strip, Use to test blood sugar 4 times daily or as directed.  Ok to substitute alternative if insurance prefers.       Phenylephrine HCl (AFRIN ALLERGY NA),        Prenatal Vit-Fe Fumarate-FA (PRENATAL MULTIVITAMIN W/IRON) 27-0.8 MG tablet, Take 1 tablet by mouth daily       Prenatal Vit-Fe Fumarate-FA (PRENATAL VITAMINS PO),         Allergies   Allergen Reactions     Seasonal Allergies        Family History   Problem Relation Age of Onset     Liver Cancer Mother      Cerebrovascular Disease Father      Hyperlipidemia Sister        SocialHX:   Social History     Tobacco Use     Smoking status: Never Smoker     Smokeless tobacco: Never Used   Substance Use Topics     Alcohol use: Not Currently     Frequency: Never     Drug use: Never       ROS: 10-point ROS negative except as indicated in HPI.    Physical Exam:  Vitals:    10/19/20 0645   BP: 118/70   Resp: 18   Temp: 97.9  F (36.6  C)   TempSrc: Oral   Weight: 67.1 kg (148 lb)   Height: 1.524 m (5')     General: alert, oriented female, resting in bed in NAD  CV: regular rate and rhythm  Lungs: clear bilaterally, no crackles or wheezes.   Abdomen: soft, gravid, non-tender, EFW " 7.5lbs.  Extremities: bilateral lower extremities non-tender with trace edema    SVE: Deferred    Presentation: Breech by BUSUS    FHT: baseline 140, moderate variability, accelerations present, decelerations absent, reactive NST  [place]: 2 contractions in ten minutes    Prenatal ultrasounds:  10/13/20:  EFW 55%ile  Breech  Posterior placenta  MVP 3.8    Prenatal Labs:   Lab Results   Component Value Date    ABO PENDING 10/19/2020    RH Pos 10/16/2020    AS PENDING 10/19/2020    HEPBANG Nonreactive 2020    HGB 12.3 10/16/2020       GBS Status:   Lab Results   Component Value Date    GBS Negative 10/02/2020     Labs:   Patient Vitals for the past 24 hrs:   BP Temp Temp src Resp Height Weight   10/19/20 0645 118/70 97.9  F (36.6  C) Oral 18 1.524 m (5') 67.1 kg (148 lb)       Assessment:   Timothy Crowell is a 30 year old  at 39w0d by 7w2d US not c/w LMP who presents for scheduled CS for breech presentation. Pregnancy notable GDMA2 diagnosed in early pregnancy and depression not any medication.    Plan:  # section:  - Admitted to L&D for scheduled repeat  section.  - Consent obtained: The risks, benefits, and alternatives of  section were discussed, including the risks of bleeding, infection, injury to surrounding organs, fetal injury, and remote risks of hysterectomy. She consented to a blood transfusion in the event of a life threatening amount of bleeding. She had time to ask questions and agreed to proceed. Surgical consent was signed. Blood transfusion consent signed.   - Labs: CBC, T&S  - Fen/GI: NPO, IVFs, Godfrey.  - Pain: Spinal per anesthesia.   - ID: Ancef for yogi-op antibiotics.  - PPX: SCDs prior to surgery     #GDMA2  - Diagnosed early pregnancy with elevated a1c (5.9), and failed early GCT and GTT  - Controlled on 8U NPH at bedtime, Novolog 0/5/5 but not always taking  - Endo consult postpartum PRN    #Depression  - No current medication  - Mood stable, though sad at  times  - Declines SW consult postpartum    #Fetal Well Being  -Category I FHT.  Continue EFM.     #PNC:     - Rh positive, Rubella immune  - GBS negative  - Placenta: posterior    #Postpartum planning  - Feed: Breast  -Contraception: declines    - Pap at 6wk postpartum appointment     Patient seen and discussed under supervision of Dr. Champagne.    Wm Bermudez MD MPH  OBGYN Resident, PGY-2  PGY-2 Pager: [phone]  10/19/2020 8:20 AM     Physician Attestation   I, Vane Champagne MD, personally examined and evaluated this patient.  I discussed the patient with the resident and care team, and agree with the assessment and plan of care as documented in the note above.   I personally reviewed vital signs, medications, labs, fetal heart tones and toco.  Key findings: Patient is here for a scheduled  section for Breech after a failed ECV.  Fetal status is reassuring with a reactive NST.  discussed normal expectations for  section and recovery. Will monitor BG levels post delivery and consult endocrine if they remain elevated.  discussed most likely BG levels will normalize after delivery.   Vane Champagne MD   2020

## 2020-10-19 ENCOUNTER — ANCILLARY PROCEDURE (OUTPATIENT)
Dept: ULTRASOUND IMAGING | Facility: CLINIC | Age: 30
End: 2020-10-19
Payer: COMMERCIAL

## 2020-10-19 ENCOUNTER — HOSPITAL ENCOUNTER (INPATIENT)
Facility: CLINIC | Age: 30
LOS: 2 days | Discharge: HOME | End: 2020-10-21
Attending: OBSTETRICS & GYNECOLOGY | Admitting: OBSTETRICS & GYNECOLOGY
Payer: COMMERCIAL

## 2020-10-19 DIAGNOSIS — Z98.891 S/P CESAREAN SECTION: Primary | ICD-10-CM

## 2020-10-19 LAB
ABO + RH BLD: NORMAL
ABO + RH BLD: NORMAL
BLD GP AB SCN SERPL QL: NORMAL
BLOOD BANK CMNT PATIENT-IMP: NORMAL
GLUCOSE BLDC GLUCOMTR-MCNC: 83 MG/DL (ref 70–99)
GLUCOSE BLDC GLUCOMTR-MCNC: 85 MG/DL (ref 70–99)
GLUCOSE BLDC GLUCOMTR-MCNC: 87 MG/DL (ref 70–99)
GLUCOSE BLDC GLUCOMTR-MCNC: 87 MG/DL (ref 70–99)
SPECIMEN EXP DATE BLD: NORMAL
T PALLIDUM AB SER QL: NONREACTIVE

## 2020-10-19 PROCEDURE — 258N000003 HC RX IP 258 OP 636: Performed by: OBSTETRICS & GYNECOLOGY

## 2020-10-19 PROCEDURE — C9290 INJ, BUPIVACAINE LIPOSOME: HCPCS | Performed by: STUDENT IN AN ORGANIZED HEALTH CARE EDUCATION/TRAINING PROGRAM

## 2020-10-19 PROCEDURE — 86900 BLOOD TYPING SEROLOGIC ABO: CPT | Performed by: OBSTETRICS & GYNECOLOGY

## 2020-10-19 PROCEDURE — 271N000001 HC OR GENERAL SUPPLY NON-STERILE: Performed by: OBSTETRICS & GYNECOLOGY

## 2020-10-19 PROCEDURE — 258N000003 HC RX IP 258 OP 636: Performed by: STUDENT IN AN ORGANIZED HEALTH CARE EDUCATION/TRAINING PROGRAM

## 2020-10-19 PROCEDURE — 250N000009 HC RX 250: Performed by: STUDENT IN AN ORGANIZED HEALTH CARE EDUCATION/TRAINING PROGRAM

## 2020-10-19 PROCEDURE — 258N000003 HC RX IP 258 OP 636

## 2020-10-19 PROCEDURE — 86901 BLOOD TYPING SEROLOGIC RH(D): CPT | Performed by: OBSTETRICS & GYNECOLOGY

## 2020-10-19 PROCEDURE — 36415 COLL VENOUS BLD VENIPUNCTURE: CPT | Performed by: OBSTETRICS & GYNECOLOGY

## 2020-10-19 PROCEDURE — 999N000139 HC STATISTIC PRE-PROCEDURE ASSESSMENT II: Performed by: OBSTETRICS & GYNECOLOGY

## 2020-10-19 PROCEDURE — 999N000016 HC STATISTIC ATTENDANCE AT DELIVERY

## 2020-10-19 PROCEDURE — 360N000022 HC SURGERY LEVEL 3 1ST 30 MIN - UMMC: Performed by: OBSTETRICS & GYNECOLOGY

## 2020-10-19 PROCEDURE — 272N000001 HC OR GENERAL SUPPLY STERILE: Performed by: OBSTETRICS & GYNECOLOGY

## 2020-10-19 PROCEDURE — 370N000002 HC ANESTHESIA TECHNICAL FEE, EACH ADDTL 15 MIN: Performed by: OBSTETRICS & GYNECOLOGY

## 2020-10-19 PROCEDURE — 250N000011 HC RX IP 250 OP 636: Performed by: OBSTETRICS & GYNECOLOGY

## 2020-10-19 PROCEDURE — 250N000011 HC RX IP 250 OP 636: Performed by: STUDENT IN AN ORGANIZED HEALTH CARE EDUCATION/TRAINING PROGRAM

## 2020-10-19 PROCEDURE — 59510 CESAREAN DELIVERY: CPT | Mod: GC | Performed by: OBSTETRICS & GYNECOLOGY

## 2020-10-19 PROCEDURE — 250N000013 HC RX MED GY IP 250 OP 250 PS 637

## 2020-10-19 PROCEDURE — 999N001017 HC STATISTIC GLUCOSE BY METER IP

## 2020-10-19 PROCEDURE — 761N000004 HC RECOVERY PHASE 1 LEVEL 2 EA ADDTL HR: Performed by: OBSTETRICS & GYNECOLOGY

## 2020-10-19 PROCEDURE — 360N000023 HC SURGERY LEVEL 3 EA 15 ADDTL MIN UMMC: Performed by: OBSTETRICS & GYNECOLOGY

## 2020-10-19 PROCEDURE — 250N000013 HC RX MED GY IP 250 OP 250 PS 637: Performed by: STUDENT IN AN ORGANIZED HEALTH CARE EDUCATION/TRAINING PROGRAM

## 2020-10-19 PROCEDURE — 120N000002 HC R&B MED SURG/OB UMMC

## 2020-10-19 PROCEDURE — 86780 TREPONEMA PALLIDUM: CPT | Performed by: OBSTETRICS & GYNECOLOGY

## 2020-10-19 PROCEDURE — 370N000001 HC ANESTHESIA TECHNICAL FEE, 1ST 30 MIN: Performed by: OBSTETRICS & GYNECOLOGY

## 2020-10-19 PROCEDURE — 761N000003 HC RECOVERY PHASE 1 LEVEL 2 FIRST HR: Performed by: OBSTETRICS & GYNECOLOGY

## 2020-10-19 PROCEDURE — C1765 ADHESION BARRIER: HCPCS | Performed by: OBSTETRICS & GYNECOLOGY

## 2020-10-19 PROCEDURE — 86850 RBC ANTIBODY SCREEN: CPT | Performed by: OBSTETRICS & GYNECOLOGY

## 2020-10-19 RX ORDER — LIDOCAINE 40 MG/G
CREAM TOPICAL
Status: DISCONTINUED | OUTPATIENT
Start: 2020-10-19 | End: 2020-10-19

## 2020-10-19 RX ORDER — SODIUM CHLORIDE, SODIUM LACTATE, POTASSIUM CHLORIDE, CALCIUM CHLORIDE 600; 310; 30; 20 MG/100ML; MG/100ML; MG/100ML; MG/100ML
INJECTION, SOLUTION INTRAVENOUS CONTINUOUS PRN
Status: DISCONTINUED | OUTPATIENT
Start: 2020-10-19 | End: 2020-10-19

## 2020-10-19 RX ORDER — AMOXICILLIN 250 MG
1 CAPSULE ORAL DAILY
Qty: 100 TABLET | Refills: 0 | Status: SHIPPED | OUTPATIENT
Start: 2020-10-19 | End: 2022-05-19

## 2020-10-19 RX ORDER — OXYTOCIN/0.9 % SODIUM CHLORIDE 30/500 ML
340 PLASTIC BAG, INJECTION (ML) INTRAVENOUS CONTINUOUS PRN
Status: DISCONTINUED | OUTPATIENT
Start: 2020-10-19 | End: 2020-10-21 | Stop reason: HOSPADM

## 2020-10-19 RX ORDER — MODIFIED LANOLIN
OINTMENT (GRAM) TOPICAL
Status: DISCONTINUED | OUTPATIENT
Start: 2020-10-19 | End: 2020-10-21 | Stop reason: HOSPADM

## 2020-10-19 RX ORDER — EPHEDRINE SULFATE 50 MG/ML
5 INJECTION, SOLUTION INTRAMUSCULAR; INTRAVENOUS; SUBCUTANEOUS
Status: DISCONTINUED | OUTPATIENT
Start: 2020-10-19 | End: 2020-10-19

## 2020-10-19 RX ORDER — METHYLERGONOVINE MALEATE 0.2 MG/ML
INJECTION INTRAVENOUS PRN
Status: DISCONTINUED | OUTPATIENT
Start: 2020-10-19 | End: 2020-10-19

## 2020-10-19 RX ORDER — FENTANYL CITRATE 50 UG/ML
INJECTION, SOLUTION INTRAMUSCULAR; INTRAVENOUS PRN
Status: DISCONTINUED | OUTPATIENT
Start: 2020-10-19 | End: 2020-10-19

## 2020-10-19 RX ORDER — HYDROCORTISONE 2.5 %
CREAM (GRAM) TOPICAL 3 TIMES DAILY PRN
Status: DISCONTINUED | OUTPATIENT
Start: 2020-10-19 | End: 2020-10-21 | Stop reason: HOSPADM

## 2020-10-19 RX ORDER — ACETAMINOPHEN 325 MG/1
650 TABLET ORAL EVERY 6 HOURS PRN
Qty: 100 TABLET | Refills: 0 | Status: SHIPPED | OUTPATIENT
Start: 2020-10-19 | End: 2022-05-19

## 2020-10-19 RX ORDER — OXYCODONE HYDROCHLORIDE 5 MG/1
5 TABLET ORAL EVERY 4 HOURS PRN
Status: DISCONTINUED | OUTPATIENT
Start: 2020-10-19 | End: 2020-10-21 | Stop reason: HOSPADM

## 2020-10-19 RX ORDER — NALBUPHINE HYDROCHLORIDE 20 MG/ML
2.5-5 INJECTION, SOLUTION INTRAMUSCULAR; INTRAVENOUS; SUBCUTANEOUS EVERY 6 HOURS PRN
Status: DISCONTINUED | OUTPATIENT
Start: 2020-10-19 | End: 2020-10-19

## 2020-10-19 RX ORDER — IBUPROFEN 800 MG/1
800 TABLET, FILM COATED ORAL EVERY 6 HOURS
Status: DISCONTINUED | OUTPATIENT
Start: 2020-10-20 | End: 2020-10-21 | Stop reason: HOSPADM

## 2020-10-19 RX ORDER — IBUPROFEN 600 MG/1
600 TABLET, FILM COATED ORAL EVERY 6 HOURS PRN
Qty: 60 TABLET | Refills: 0 | Status: SHIPPED | OUTPATIENT
Start: 2020-10-19 | End: 2022-05-19

## 2020-10-19 RX ORDER — METHYLERGONOVINE MALEATE 0.2 MG/ML
200 INJECTION INTRAVENOUS
Status: DISCONTINUED | OUTPATIENT
Start: 2020-10-19 | End: 2020-10-21 | Stop reason: HOSPADM

## 2020-10-19 RX ORDER — BUPIVACAINE HYDROCHLORIDE 7.5 MG/ML
INJECTION, SOLUTION INTRASPINAL PRN
Status: DISCONTINUED | OUTPATIENT
Start: 2020-10-19 | End: 2020-10-19

## 2020-10-19 RX ORDER — NALOXONE HYDROCHLORIDE 0.4 MG/ML
.1-.4 INJECTION, SOLUTION INTRAMUSCULAR; INTRAVENOUS; SUBCUTANEOUS
Status: DISCONTINUED | OUTPATIENT
Start: 2020-10-19 | End: 2020-10-19

## 2020-10-19 RX ORDER — FENTANYL CITRATE-0.9 % NACL/PF 10 MCG/ML
PLASTIC BAG, INJECTION (ML) INTRAVENOUS CONTINUOUS PRN
Status: DISCONTINUED | OUTPATIENT
Start: 2020-10-19 | End: 2020-10-19

## 2020-10-19 RX ORDER — SODIUM CHLORIDE, SODIUM LACTATE, POTASSIUM CHLORIDE, CALCIUM CHLORIDE 600; 310; 30; 20 MG/100ML; MG/100ML; MG/100ML; MG/100ML
INJECTION, SOLUTION INTRAVENOUS CONTINUOUS
Status: DISCONTINUED | OUTPATIENT
Start: 2020-10-19 | End: 2020-10-19 | Stop reason: HOSPADM

## 2020-10-19 RX ORDER — OXYTOCIN/0.9 % SODIUM CHLORIDE 30/500 ML
PLASTIC BAG, INJECTION (ML) INTRAVENOUS CONTINUOUS PRN
Status: DISCONTINUED | OUTPATIENT
Start: 2020-10-19 | End: 2020-10-19

## 2020-10-19 RX ORDER — DIPHENHYDRAMINE HYDROCHLORIDE 50 MG/ML
25 INJECTION INTRAMUSCULAR; INTRAVENOUS EVERY 6 HOURS PRN
Status: DISCONTINUED | OUTPATIENT
Start: 2020-10-19 | End: 2020-10-21 | Stop reason: HOSPADM

## 2020-10-19 RX ORDER — SODIUM CHLORIDE, SODIUM LACTATE, POTASSIUM CHLORIDE, CALCIUM CHLORIDE 600; 310; 30; 20 MG/100ML; MG/100ML; MG/100ML; MG/100ML
INJECTION, SOLUTION INTRAVENOUS
Status: COMPLETED
Start: 2020-10-19 | End: 2020-10-19

## 2020-10-19 RX ORDER — SIMETHICONE 80 MG
80 TABLET,CHEWABLE ORAL 4 TIMES DAILY PRN
Status: DISCONTINUED | OUTPATIENT
Start: 2020-10-19 | End: 2020-10-21 | Stop reason: HOSPADM

## 2020-10-19 RX ORDER — CARBOPROST TROMETHAMINE 250 UG/ML
250 INJECTION, SOLUTION INTRAMUSCULAR
Status: DISCONTINUED | OUTPATIENT
Start: 2020-10-19 | End: 2020-10-21 | Stop reason: HOSPADM

## 2020-10-19 RX ORDER — KETOROLAC TROMETHAMINE 30 MG/ML
INJECTION, SOLUTION INTRAMUSCULAR; INTRAVENOUS PRN
Status: DISCONTINUED | OUTPATIENT
Start: 2020-10-19 | End: 2020-10-19

## 2020-10-19 RX ORDER — NALOXONE HYDROCHLORIDE 0.4 MG/ML
.1-.4 INJECTION, SOLUTION INTRAMUSCULAR; INTRAVENOUS; SUBCUTANEOUS
Status: DISCONTINUED | OUTPATIENT
Start: 2020-10-19 | End: 2020-10-21 | Stop reason: HOSPADM

## 2020-10-19 RX ORDER — CITRIC ACID/SODIUM CITRATE 334-500MG
SOLUTION, ORAL ORAL
Status: COMPLETED
Start: 2020-10-19 | End: 2020-10-19

## 2020-10-19 RX ORDER — SODIUM CHLORIDE, SODIUM LACTATE, POTASSIUM CHLORIDE, CALCIUM CHLORIDE 600; 310; 30; 20 MG/100ML; MG/100ML; MG/100ML; MG/100ML
INJECTION, SOLUTION INTRAVENOUS CONTINUOUS
Status: DISCONTINUED | OUTPATIENT
Start: 2020-10-19 | End: 2020-10-21 | Stop reason: HOSPADM

## 2020-10-19 RX ORDER — OXYTOCIN/0.9 % SODIUM CHLORIDE 30/500 ML
100 PLASTIC BAG, INJECTION (ML) INTRAVENOUS CONTINUOUS
Status: DISCONTINUED | OUTPATIENT
Start: 2020-10-19 | End: 2020-10-21 | Stop reason: HOSPADM

## 2020-10-19 RX ORDER — AMOXICILLIN 250 MG
2 CAPSULE ORAL 2 TIMES DAILY
Status: DISCONTINUED | OUTPATIENT
Start: 2020-10-19 | End: 2020-10-21 | Stop reason: HOSPADM

## 2020-10-19 RX ORDER — DIPHENHYDRAMINE HCL 25 MG
25 CAPSULE ORAL EVERY 6 HOURS PRN
Status: DISCONTINUED | OUTPATIENT
Start: 2020-10-19 | End: 2020-10-21 | Stop reason: HOSPADM

## 2020-10-19 RX ORDER — OXYTOCIN 10 [USP'U]/ML
10 INJECTION, SOLUTION INTRAMUSCULAR; INTRAVENOUS
Status: DISCONTINUED | OUTPATIENT
Start: 2020-10-19 | End: 2020-10-21 | Stop reason: HOSPADM

## 2020-10-19 RX ORDER — DEXTROSE MONOHYDRATE 25 G/50ML
25-50 INJECTION, SOLUTION INTRAVENOUS
Status: DISCONTINUED | OUTPATIENT
Start: 2020-10-19 | End: 2020-10-21 | Stop reason: HOSPADM

## 2020-10-19 RX ORDER — MORPHINE SULFATE 1 MG/ML
INJECTION, SOLUTION EPIDURAL; INTRATHECAL; INTRAVENOUS PRN
Status: DISCONTINUED | OUTPATIENT
Start: 2020-10-19 | End: 2020-10-19

## 2020-10-19 RX ORDER — DEXTROSE, SODIUM CHLORIDE, SODIUM LACTATE, POTASSIUM CHLORIDE, AND CALCIUM CHLORIDE 5; .6; .31; .03; .02 G/100ML; G/100ML; G/100ML; G/100ML; G/100ML
INJECTION, SOLUTION INTRAVENOUS CONTINUOUS
Status: DISCONTINUED | OUTPATIENT
Start: 2020-10-19 | End: 2020-10-19

## 2020-10-19 RX ORDER — ONDANSETRON 2 MG/ML
4 INJECTION INTRAMUSCULAR; INTRAVENOUS EVERY 6 HOURS PRN
Status: DISCONTINUED | OUTPATIENT
Start: 2020-10-19 | End: 2020-10-21 | Stop reason: HOSPADM

## 2020-10-19 RX ORDER — CEFAZOLIN SODIUM 2 G/100ML
2 INJECTION, SOLUTION INTRAVENOUS
Status: COMPLETED | OUTPATIENT
Start: 2020-10-19 | End: 2020-10-19

## 2020-10-19 RX ORDER — CEFAZOLIN SODIUM 1 G/3ML
1 INJECTION, POWDER, FOR SOLUTION INTRAMUSCULAR; INTRAVENOUS SEE ADMIN INSTRUCTIONS
Status: DISCONTINUED | OUTPATIENT
Start: 2020-10-19 | End: 2020-10-19 | Stop reason: HOSPADM

## 2020-10-19 RX ORDER — CITRIC ACID/SODIUM CITRATE 334-500MG
30 SOLUTION, ORAL ORAL
Status: COMPLETED | OUTPATIENT
Start: 2020-10-19 | End: 2020-10-19

## 2020-10-19 RX ORDER — ONDANSETRON 2 MG/ML
INJECTION INTRAMUSCULAR; INTRAVENOUS PRN
Status: DISCONTINUED | OUTPATIENT
Start: 2020-10-19 | End: 2020-10-19

## 2020-10-19 RX ORDER — BISACODYL 10 MG
10 SUPPOSITORY, RECTAL RECTAL DAILY PRN
Status: DISCONTINUED | OUTPATIENT
Start: 2020-10-21 | End: 2020-10-21 | Stop reason: HOSPADM

## 2020-10-19 RX ORDER — BUPIVACAINE HYDROCHLORIDE 2.5 MG/ML
INJECTION, SOLUTION EPIDURAL; INFILTRATION; INTRACAUDAL PRN
Status: DISCONTINUED | OUTPATIENT
Start: 2020-10-19 | End: 2020-10-19

## 2020-10-19 RX ORDER — AMOXICILLIN 250 MG
1 CAPSULE ORAL 2 TIMES DAILY
Status: DISCONTINUED | OUTPATIENT
Start: 2020-10-19 | End: 2020-10-21 | Stop reason: HOSPADM

## 2020-10-19 RX ORDER — ACETAMINOPHEN 325 MG/1
975 TABLET ORAL EVERY 6 HOURS
Status: DISCONTINUED | OUTPATIENT
Start: 2020-10-19 | End: 2020-10-21 | Stop reason: HOSPADM

## 2020-10-19 RX ORDER — NICOTINE POLACRILEX 4 MG
15-30 LOZENGE BUCCAL
Status: DISCONTINUED | OUTPATIENT
Start: 2020-10-19 | End: 2020-10-21 | Stop reason: HOSPADM

## 2020-10-19 RX ORDER — KETOROLAC TROMETHAMINE 30 MG/ML
30 INJECTION, SOLUTION INTRAMUSCULAR; INTRAVENOUS EVERY 6 HOURS
Status: COMPLETED | OUTPATIENT
Start: 2020-10-19 | End: 2020-10-20

## 2020-10-19 RX ADMIN — ONDANSETRON 4 MG: 2 INJECTION INTRAMUSCULAR; INTRAVENOUS at 09:14

## 2020-10-19 RX ADMIN — Medication 50 MCG/MIN: at 08:44

## 2020-10-19 RX ADMIN — PHENYLEPHRINE HYDROCHLORIDE 200 MCG: 10 INJECTION INTRAVENOUS at 09:15

## 2020-10-19 RX ADMIN — SODIUM CITRATE AND CITRIC ACID MONOHYDRATE 30 ML: 500; 334 SOLUTION ORAL at 08:30

## 2020-10-19 RX ADMIN — KETOROLAC TROMETHAMINE 30 MG: 30 INJECTION, SOLUTION INTRAMUSCULAR at 10:21

## 2020-10-19 RX ADMIN — BUPIVACAINE HYDROCHLORIDE IN DEXTROSE 1.5 ML: 7.5 INJECTION, SOLUTION SUBARACHNOID at 08:43

## 2020-10-19 RX ADMIN — KETOROLAC TROMETHAMINE 30 MG: 30 INJECTION, SOLUTION INTRAMUSCULAR; INTRAVENOUS at 22:50

## 2020-10-19 RX ADMIN — PHENYLEPHRINE HYDROCHLORIDE 100 MCG: 10 INJECTION INTRAVENOUS at 09:21

## 2020-10-19 RX ADMIN — BUPIVACAINE HYDROCHLORIDE 20 ML: 2.5 INJECTION, SOLUTION EPIDURAL; INFILTRATION; INTRACAUDAL at 10:41

## 2020-10-19 RX ADMIN — METHYLERGONOVINE MALEATE 200 MCG: 0.2 INJECTION INTRAMUSCULAR; INTRAVENOUS at 09:18

## 2020-10-19 RX ADMIN — PHENYLEPHRINE HYDROCHLORIDE 100 MCG: 10 INJECTION INTRAVENOUS at 09:36

## 2020-10-19 RX ADMIN — SODIUM CHLORIDE, SODIUM LACTATE, POTASSIUM CHLORIDE, CALCIUM CHLORIDE: 600; 310; 30; 20 INJECTION, SOLUTION INTRAVENOUS at 07:44

## 2020-10-19 RX ADMIN — Medication 30 ML: at 08:30

## 2020-10-19 RX ADMIN — FENTANYL CITRATE 10 MCG: 50 INJECTION, SOLUTION INTRAMUSCULAR; INTRAVENOUS at 08:43

## 2020-10-19 RX ADMIN — SODIUM CHLORIDE, POTASSIUM CHLORIDE, SODIUM LACTATE AND CALCIUM CHLORIDE: 600; 310; 30; 20 INJECTION, SOLUTION INTRAVENOUS at 07:44

## 2020-10-19 RX ADMIN — SODIUM CHLORIDE, POTASSIUM CHLORIDE, SODIUM LACTATE AND CALCIUM CHLORIDE 125 ML/HR: 600; 310; 30; 20 INJECTION, SOLUTION INTRAVENOUS at 16:26

## 2020-10-19 RX ADMIN — SODIUM CHLORIDE, POTASSIUM CHLORIDE, SODIUM LACTATE AND CALCIUM CHLORIDE: 600; 310; 30; 20 INJECTION, SOLUTION INTRAVENOUS at 08:32

## 2020-10-19 RX ADMIN — PHENYLEPHRINE HYDROCHLORIDE 100 MCG: 10 INJECTION INTRAVENOUS at 09:31

## 2020-10-19 RX ADMIN — KETOROLAC TROMETHAMINE 30 MG: 30 INJECTION, SOLUTION INTRAMUSCULAR; INTRAVENOUS at 16:28

## 2020-10-19 RX ADMIN — OXYTOCIN-SODIUM CHLORIDE 0.9% IV SOLN 30 UNIT/500ML 300 ML/HR: 30-0.9/5 SOLUTION at 09:13

## 2020-10-19 RX ADMIN — ACETAMINOPHEN 975 MG: 325 TABLET, FILM COATED ORAL at 15:04

## 2020-10-19 RX ADMIN — CEFAZOLIN 2 G: 10 INJECTION, POWDER, FOR SOLUTION INTRAVENOUS at 08:51

## 2020-10-19 RX ADMIN — PHENYLEPHRINE HYDROCHLORIDE 100 MCG: 10 INJECTION INTRAVENOUS at 08:48

## 2020-10-19 RX ADMIN — ACETAMINOPHEN 975 MG: 325 TABLET, FILM COATED ORAL at 21:27

## 2020-10-19 RX ADMIN — MORPHINE SULFATE 0.15 MG: 1 INJECTION EPIDURAL; INTRATHECAL; INTRAVENOUS at 08:43

## 2020-10-19 RX ADMIN — BUPIVACAINE 20 ML: 13.3 INJECTION, SUSPENSION, LIPOSOMAL INFILTRATION at 10:41

## 2020-10-19 RX ADMIN — Medication 100 ML/HR: at 11:14

## 2020-10-19 RX ADMIN — DOCUSATE SODIUM 50 MG AND SENNOSIDES 8.6 MG 2 TABLET: 8.6; 5 TABLET, FILM COATED ORAL at 20:06

## 2020-10-19 ASSESSMENT — MIFFLIN-ST. JEOR: SCORE: 1312.82

## 2020-10-19 NOTE — DISCHARGE SUMMARY
DELIVERY DISCHARGE SUMMARY    Timothy Crowell  : 1990  MRN: [id number]    Admit date: 10/19/2020  Discharge date: 10/21/2020    Admit Dx:   - 30 year old  at 39w0d  - Depression  - Type II DM  - Breech presentation    Discharge Dx:  - Same as above, s/p primary low transverse  section  - Acute blood loss anemia    Procedures:  - Primary low transverse  section with double layer closure via Pfannenstiel incision  - Spinal analgesia    Admit HPI:  Ms. Timothy Crowell is a 30 year old  at 39w0d who was admitted on 10/19/2020 for a scheduled primary  section. Please see her admit H&P for full details of her PMH, PSH, Meds, Allergies and exam on admit.    Hospital course:  After discussion of risk and and benefits and signing informed consent the patient was taken to the operating room for primary  section.    Operative Findings:  Findings:   1. Clear amniotic fluid  2. Liveborn female infant in breech presentation. Apgars 8 at 1 minute & 9 at 5 minutes. Weight pending.  3. Normal uterus, fallopian tubes, and ovaries.   4. Cord gas arterial pH: 7.11, BE 8.5.    EBL from the delivery was 960mL. Please see her  Section Operative Note for full details regarding her delivery.    Her postoperative course was uncomplicated. Her blood sugars were monitored and between . She did not require medication for glucose control. On POD#2, she was meeting all of her postpartum goals and deemed stable for discharge. She was voiding without difficulty, tolerating a regular diet without nausea and vomiting, her pain was well controlled on oral pain medicines and her lochia was appropriate. Her hemoglobin prior to delivery was 12.3 and after delivery was 9.4. Her Rh status was positive and Rhogam was not indicated.      Discharge Medications:  Current Discharge Medication List      CONTINUE these medications which have NOT CHANGED    Details   aspirin (ASA)  "81 MG EC tablet Take 1 tablet (81 mg) by mouth daily  Qty: 100 tablet, Refills: 1    Associated Diagnoses: Supervision of normal first pregnancy in second trimester; Type 2 diabetes mellitus without complication, with long-term current use of insulin (H)      Blood Glucose Monitoring Suppl (ONETOUCH VERIO FLEX SYSTEM) w/Device KIT 1 each 4 times daily  Qty: 1 kit, Refills: 0    Comments: Okay to prescribe other OneTouch Verio meter if Flex is not available  Associated Diagnoses: Type 2 diabetes mellitus without complication, without long-term current use of insulin (H)      Continuous Blood Gluc  (FREESTYLE LEONELA READER) LIMA 1 Device daily  Qty: 1 Device, Refills: 0    Associated Diagnoses: Gestational diabetes requiring insulin      Continuous Blood Gluc Sensor (FREESTYLE LEONELA 14 DAY SENSOR) MISC Change every 14 days. Check fasting blood sugar and 1 hr postmeal blood sugar DAILY.  Qty: 8 each, Refills: 3    Associated Diagnoses: Gestational diabetes requiring insulin      insulin NPH (NOVOLIN N VIAL) 100 UNIT/ML vial INJECT 6 UNITS SUBCUTANEOUS AT AT BEDTIME  Qty: 10 mL, Refills: 11    Associated Diagnoses: Insulin controlled gestational diabetes mellitus (GDM) in first trimester      !! insulin pen needle (32G X 4 MM) 32G X 4 MM miscellaneous Use 4 pen needles daily or as directed.  Qty: 200 each, Refills: 4    Associated Diagnoses: Type 2 diabetes mellitus affecting pregnancy, antepartum      !! insulin pen needle (BD CARINA U/F) 32G X 4 MM miscellaneous Use 1 daily as directed.  Qty: 100 each, Refills: 1    Associated Diagnoses: Type 2 diabetes mellitus without complication, without long-term current use of insulin (H)      !! insulin syringe-needle U-100 (31G X 5/16\" 0.3 ML) 31G X 5/16\" 0.3 ML miscellaneous Use 1 syringes daily or as directed.  Qty: 100 each, Refills: 1    Associated Diagnoses: Type 2 diabetes mellitus without complication, without long-term current use of insulin (H)      !! insulin " "syringe-needle U-100 (BD INSULIN SYRINGE ULTRAFINE) 31G X 5/16\" 0.3 ML miscellaneous Use daily.  Qty: 100 each, Refills: 3    Associated Diagnoses: Insulin controlled gestational diabetes mellitus (GDM) in first trimester      Lancets (ONETOUCH DELICA PLUS [id number]) MISC 100 each by In Vitro route 4 times daily  Qty: 100 each, Refills: prn    Associated Diagnoses: Type 2 diabetes mellitus without complication, without long-term current use of insulin (H)      NOVOLOG FLEXPEN 100 UNIT/ML soln 1-3 units before lunch as directed.  Increase as directed.  Qty: 15 mL, Refills: 0    Associated Diagnoses: Type 2 diabetes mellitus affecting pregnancy, antepartum      ONETOUCH VERIO IQ test strip Use to test blood sugar 4 times daily or as directed.  Ok to substitute alternative if insurance prefers.  Qty: 200 strip, Refills: 3    Associated Diagnoses: Type 2 diabetes mellitus without complication, without long-term current use of insulin (H)      Phenylephrine HCl (AFRIN ALLERGY NA)       Prenatal Vit-Fe Fumarate-FA (PRENATAL MULTIVITAMIN W/IRON) 27-0.8 MG tablet Take 1 tablet by mouth daily  Qty: 90 tablet, Refills: 3    Associated Diagnoses: Supervision of high risk pregnancy, antepartum      Prenatal Vit-Fe Fumarate-FA (PRENATAL VITAMINS PO)        !! - Potential duplicate medications found. Please discuss with provider.            Discharge/Disposition:  Timothy Crowell was discharged to home in stable condition with the following instructions/medications:  1) Call for temperature > 100.4, bright red vaginal bleeding >1 pad an hour x 2 hours, foul smelling vaginal discharge, pain not controlled by usual oral pain meds, persistent nausea and vomiting not controlled on medications, drainage or redness from incision site  2) She declined contraception.  3) For feeding she decided to breastfeed.  4) She was instructed to follow-up with her primary OB in 6 weeks for a routine postpartum visit.  5) Discharge activity:  " No heavy lifting >15 lbs or strenuous activity for 6 weeks, pelvic rest for 6 weeks, no driving or operating machinery while on narcotics.      Arabella Cole MD  Obstetrics & Gynecology, PGY-3  10/21/2020 6:39 AM              Physician Attestation   I, Mya Lancaster, saw and evaluated this patient prior to discharge.  I discussed the patient with the resident/fellow and agree with plan of care as documented in the note.      I personally reviewed vital signs, medications, labs and exam.    I personally spent 15 minutes on discharge activities.    Doing well. Working on getting milk to come in. Using donor milk. Ready for discharge today.   Reviewed precautions.   RTC 6 weeks    Mya Lancaster MD  Date of Service (when I saw the patient): 10/21/20

## 2020-10-19 NOTE — ANESTHESIA POSTPROCEDURE EVALUATION
Anesthesia POST Procedure Evaluation    Patient: Timothy Crowell   MRN:     [id number] Gender:   female   Age:    30 year old :      1990        Preoperative Diagnosis: Breech presentation, single or unspecified fetus [O32.1XX0]   Procedure(s):   SECTION   Postop Comments: No value filed.     Anesthesia Type: MAC, Spinal, Peripheral Nerve Block, For Post-op pain in coordination with surgeon       Disposition: Admission   Postop Pain Control: Uneventful            Sign Out: Well controlled pain   PONV: No   Neuro/Psych: Uneventful            Sign Out: Acceptable/Baseline neuro status   Airway/Respiratory: Uneventful            Sign Out: Acceptable/Baseline resp. status   CV/Hemodynamics: Uneventful            Sign Out: Acceptable CV status   Other NRE: NONE   DID A NON-ROUTINE EVENT OCCUR? No    Event details/Postop Comments:  Patient comfortable, feeling a little clammy. Vitals stable. Moving legs, sensation still a little numb but returning. Denies questions regarding anesthesia.         Last Anesthesia Record Vitals:  CRNA VITALS  10/19/2020 0952 - 10/19/2020 1052      10/19/2020             NIBP:  (!) 77/50    Pulse:  74    NIBP Mean:  65    Ht Rate:  71    SpO2:  99 %          Last PACU Vitals:  Vitals Value Taken Time   /59 10/19/20 1207   Temp 36.8  C (98.2  F) 10/19/20 1035   Pulse 106 10/19/20 1207   Resp 13 10/19/20 1208   SpO2 100 % 10/19/20 1207   Temp src     NIBP     Pulse     SpO2     Resp     Temp     Ht Rate     Temp 2     Vitals shown include unvalidated device data.      Electronically Signed By: Christine Nieto MD, 2020, 2:45 PM

## 2020-10-19 NOTE — PROVIDER NOTIFICATION
10/19/20 1250   Provider Notification   Provider Name/Title Dr Champagne   Method of Notification In Department   Request Evaluate-Remote   Notification Reason Other  (ERAS orders to be d/c'd, pt was not ERAS on admission)

## 2020-10-19 NOTE — BRIEF OP NOTE
[facility] Brief Operative Note    Surgery Date:  10/19/2020  Surgeon:  Vane Champagne MD  Assistants:  Wm Bermudez MD PGY-2      Pre-op Diagnosis:  1. Intrauterine pregnancy at 39w0d     2. Breech presentation     3. T2DM    Post-op Diagnosis:  1. Same      2. Liveborn female infant      3. Uterine atony    Procedure:  Primary low-transverse  section with double layer uterine closure via Pfannenstiel skin incision    Anesthesia: Spinal, TAP block  EBL:  960 mL  IVF:  1000 mL crystalloid  UOP:  300 mL urine at the end of the case  Drains: Godfrey Catheter   Specimens:  Blood gasses, cord blood  Complications: None     Findings:   1. Clear amniotic fluid  2. Liveborn female infant in breech presentation. Apgars 8 at 1 minute & 9 at 5 minutes. Weight pending.  3. Normal uterus, fallopian tubes, and ovaries.   4.      ID Type Source Tests Collected by Time Destination   1 :  Blood, arterial Blood SEE PROVIDERS ORDERS Tania Guardado RN 10/19/2020  9:34 AM    2 :  Blood, venous Blood SEE PROVIDERS ORDERS Tania Guardado RN 10/19/2020  9:34 AM    3 :  Cord blood Blood OR DOCUMENTATION ONLY Tania Guardado RN 10/19/2020  9:34 AM         Wm Bermudez MD MPH  OB/Gyn PGY-2  10/19/20 10:28 AM

## 2020-10-19 NOTE — OR NURSING
Patient delivered via  section at 0912 viable female. NICU present for breech position. Infant stayed with mother in normal  care. Patient recovering well in PACU. Patient denies pain. Lochia WNL. VSS. Patient declines ice chips at this time. Patient declines any information in the clinic about ERAS. ERAS orders placed for post op, patient declines gem morales at this time.

## 2020-10-19 NOTE — ANESTHESIA CARE TRANSFER NOTE
Patient: Timothy Crowell    Procedure(s):   SECTION    Diagnosis: Breech presentation, single or unspecified fetus [O32.1XX0]  Diagnosis Additional Information: No value filed.    Anesthesia Type:   MAC, Spinal, Peripheral Nerve Block, For Post-op pain in coordination with surgeon     Note:  Airway :Room Air  Patient transferred to:PACU  Comments: VSS. Breathing spontaneously at a regular rate with adequate tidal volumes and maintaining O2 sats on room air. Denies nausea or pain. No apparent complications from anesthesia.     Devonte Waldrop MD  Anesthesiology, CA-1Handoff Report: Identifed the Patient, Identified the Reponsible Provider, Reviewed the pertinent medical history, Discussed the surgical course, Reviewed Intra-OP anesthesia mangement and issues during anesthesia, Set expectations for post-procedure period and Allowed opportunity for questions and acknowledgement of understanding      Vitals: (Last set prior to Anesthesia Care Transfer)    CRNA VITALS  10/19/2020 0952 - 10/19/2020 1048      10/19/2020             NIBP:  97/59    Pulse:  74    NIBP Mean:  65    Ht Rate:  71    SpO2:  99 %                Electronically Signed By: Devonte Waldrop MD  2020  10:48 AM

## 2020-10-19 NOTE — ANESTHESIA PROCEDURE NOTES
Peripheral Nerve Block Procedure Note      Staff -   Anesthesiologist:  Christine aLzo MD  Resident/Fellow: Devonte Waldrop MD  Performed By: resident  Procedure performed by resident/CRNA in presence of a teaching physician.    Location: PACU and OB  Procedure Start/Stop TImes:      10/19/2020 10:35 AM     10/19/2020 10:43 AM    patient identified, IV checked, site marked, risks and benefits discussed, informed consent, monitors and equipment checked, pre-op evaluation, at physician/surgeon's request and post-op pain management      Correct Patient: Yes      Correct Position: Yes      Correct Site: Yes      Correct Procedure: Yes      Correct Laterality:  Yes    Site Marked:  Yes  Procedure details:     Procedure:  TAP    ASA:  2    Diagnosis:  C section    Laterality:  Bilateral    Position:  Supine    Sterile Prep: chloraprep      Local skin infiltration:  None    Needle:  Insulated    Needle gauge:  21    Needle length (mm):  110    Ultrasound: Yes      Ultrasound used to identify targeted nerve, plexus, or vascular structure and placed a needle adjacent to it      Permanent Image entered into patiient's record      Abnormal pain on injection: No      Blood Aspirated: No      Paresthesias:  No    Bleeding at site: No      Bolus via:  Needle    Infusion Method:  Single Shot    Complications:  None  Assessment/Narrative:      Routine TAP block, no apparent complications.

## 2020-10-19 NOTE — ANESTHESIA PROCEDURE NOTES
Spinal/LP Procedure Note    Spinal Block      Staff -   Anesthesiologist:  Christine Lazo MD  Resident/Fellow: Devonte Waldrop MD  Performed By: resident  Procedure performed by resident/CRNA in presence of a teaching physician.    Location: OB and OR  Procedure Start/Stop Times:      10/19/2020 8:30 AM     10/19/2020 8:44 AM    patient identified, IV checked, site marked, risks and benefits discussed, informed consent, monitors and equipment checked, pre-op evaluation, at physician/surgeon's request and post-op pain management      Correct Patient: Yes      Correct Position: Yes      Correct Site: Yes      Correct Procedure: Yes      Correct Laterality:  Yes    Site Marked:  Yes  Procedure:     Procedure:  Intrathecal    ASA:  2    Diagnosis:  C section    Position:  Sitting    Sterile Prep: chloraprep      Insertion site:  L3-4    Approach:  Midline    Needle Type:  Suman    Needle gauge (G):  25    Local Skin Infiltration:  1% lidocaine    amount (ml):  2    Needle Length (in):  3.5    Introducer used: Yes      Introducer gauge:  20 G    Attempts:  1    Redirects:  1    CSF:  Clear    Paresthesias:  No    Time injected:  08:43

## 2020-10-19 NOTE — PLAN OF CARE
VSS. Postpartum check WDL. Pain managed with Toradol, Tylenol, and Taps block. Tolerating soup and fruit. No BG was checked since 1113, so a spot check BG done at 1622 (83). Pt did not call for BG check prior to eating dinner at 1830. Reminded pt to call before meals. Urine output via felder WDL; concentrated. Has LR running. Breastfeeding with full assist for latch and positioning.  at bedside and supportive. Continue cares

## 2020-10-19 NOTE — PLAN OF CARE
Data: Timothy Crowell transferred to room 7128 via cart at 1230. Baby transferred via parent's arms.  Action: Receiving unit notified of transfer: Yes. Patient and family notified of room change. Report given to Marilou CHÁVEZ at 1235. Belongings sent to receiving unit. Accompanied by Registered Nurse. Oriented patient to surroundings. Call light within reach. ID bands double-checked with Mague CHÁVEZ.  Response: Patient tolerated transfer and is stable.

## 2020-10-20 LAB
GLUCOSE BLDC GLUCOMTR-MCNC: 108 MG/DL (ref 70–99)
GLUCOSE BLDC GLUCOMTR-MCNC: 91 MG/DL (ref 70–99)
GLUCOSE BLDC GLUCOMTR-MCNC: 94 MG/DL (ref 70–99)
HGB BLD-MCNC: 9.4 G/DL (ref 11.7–15.7)

## 2020-10-20 PROCEDURE — 120N000002 HC R&B MED SURG/OB UMMC

## 2020-10-20 PROCEDURE — 999N001017 HC STATISTIC GLUCOSE BY METER IP

## 2020-10-20 PROCEDURE — 250N000011 HC RX IP 250 OP 636: Performed by: STUDENT IN AN ORGANIZED HEALTH CARE EDUCATION/TRAINING PROGRAM

## 2020-10-20 PROCEDURE — 250N000013 HC RX MED GY IP 250 OP 250 PS 637: Performed by: STUDENT IN AN ORGANIZED HEALTH CARE EDUCATION/TRAINING PROGRAM

## 2020-10-20 PROCEDURE — 85018 HEMOGLOBIN: CPT | Performed by: STUDENT IN AN ORGANIZED HEALTH CARE EDUCATION/TRAINING PROGRAM

## 2020-10-20 PROCEDURE — 36415 COLL VENOUS BLD VENIPUNCTURE: CPT | Performed by: STUDENT IN AN ORGANIZED HEALTH CARE EDUCATION/TRAINING PROGRAM

## 2020-10-20 RX ORDER — FERROUS SULFATE 325(65) MG
325 TABLET ORAL EVERY OTHER DAY
Qty: 45 TABLET | Refills: 0 | Status: SHIPPED | OUTPATIENT
Start: 2020-10-20 | End: 2022-05-19

## 2020-10-20 RX ADMIN — ACETAMINOPHEN 975 MG: 325 TABLET, FILM COATED ORAL at 14:36

## 2020-10-20 RX ADMIN — KETOROLAC TROMETHAMINE 30 MG: 30 INJECTION, SOLUTION INTRAMUSCULAR; INTRAVENOUS at 05:13

## 2020-10-20 RX ADMIN — IBUPROFEN 800 MG: 800 TABLET ORAL at 23:56

## 2020-10-20 RX ADMIN — IBUPROFEN 800 MG: 800 TABLET ORAL at 11:33

## 2020-10-20 RX ADMIN — ACETAMINOPHEN 975 MG: 325 TABLET, FILM COATED ORAL at 21:29

## 2020-10-20 RX ADMIN — ACETAMINOPHEN 975 MG: 325 TABLET, FILM COATED ORAL at 03:26

## 2020-10-20 RX ADMIN — DOCUSATE SODIUM 50 MG AND SENNOSIDES 8.6 MG 2 TABLET: 8.6; 5 TABLET, FILM COATED ORAL at 21:29

## 2020-10-20 RX ADMIN — DOCUSATE SODIUM 50 MG AND SENNOSIDES 8.6 MG 2 TABLET: 8.6; 5 TABLET, FILM COATED ORAL at 09:00

## 2020-10-20 RX ADMIN — IBUPROFEN 800 MG: 800 TABLET ORAL at 17:40

## 2020-10-20 RX ADMIN — ACETAMINOPHEN 975 MG: 325 TABLET, FILM COATED ORAL at 09:00

## 2020-10-20 NOTE — PROGRESS NOTES
Post Partum Progress Note  PPD#1    Subjective:  She complains of feeling dizzy earlier when she got up to use the restroom.  Does not feel dizzy at rest. Pain is improving and well controlled on current medication regimen. She does have some incisional soreness. She is tolerating PO intake. Lochia present and similar to heavy menses. Godfrey in place. She has passed flatus and has not had a BM. She denies headache, changes in vision, nausea/vomiting, chest pain, shortness of breath, RUQ pain, or worsening edema.  Plans to breast feed.    Objective:  Patient Vitals for the past 24 hrs:   BP Temp Temp src Pulse Resp SpO2   10/20/20 0857 98/69 98.9  F (37.2  C) Oral 105 18 --   10/20/20 0513 -- -- -- -- -- 96 %   10/20/20 0500 -- -- -- -- -- 96 %   10/20/20 0410 100/59 98  F (36.7  C) Oral 112 17 97 %   10/20/20 0100 100/61 -- -- 107 -- --   10/20/20 0000 90/52 98.9  F (37.2  C) Oral 108 18 98 %   10/19/20 2100 90/60 98.7  F (37.1  C) Oral 100 17 97 %   10/19/20 1715 92/60 98.7  F (37.1  C) Oral 96 18 98 %   10/19/20 1614 91/51 99  F (37.2  C) Oral 102 18 98 %   10/19/20 1459 94/53 98.1  F (36.7  C) Oral 103 18 98 %   ]    General: NAD, resting comfortably  CV: Regular rate, well perfused.   Pulm: Normal respiratory effort.  Abd: Soft, non-tender, non-distended. Fundus is firm and 1 cm below the umbilicus.    Incision: bandage is in place, no surrounding erythema  Ext: 1+ lower extremity edema bilaterally. No calf tenderness.    Results for TIMOTHY MCCARTNEY (MRN [id number]) as of 10/20/2020 14:21   Ref. Range 10/16/2020 11:08 10/20/2020 07:20   Hemoglobin Latest Ref Range: 11.7 - 15.7 g/dL 12.3 9.4 (L)       Assessment/Plan:  Timothy Mccartney is a 30 year old  female who is POD#1 s/p PLTCS, currently recovering well.    - Encourage routine post-operative goals including ambulation and incentive spirometry  - PNC: Rh pos. Rubella immune. No intervention indicated.  - Pain: controlled on oral  medications  - Heme: Hgb 12.3>>9.4, acute blood loss anemia from surgery, expected and asymptomatic.  Will discharge home with iron.  - GI: continue anti-emetics and stool softeners as needed.  - : Godfrey in place, will remove today.  - Infant: Stable in room  - Feeding: Plans on breastfeeding.  - BC: Will discuss    T2DM  - PTA 8u NPH at bedtime, Novolog 0//5, patient not consistently taking regimen  - MSSI, fasting BG to be drawn this morning    Depression  - Not on medication  - Mood good today  - Mood check at one week PP    Discharge to home on POD#2      Arabella Cole MD  Obstetrics & Gynecology, PGY-3  10/20/2020 6:37 AM    Physician Attestation   I, Charissa Garcia MD, personally examined and evaluated this patient.  I discussed the patient with the resident/fellow and care team, and agree with the assessment and plan of care as documented in the note of 10/20/2020.      I personally reviewed vital signs, medications and labs.    Mccormick findings: Timothy Crowell is a 30 year old  POD#1 s/p PLTCS for breech presentation, currently recovering well.  Godfrey catheter removed this morning.  Will follow up trial of void.  Fasting blood glucose this morning was 94.  Post op Hgb of 9.4, appropriate for surgical blood loss.  No current symptoms of anemia.  Will plan to discharge home with iron supplementation PO.  Charissa Garcia MD  Date of Service (when I saw the patient): 10/20/20

## 2020-10-20 NOTE — PROGRESS NOTES
Post  Anesthesia Follow Up Note    Patient: Timothy Crowell    Chief complaint: Acute postoperative pain managment    Procedure(s) Performed:   section    Anesthesia type: Spinal Block and transversus abdominus plane (TAP) nerve block        Subjective:   The patient reports good pain control.  Pain Intensity: Mild, aching abdominal pain.  Patient endorses some dizziness, worse with standing quickly, but denies weakness, paresthesia, headache, or pain at neuraxial site.  There are no reports of numbness, tingling lips, tinnitus, metallic taste, difficulties breathing nausea, or vomiting. She is able to ambulate. She has voided.         Objective:  Last Vitals: /59   Pulse 112   Temp 36.7  C (98  F) (Oral)   Resp 17   Ht 1.524 m (5')   Wt 67.1 kg (148 lb)   LMP 2020 (Approximate)   SpO2 96%   Breastfeeding Unknown   BMI 28.90 kg/m      Respiratory Function (RR / SpO2 / Airway Patency): Satisfactory    Cardiac Function (HR / Rhythm / BP): Satisfactory    Neurologic: grossly intact    Strength and sensation lower extremities: Strength 5/5 and grossly symmetric bilateral LE      Assessment and Plan:   Timothy Crowell is a 30 year old female POD #1 s/p   section with spinal 150 mcg morphine, 10 mcg fentanyl and hyperbaric bupivacaine 0.75% 1.5 mL intrathecal and single shot bilateral  TAP nerve block injection with 20 mL bupivacaine 0.25% with liposome bupivacaine (Exparel) long-acting 1.3% 20mL given for postoperative analgesia.      She is ambulating with difficulty, no weakness or paresthesias.  No evidence of adverse side effects associated with spinal and nerve block injections. She is receiving adequate incisional pain control.  I anticipate up to 72 hours of incisional pain control, but that the patient will require opioid/nonopioid analgesics for visceral and muscle pain not controlled with local anesthetic.      - NO other local anesthetic use within 96  hours of liposome bupivacaine (Exparel) long acting  - discussed with patient to expect increase in pain with increased activity initially  - please call if questions or concerns  - discussed plan with attending anesthesiologist      Devonte Waldrop MD  Anesthesia Resident  10/20/2020  7:31 AM    If questions or concerns, please contact OB anesthesia resident at [phone] or OB anesthesiologist at [phone] if resident unavailable.

## 2020-10-20 NOTE — CONSULTS
Brief IP Diabetes Note:    Timothy Crowell is a 30 year old   at 39w0d  who was admitted 10/19/2020 for scheduled .  Pregnancy complicated by gestational diabetes.    Was dx with gestational diabetes on insulin and followed by [facility] endocrinology  She did not have a prior history of diabetes                Upon this admission, BG 85  Blood sugars during admission, 87--> 87--> 94  Blood sugars are in good control    Recommendations:  follow up in 6 weeks for repeat glucose tolerance test, and receive annual A1c screening, due to the increased risk of TIIDM development in women with GDM history.      Jose Antonio Clayton, EDWIN  Inpatient Diabetes Service  Pager [phone]  Job code pager [phone]

## 2020-10-20 NOTE — PLAN OF CARE
Data: Vital signs and postpartum checks WDL  Patient eating and drinking normally. Patient able to walk in the room and bathroom with slight dizziness. Godfrey with good urine output and was removed at  0600.  Patient performing self cares and is able to care for infant. Breastfeeding on demand with assist. BG at HS = 87 but did eat well after. PIV saline locked. Instructed to call for help in going to the bathroom due to dizziness a while ago.  Action: Patient medicated during the shift for pain with Tylenol and Toradol with relief after 1 hour. Patient education done see education record.  Response: Positive attachment behaviors observed with infant. Support persons  present.    Plan: Continue with the plan of care

## 2020-10-20 NOTE — PLAN OF CARE
Stable post partum. Pain managed with medication. Up and ambulating without difficulty but mving slowly. Urinating without issue. Breast feeding assistance provided.

## 2020-10-21 VITALS
TEMPERATURE: 97.7 F | RESPIRATION RATE: 20 BRPM | HEIGHT: 60 IN | WEIGHT: 148 LBS | HEART RATE: 95 BPM | OXYGEN SATURATION: 96 % | DIASTOLIC BLOOD PRESSURE: 68 MMHG | SYSTOLIC BLOOD PRESSURE: 105 MMHG | BODY MASS INDEX: 29.06 KG/M2

## 2020-10-21 PROCEDURE — 250N000013 HC RX MED GY IP 250 OP 250 PS 637: Performed by: STUDENT IN AN ORGANIZED HEALTH CARE EDUCATION/TRAINING PROGRAM

## 2020-10-21 RX ADMIN — DOCUSATE SODIUM AND SENNOSIDES 1 TABLET: 8.6; 5 TABLET ORAL at 12:45

## 2020-10-21 RX ADMIN — IBUPROFEN 800 MG: 800 TABLET ORAL at 06:11

## 2020-10-21 RX ADMIN — ACETAMINOPHEN 975 MG: 325 TABLET, FILM COATED ORAL at 12:45

## 2020-10-21 RX ADMIN — IBUPROFEN 800 MG: 800 TABLET ORAL at 12:45

## 2020-10-21 RX ADMIN — ACETAMINOPHEN 975 MG: 325 TABLET, FILM COATED ORAL at 03:40

## 2020-10-21 NOTE — PLAN OF CARE
Data: Vital signs and postpartum checks WDL  Patient eating and drinking normally. Patient able to empty bladder independently and is up ambulating.  Patient performing self cares and is able to care for infant. Breastfeeding on demand. Supplementing baby with Donor milk. Shower done and incision is REBECCA.   Action: Patient medicated during the shift for pain with Tylenol and Ibuprofen with relief after 1 hour. Patient education done see education record.  Response: Positive attachment behaviors observed with infant. Support persons  present.    Plan: Continue with the plan of care

## 2020-10-21 NOTE — PROGRESS NOTES
Post Partum Progress Note  PPD#2    Subjective:  She reports feeling well this morning, improved from yesterday. Pain is improving and well controlled on current medication regimen. Ambulating without issue. She is tolerating PO intake. Lochia present and similar to menses. Voiding without issue. She has passed flatus and has had a small BM. She denies headache, changes in vision, nausea/vomiting, chest pain, shortness of breath, RUQ pain, or worsening edema.  Plans to breast feed. Has had some difficulty latching but has appreciated all the help from nursing staff.     Objective:  Patient Vitals for the past 24 hrs:   BP Temp Temp src Pulse Resp   10/20/20 2356 99/66 98.1  F (36.7  C) Oral 91 17   10/20/20 1700 104/68 98.2  F (36.8  C) Oral 98 18   10/20/20 0857 98/69 98.9  F (37.2  C) Oral 105 18   ]    General: NAD, resting comfortably  CV: Regular rate, well perfused.   Pulm: Normal respiratory effort.  Abd: Soft, non-tender, non-distended. Fundus is firm and 1 cm below the umbilicus.    Incision: incision c/d/i  Ext: 2+ lower extremity edema bilaterally. No calf tenderness.      Assessment/Plan:  Timothy rCowell is a 30 year old  female who is POD#2 s/p PLTCS, currently recovering well.    - Encourage routine post-operative goals including ambulation and incentive spirometry  - PNC: Rh pos. Rubella immune. No intervention indicated.  - Pain: controlled on oral medications  - Heme: Hgb 12.3>>9.4, acute blood loss anemia from surgery, expected and asymptomatic.  Will discharge home with iron.  - GI: continue anti-emetics and stool softeners as needed.  - : Voiding  - Infant: Stable in room  - Feeding: Plans on breastfeeding.  - BC: Declines    T2DM  - PTA 8u NPH at bedtime, Novolog 0//5, patient not consistently taking regimen  - MSSI, fasting BG 94  - Follow up in 6 weeks for GTT. No medications on discharge    Depression  - Not on medication  - Mood good today  - Mood check at one week  PP    Discharge to home on POD#2 pending lactation or POD#3    Arabella Cole MD  Obstetrics & Gynecology, PGY-3  10/21/2020 6:37 AM      Physician Attestation   I, Mya Lancatser, saw and evaluated this patient prior to discharge.  I discussed the patient with the resident/fellow and agree with plan of care as documented in the note.      I personally reviewed vital signs, medications, labs and exam.    I personally spent 15 minutes on discharge activities.    Doing well. Working on getting milk to come in. Using donor milk. Ready for discharge today.   Reviewed precautions.   RTC 6 weeks    Mya Lancaster MD  Date of Service (when I saw the patient): 10/21/20

## 2020-10-21 NOTE — PLAN OF CARE
VSS. Afebrile. Up ad kristina. Voiding and passing gas. C/o some pain and medicated with relief. Breast feeding and doing SNS or finger feeding. Discharge instructions and meds reviewed and given to pt. Will discharge after she eats and they feed baby.

## 2020-10-21 NOTE — CONSULTS
Diabetes CNS Consult  Received consult request to see this 30 year old female for patient education.  Patient admitted 10/19/2020 for scheduled  section.  She was diagnosed with gestational diabetes on insulin and followed by [facility] endocrinology.    Patient seen postpartum by the inpatient diabetes management team.  Her glucoses have been in the 80-90's during admission.    It is recommended that she follow up for a repeat glucose tolerance test, and receive and annual A1c screening ,due to the increased risk of type 2 diabetes.    Will sign off; no new learning needs identified.    Sarah Martínez MS, APRN, CNS, CDE, CDTC  [phone]

## 2020-10-21 NOTE — PLAN OF CARE
VSS.  Alizea WNL.  Pain managed with PO meds.  Breastfeeding with full assist, and hand expressing + pumping.  Showering this murtaza.  Continue to monitor.

## 2020-10-21 NOTE — LACTATION NOTE
"This note was copied from a baby's chart.  Consult for: First time breastfeeding. Family seen 10/20 and 10/21.    Delivery Information: Infant was born at 39.0 weeks via scheduled c/s for breech presentation on 10/19/20 at 0912.     Maternal Health History: Timothy has a history of GDM. She noted some breast growth in early pregnancy. She has been hand expressing and pumping due to infant supplementation. ?    Infant information:  Infant has age appropriate output. Her blood sugars were stable. Her weight loss at 24 hours was -2.7% and at 48 hours her weight was -5.7% of her birthweight at 7lb 3.7oz. Her bili was LIR at 27 hours of age.     Oral exam of baby:  Infant has a higher arched palate. She has a palpable, thick, posterior lingual frenulum. She was visualized extending her tongue and during assessment appears to have appropriate elevation and lateralization.     Feeding Assessment: Family has been feeding infant with a nipple shield and sns of donor milk per parent choice since day of delivery.     10/20: Timothy was assisted with placing the nipple shield and sns system, positioning and latching infant. Infant was able to sustain latch and produce a coordinated suck/swallow pattern. She tolerated supplement of 10ml donor milk at the breast. Timothy was shown how to set up the breast pump parts and operate the pump in the initiate setting.    10/21: Timothy continues to hand express and/or pump with each feeding. She is able to express drops of colostrum. Parents have been trying to latch infant without nipple shield and sns and express frustration in both infant fussiness and the time it takes.   Reviewed shield use with parents and tips to wean from shield. Parents requested support latching without the shield. We attempted for over 25\" to latch without the shield. Infant extremely fussy and coming on and off the breast. Timothy was hand expressing drops of colostrum and mid-attempt infant was spoon fed 1ml of colostrum " to try to calm her down. We were able to latch infant eventually to the left breast but infant fell asleep after sucking for about 1 minute. I reviewed shield use and requested to attempt to latch infant with shield and sns system as that is what she is used to and was extremely fussy during the attempts but parents declined.     Education: early feeding cues, benefits of feeding on cue, breastfeeding positions, signs breastfeeding is going well (comfortable latch, age appropriate output and weight loss, swallowing heard at the breast), satiety cues, expected  output,  weight loss, nutritive vs non-nutritive sucking, benefits of skin to skin, the Second Night, benefits of breast massage and hand expression of colostrum, benefits of pumping, indications for supplementation, supplement methods, pump part cleaning recommendations, Infant Feeding Log handout, inpatient lactation support and outpatient lactation resources.     Plan: Continue breastfeeding on cue with RN support as needed with a goal of 8-12 feedings per day.  Family may attempt to latch infant without shield and sns but were encouraged to use sns/shield if they are unable to get infant latched due to infant fussiness and eventual exhaustion at the breast when attempting to feed without. Infant has been using nipple shield and sns since the first night following delivery.     Hang was encouraged to continue hand expression and pumping until her transitional milk is in and infant is able to latch at the breast.    Family plans to either use donor milk or formula to supplement at home. They were given outpatient donor milk information.     Family plans to follow up at [facility]. They were given the  Breastfeeding Resource List and encouraged to follow up with an outpatient LC within 1 week of discharge due to early feeding concerns and nipple shield use.

## 2020-10-26 ENCOUNTER — VIRTUAL VISIT (OUTPATIENT)
Dept: OBGYN | Facility: CLINIC | Age: 30
End: 2020-10-26
Payer: COMMERCIAL

## 2020-10-26 PROCEDURE — 99212 OFFICE O/P EST SF 10 MIN: CPT | Mod: 24 | Performed by: OBSTETRICS & GYNECOLOGY

## 2020-10-26 ASSESSMENT — PAIN SCALES - GENERAL: PAINLEVEL: NO PAIN (0)

## 2020-10-26 NOTE — PROGRESS NOTES
"Timothy Crowell is a 30 year old female who is being evaluated via a billable telephone visit.      The patient has been notified of following:     \"This telephone visit will be conducted via a call between you and your physician/provider. We have found that certain health care needs can be provided without the need for a physical exam.  This service lets us provide the care you need with a short phone conversation.  If a prescription is necessary we can send it directly to your pharmacy.  If lab work is needed we can place an order for that and you can then stop by our lab to have the test done at a later time.    Telephone visits are billed at different rates depending on your insurance coverage. During this emergency period, for some insurers they may be billed the same as an in-person visit.  Please reach out to your insurance provider with any questions.    If during the course of the call the physician/provider feels a telephone visit is not appropriate, you will not be charged for this service.\"    Patient has given verbal consent for Telephone visit?  Yes    What phone number would you like to be contacted at? [phone]    How would you like to obtain your AVS? Elfego    Phone call duration: 8 minutes      Additional notes:  Timothy presents for telephone follow-up for mood check postpartum.     # 1 - Date: 10/19/20, Sex: Female, Weight: 3.48 kg (7 lb 10.8 oz), GA: 39w0d, Delivery: , Low Transverse, Apgar1: 8, Apgar5: 9, Living: Living, Birth Comments: None    Has history of depression/anxiety. Not on medications.   Has been doing well overall.   Acknowledges some labile mood and anxiety but feels well supported. Her  is home with her and their baby.   Breastfeeding is improving, supply is now adequate. Her  is helping her get some sleep.   She feels her worrisome thoughts are fleeing, and she is able to let them go and move on. Overall she feels she is doing well and will " "follow-up in clinic in 5 weeks. She will reach out if she needs anything prior to that.     RORY-7 SCORE 10/27/2020   Total Score 10 (moderate anxiety)   Total Score 10       PHQ 8/17/2020 9/2/2020 10/27/2020   PHQ-9 Total Score 10 12 8   Q9: Thoughts of better off dead/self-harm past 2 weeks Not at all Not at all Several days   F/U: Thoughts of suicide or self-harm - - No   F/U: Safety concerns - - No     Recovering well from surgery. Only taking ibuprofen and tylenol.    (O90.6) Postpartum mood disturbance  (primary encounter diagnosis)  Comment:   Plan:   Discussed differences between \"baby blues\" and postpartum mood disorders.   Timothy feels her symptoms are hormonal and manageable and she has good support.   Reviewed precautions for calling or sending [employer] to clinic for assistance.         Mya Lancaster MD    "

## 2020-11-06 ENCOUNTER — MYC MEDICAL ADVICE (OUTPATIENT)
Dept: OBGYN | Facility: CLINIC | Age: 30
End: 2020-11-06

## 2020-11-09 NOTE — TELEPHONE ENCOUNTER
Given a post-partum fever and headache, as well as active suicidal ideation, she needs to be evaluated in the ED.    Isabella Huerta MD

## 2020-11-09 NOTE — TELEPHONE ENCOUNTER
Patient delivered via CS on 10/19/2020. She has had a fever for the past 3 days. Temps have been 102-103 degrees. States she does not have other COVID symptoms. After the fever breaks she feels sweaty and drained of energy with a headache. Taking tylenol and Ibuprofen which helps bring down the fever.  She attached a picture of her incision and it appears to be healing well. Having latching difficulties, so breastfeeds just a couple of times a day, otherwise is pumping. At first patient only c/o of mild nipple soreness this morning, but now says that both breasts are very hard, hot,and painful. I can help give her some pointers on ways to help with milk movement, but please advise on fever and symptoms. Shalonda Orona RN

## 2020-11-10 NOTE — PROGRESS NOTES
"Timothy Crowell is a 30 year old female who is being evaluated via a billable telephone visit.      The patient has been notified of following:     \"This telephone visit will be conducted via a call between you and your physician/provider. We have found that certain health care needs can be provided without the need for a physical exam.  This service lets us provide the care you need with a short phone conversation.  If a prescription is necessary we can send it directly to your pharmacy.  If lab work is needed we can place an order for that and you can then stop by our lab to have the test done at a later time.    Telephone visits are billed at different rates depending on your insurance coverage. During this emergency period, for some insurers they may be billed the same as an in-person visit.  Please reach out to your insurance provider with any questions.    If during the course of the call the physician/provider feels a telephone visit is not appropriate, you will not be charged for this service.\"    Patient has given verbal consent for Telephone visit?  Yes    What phone number would you like to be contacted at? [phone]    How would you like to obtain your AVS? Elfego Lomas MA    Outcome for 11/10/20 10:41 AM :Glucose sent via Email    Due to the COVID 19 pandemic this visit was converted to a telephone visit in order to help prevent spread of infection in this patient and the general population.    Time of start: 10:00 am  Time of end: 10:14 am  Total duration of telephone visit: 14 minutes.    HPI  Timothy Crowell is a 30 year old female with hx of gestational diabetes. Telephone visit today for diabetes follow up.   No previous hx of diabetes.   Timothy delivered a health baby girl on 10/19/2020 at [id number].  Baby was delivered via ( breech presentation) and baby's weight was 7.6 lbs.  No complications for baby or patient.  Timothy is not requiring any insulin at this time.  I " reviewed her Freestyle Celia sensor download data today and her average glucose is 89.  Pt's A1C was 5.0 % on 2020. She is anemic.  On ROS today,she is fatigued getting up with the baby during the night.  She is in good spirits today.    On ROS today, she tells me she has been taking Tylenol 2-3 times daily for a fever. She states she had a temp for 5 days 101-103 range.  No fever this am-she took Tylenol.  Hang states she had a URI which is resolving.  She denies SOB or cough and state her  incision does not hurt and no drainage at this site. Her breast are painful. She is pumping.  I asked her to call her OB today re: fevers and breast pain.  She denies headaches, n/v, chest pain, abd pain or diarrhea. No dysuria.    ROS  Please see under HPI.    Allergies  Allergies   Allergen Reactions     Seasonal Allergies        Medications  Current Outpatient Medications   Medication Sig Dispense Refill     acetaminophen (TYLENOL) 325 MG tablet Take 2 tablets (650 mg) by mouth every 6 hours as needed for mild pain Start after Delivery. 100 tablet 0     Blood Glucose Monitoring Suppl (ONETOUCH VERIO FLEX SYSTEM) w/Device KIT 1 each 4 times daily 1 kit 0     Continuous Blood Gluc  (FREESTYLE CELIA READER) LIMA 1 Device daily 1 Device 0     Continuous Blood Gluc Sensor (FREESTYLE CELIA 14 DAY SENSOR) MISC Change every 14 days. Check fasting blood sugar and 1 hr postmeal blood sugar DAILY. 8 each 3     ferrous sulfate (FEROSUL) 325 (65 Fe) MG tablet Take 1 tablet (325 mg) by mouth every other day 45 tablet 0     ibuprofen (ADVIL/MOTRIN) 600 MG tablet Take 1 tablet (600 mg) by mouth every 6 hours as needed for moderate pain Start after delivery 60 tablet 0     Lancets (ONETOUCH DELICA PLUS [id number]) MISC 100 each by In Vitro route 4 times daily 100 each prn     ONETOUCH VERIO IQ test strip Use to test blood sugar 4 times daily or as directed.  Ok to substitute alternative if insurance prefers. 200 strip 3      Phenylephrine HCl (AFRIN ALLERGY NA)        Prenatal Vit-Fe Fumarate-FA (PRENATAL VITAMINS PO)        senna-docusate (SENOKOT-S/PERICOLACE) 8.6-50 MG tablet Take 1 tablet by mouth daily Start after delivery. 100 tablet 0       Family History  family history includes Cerebrovascular Disease in her father; Hyperlipidemia in her sister; Liver Cancer in her mother.    Social History   reports that she has never smoked. She has never used smokeless tobacco. She reports previous alcohol use. She reports that she does not use drugs.     .  Baby girl born 10/19/2020.    Past Medical History  None.    Physical Exam    No exam today.    RESULTS  Creatinine   Date Value Ref Range Status   06/02/2020 0.47 (L) 0.52 - 1.04 mg/dL Final     GFR Estimate   Date Value Ref Range Status   06/02/2020 >90 >60 mL/min/[1.73_m2] Final     Comment:     Non  GFR Calc  Starting 12/18/2018, serum creatinine based estimated GFR (eGFR) will be   calculated using the Chronic Kidney Disease Epidemiology Collaboration   (CKD-EPI) equation.       Hemoglobin A1C   Date Value Ref Range Status   07/22/2020 5.0 0 - 5.6 % Final     Comment:     Normal <5.7% Prediabetes 5.7-6.4%  Diabetes 6.5% or higher - adopted from ADA   consensus guidelines.       ALT   Date Value Ref Range Status   06/02/2020 16 0 - 50 U/L Final     AST   Date Value Ref Range Status   06/02/2020 11 0 - 45 U/L Final     TSH   Date Value Ref Range Status   06/25/2020 1.51 0.40 - 4.00 mU/L Final       ASSESSMENT/PLAN:    1.  GESTATIONAL DIABETES: Blood sugar readings are good postpartum.  Will check A1C and advised pt to have an A1C done annually since she is at risk for developing type 2 diabetes.  No need for insulin at this time.  Discussed the importance of healthy eating and to remain active.    2.  FEVER: Fever taking Tylenol. I asked to call her OB staff regarding her fevers and breast pain today.    3.  FOLLOW UP: annual A1C  .

## 2020-11-11 ENCOUNTER — VIRTUAL VISIT (OUTPATIENT)
Dept: ENDOCRINOLOGY | Facility: CLINIC | Age: 30
End: 2020-11-11
Payer: COMMERCIAL

## 2020-11-11 DIAGNOSIS — O24.414 INSULIN CONTROLLED GESTATIONAL DIABETES MELLITUS (GDM) IN THIRD TRIMESTER: Primary | ICD-10-CM

## 2020-11-11 PROCEDURE — 99213 OFFICE O/P EST LOW 20 MIN: CPT | Mod: 95 | Performed by: PHYSICIAN ASSISTANT

## 2020-11-11 NOTE — LETTER
"11/11/2020       RE: Timothy Crowell  [address]  [facility] [address]     Dear Colleague,    Thank you for referring your patient, Timothy Crowell, to the [facility] [place] [facility]. Please see a copy of my visit note below.    Timothy Crowell is a 30 year old female who is being evaluated via a billable telephone visit.      The patient has been notified of following:     \"This telephone visit will be conducted via a call between you and your physician/provider. We have found that certain health care needs can be provided without the need for a physical exam.  This service lets us provide the care you need with a short phone conversation.  If a prescription is necessary we can send it directly to your pharmacy.  If lab work is needed we can place an order for that and you can then stop by our lab to have the test done at a later time.    Telephone visits are billed at different rates depending on your insurance coverage. During this emergency period, for some insurers they may be billed the same as an in-person visit.  Please reach out to your insurance provider with any questions.    If during the course of the call the physician/provider feels a telephone visit is not appropriate, you will not be charged for this service.\"    Patient has given verbal consent for Telephone visit?  Yes    What phone number would you like to be contacted at? [phone]    How would you like to obtain your AVS? Elfego Lomas MA    Outcome for 11/10/20 10:41 AM :Glucose sent via Email    Due to the COVID 19 pandemic this visit was converted to a telephone visit in order to help prevent spread of infection in this patient and the general population.    Time of start: 10:00 am  Time of end: 10:14 am  Total duration of telephone visit: 14 minutes.    HPI  Timothy Crowell is a 30 year old female with hx of " gestational diabetes. Telephone visit today for diabetes follow up.   No previous hx of diabetes.   Timothy delivered a health baby girl on 10/19/2020 at [id number].  Baby was delivered via ( breech presentation) and baby's weight was 7.6 lbs.  No complications for baby or patient.  Timothy is not requiring any insulin at this time.  I reviewed her Freestyle Celia sensor download data today and her average glucose is 89.  Pt's A1C was 5.0 % on 2020. She is anemic.  On ROS today,she is fatigued getting up with the baby during the night.  She is in good spirits today.    On ROS today, she tells me she has been taking Tylenol 2-3 times daily for a fever. She states she had a temp for 5 days 101-103 range.  No fever this am-she took Tylenol.  Timothy states she had a URI which is resolving.  She denies SOB or cough and state her  incision does not hurt and no drainage at this site. Her breast are painful. She is pumping.  I asked her to call her OB today re: fevers and breast pain.  She denies headaches, n/v, chest pain, abd pain or diarrhea. No dysuria.    ROS  Please see under HPI.    Allergies  Allergies   Allergen Reactions     Seasonal Allergies        Medications  Current Outpatient Medications   Medication Sig Dispense Refill     acetaminophen (TYLENOL) 325 MG tablet Take 2 tablets (650 mg) by mouth every 6 hours as needed for mild pain Start after Delivery. 100 tablet 0     Blood Glucose Monitoring Suppl (ONETOUCH VERIO FLEX SYSTEM) w/Device KIT 1 each 4 times daily 1 kit 0     Continuous Blood Gluc  (FREESTYLE CELIA READER) LIMA 1 Device daily 1 Device 0     Continuous Blood Gluc Sensor (FREESTYLE CELIA 14 DAY SENSOR) MISC Change every 14 days. Check fasting blood sugar and 1 hr postmeal blood sugar DAILY. 8 each 3     ferrous sulfate (FEROSUL) 325 (65 Fe) MG tablet Take 1 tablet (325 mg) by mouth every other day 45 tablet 0     ibuprofen (ADVIL/MOTRIN) 600 MG tablet Take 1 tablet (600 mg)  by mouth every 6 hours as needed for moderate pain Start after delivery 60 tablet 0     Lancets (ONETOUCH DELICA PLUS [id number]) MISC 100 each by In Vitro route 4 times daily 100 each prn     ONETOUCH VERIO IQ test strip Use to test blood sugar 4 times daily or as directed.  Ok to substitute alternative if insurance prefers. 200 strip 3     Phenylephrine HCl (AFRIN ALLERGY NA)        Prenatal Vit-Fe Fumarate-FA (PRENATAL VITAMINS PO)        senna-docusate (SENOKOT-S/PERICOLACE) 8.6-50 MG tablet Take 1 tablet by mouth daily Start after delivery. 100 tablet 0       Family History  family history includes Cerebrovascular Disease in her father; Hyperlipidemia in her sister; Liver Cancer in her mother.    Social History   reports that she has never smoked. She has never used smokeless tobacco. She reports previous alcohol use. She reports that she does not use drugs.     .  Baby girl born 10/19/2020.    Past Medical History  None.    Physical Exam    No exam today.    RESULTS  Creatinine   Date Value Ref Range Status   06/02/2020 0.47 (L) 0.52 - 1.04 mg/dL Final     GFR Estimate   Date Value Ref Range Status   06/02/2020 >90 >60 mL/min/[1.73_m2] Final     Comment:     Non  GFR Calc  Starting 12/18/2018, serum creatinine based estimated GFR (eGFR) will be   calculated using the Chronic Kidney Disease Epidemiology Collaboration   (CKD-EPI) equation.       Hemoglobin A1C   Date Value Ref Range Status   07/22/2020 5.0 0 - 5.6 % Final     Comment:     Normal <5.7% Prediabetes 5.7-6.4%  Diabetes 6.5% or higher - adopted from ADA   consensus guidelines.       ALT   Date Value Ref Range Status   06/02/2020 16 0 - 50 U/L Final     AST   Date Value Ref Range Status   06/02/2020 11 0 - 45 U/L Final     TSH   Date Value Ref Range Status   06/25/2020 1.51 0.40 - 4.00 mU/L Final       ASSESSMENT/PLAN:    1.  GESTATIONAL DIABETES: Blood sugar readings are good postpartum.  Will check A1C and advised pt to have an  A1C done annually since she is at risk for developing type 2 diabetes.  No need for insulin at this time.  Discussed the importance of healthy eating and to remain active.    2.  FEVER: Fever taking Tylenol. I asked to call her OB staff regarding her fevers and breast pain today.    3.  FOLLOW UP: annual A1C .      Alma Salamanca PA-C

## 2020-11-11 NOTE — TELEPHONE ENCOUNTER
Can you please reach out to patient and see how she's doing?  I'm concerned based on the messages she sent on Monday.    Thanks,  Isabella Huerta MD

## 2020-11-12 NOTE — TELEPHONE ENCOUNTER
Pt called back. Her fever went down and she did not go to the ER. Pt did not want to talk with a nurse at that time, she only wanted to let everyone know she was fine.

## 2020-11-17 ENCOUNTER — TELEPHONE (OUTPATIENT)
Dept: OBGYN | Facility: CLINIC | Age: 30
End: 2020-11-17

## 2020-11-17 NOTE — TELEPHONE ENCOUNTER
Attempted to call pt regarding last week's symptoms of fever and URI, and notify her that she would not be able to be seen in person in clinic due to potential Covid infection.  No answer:  Left message stating concern regarding some symptoms she had reported, and need to change to telephone visit.  Will send further information via [employer].

## 2020-11-23 ENCOUNTER — MEDICAL CORRESPONDENCE (OUTPATIENT)
Dept: HEALTH INFORMATION MANAGEMENT | Facility: CLINIC | Age: 30
End: 2020-11-23

## 2020-11-29 NOTE — PROGRESS NOTES
OB GYN CLINIC VISIT    CC: postpartum visit    SUBJECTIVE:  Timothy Crowell is a 30 year old female  here for a postpartum visit.  She had a  Section  on 2020 delivering a healthy baby girl weighing 7 lbs 10 oz at 39w0d.      Pregnancy complicated by breech presentation as well as early diagnosis of GDM on insulin.  Blood sugars were normal postpartum and diabetes team discontinued her insulin.  Recommended annual A1c due to increased risk of type 2 diabetes.    Doing fairly well since her delivery, but sometimes feels overwhelmed and very tired.  Her  does what he can to help out, although she is often exhausted.  This exhaustion does sometimes make it harder for her to complete daily tasks, although she reports things are getting better.  Denies any suicidal or homicidal ideation.    Did have a few episodes of elevated temperature previously, but has not had any problems recently.  Thinks it was due to a clogged milk duct.  Was able to massage and use warm compresses and things improved.  Did not present for care during this time or receive any antibiotics.    Sometimes feels a little dizzy if she stands up too quickly.  Denies any loss of consciousness.    PHQ-9 SCORE 2020 2020 10/27/2020   PHQ-9 Total Score MyChart - - 8 (Mild depression)   PHQ-9 Total Score 10 12 8     RORY-7 SCORE 10/27/2020   Total Score 10 (moderate anxiety)   Total Score 10         delivery complications:  No  breast feeding:  Yes, no problems other than white spot on right nipple  bladder problems:  No  bowel problems/hemorrhoids:  No  episiotomy/laceration/incision healed? Yes: No concerns  vaginal flow:  None  [place]:  No  contraception:  condoms  emotional adjustment:  doing well, happy, tired and having some difficulties adjusting  back to work: Thinking may be in February    Current Outpatient Medications   Medication     acetaminophen (TYLENOL) 325 MG tablet     Blood Glucose Monitoring  Suppl (ONETOUCH VERIO FLEX SYSTEM) w/Device KIT     Continuous Blood Gluc  (FREESTYLE LEONELA READER) LIMA     Continuous Blood Gluc Sensor (FREESTYLE LEONELA 14 DAY SENSOR) MISC     ferrous sulfate (FEROSUL) 325 (65 Fe) MG tablet     ibuprofen (ADVIL/MOTRIN) 600 MG tablet     Lancets (ONETOUCH DELICA PLUS [id number]) MISC     ONETOUCH VERIO IQ test strip     Phenylephrine HCl (AFRIN ALLERGY NA)     Prenatal Vit-Fe Fumarate-FA (PRENATAL VITAMINS PO)     senna-docusate (SENOKOT-S/PERICOLACE) 8.6-50 MG tablet     No current facility-administered medications for this visit.        OBJECTIVE:  Blood pressure 98/68, pulse 57, weight 55.3 kg (122 lb), last menstrual period 2020, SpO2 100 %, currently breastfeeding.   General - pleasant female in no acute distress.  Breast - no nodularity bilaterally.  Small milk bleb on right nipple.  Otherwise normal breast exam  Abdomen - soft, nontender, nondistended, no hepatosplenomegaly.  Pelvic - EG: normal adult female, BUS: within normal limits, Vagina: well rugated, no discharge, Cervix: normal, no lesions or CMT, Uterus: firm, normal sized and nontender, Adnexae: no masses or tenderness.  Pap smear obtained  Rectovaginal - deferred.    ASSESSMENT:  30 year old  with normal postpartum exam    PLAN:    1. Encounter for postpartum visit  May resume normal activities without restrictions  The patient will use condoms for birth control.   Discussed recommended interpregnancy interval of at least 12 months, but ideally 18 months.  However, patient is not planning on any more children at this time.  - Breast Pump DME    2. Gestational diabetes requiring insulin  Order released from last endocrine appointment at patient request  - Hemoglobin A1c    3. Dizziness  will check blood counts due to dizziness, although I suspect orthostatic hypotension.  Discussed importance of adequate hydration  - CBC with platelets    4.  Postpartum depression  Patient seems to be  struggling a bit postpartum, but does report good support at home.  Discussed the options of starting medication such as Zoloft, and or talking with behavioral health clinician in clinic.  Again, denies suicidal or homicidal ideation patient reports she feels like things are getting better at this time, so declines these options.  I will reach out to her via [employer] next week, and I encouraged her to reach out before then if she has any concerns.      5.  Screening for malignant neoplasm of cervix  Pap smear was done today.   - Pap imaged thin layer screen with HPV - recommended age 30 - 65 years (select HPV order below)  - HPV High Risk Types DNA Cervical      Full counseling was provided, and all questions answered. Compliance is strongly emphasized.    Return to clinic in one year for an annual, when due for a pap smear or PRN.    Isabella Huerta MD

## 2020-11-30 ENCOUNTER — PRENATAL OFFICE VISIT (OUTPATIENT)
Dept: OBGYN | Facility: CLINIC | Age: 30
End: 2020-11-30
Payer: COMMERCIAL

## 2020-11-30 DIAGNOSIS — Z12.4 SCREENING FOR MALIGNANT NEOPLASM OF CERVIX: ICD-10-CM

## 2020-11-30 DIAGNOSIS — R42 DIZZINESS: ICD-10-CM

## 2020-11-30 DIAGNOSIS — O24.414 GESTATIONAL DIABETES REQUIRING INSULIN: ICD-10-CM

## 2020-11-30 LAB
ERYTHROCYTE [DISTWIDTH] IN BLOOD BY AUTOMATED COUNT: 14.8 % (ref 10–15)
HBA1C MFR BLD: 5.4 % (ref 0–5.6)
HCT VFR BLD AUTO: 37.6 % (ref 35–47)
HGB BLD-MCNC: 12 G/DL (ref 11.7–15.7)
MCH RBC QN AUTO: 29.5 PG (ref 26.5–33)
MCHC RBC AUTO-ENTMCNC: 31.9 G/DL (ref 31.5–36.5)
MCV RBC AUTO: 92 FL (ref 78–100)
PLATELET # BLD AUTO: 266 10E9/L (ref 150–450)
RBC # BLD AUTO: 4.07 10E12/L (ref 3.8–5.2)
WBC # BLD AUTO: 6.6 10E9/L (ref 4–11)

## 2020-11-30 PROCEDURE — 85027 COMPLETE CBC AUTOMATED: CPT | Performed by: OBSTETRICS & GYNECOLOGY

## 2020-11-30 PROCEDURE — 87624 HPV HI-RISK TYP POOLED RSLT: CPT | Performed by: OBSTETRICS & GYNECOLOGY

## 2020-11-30 PROCEDURE — 99213 OFFICE O/P EST LOW 20 MIN: CPT | Performed by: OBSTETRICS & GYNECOLOGY

## 2020-11-30 PROCEDURE — 99207 PR POST PARTUM EXAM: CPT | Performed by: OBSTETRICS & GYNECOLOGY

## 2020-11-30 PROCEDURE — G0145 SCR C/V CYTO,THINLAYER,RESCR: HCPCS | Performed by: OBSTETRICS & GYNECOLOGY

## 2020-11-30 PROCEDURE — 36415 COLL VENOUS BLD VENIPUNCTURE: CPT | Performed by: OBSTETRICS & GYNECOLOGY

## 2020-11-30 PROCEDURE — 83036 HEMOGLOBIN GLYCOSYLATED A1C: CPT | Performed by: OBSTETRICS & GYNECOLOGY

## 2020-11-30 NOTE — LETTER
December 1, 2020      Timothy Crowell  [address]  [facility] [address]        Dear Timothy:    I hope you and your family are well.    Below is a copy of your recent A1C result.    Your A1C is normal at 5.4 %.    You should have your A1C blood test done once a year.    Keep up the good work!!    I can be reached via [employer] or by phone at [phone] if you have any questions.    Take care and stay safe.    Sincerely,  Alma Salamanca PA-C     Resulted Orders   Hemoglobin A1c   Result Value Ref Range    Hemoglobin A1C 5.4 0 - 5.6 %      Comment:      Normal <5.7% Prediabetes 5.7-6.4%  Diabetes 6.5% or higher - adopted from ADA   consensus guidelines.             Alma Salamanca PA-C

## 2020-12-02 VITALS
HEART RATE: 57 BPM | WEIGHT: 122 LBS | SYSTOLIC BLOOD PRESSURE: 98 MMHG | DIASTOLIC BLOOD PRESSURE: 68 MMHG | OXYGEN SATURATION: 100 % | BODY MASS INDEX: 23.83 KG/M2

## 2020-12-02 LAB
COPATH REPORT: NORMAL
PAP: NORMAL

## 2020-12-04 LAB
FINAL DIAGNOSIS: NORMAL
HPV HR 12 DNA CVX QL NAA+PROBE: NEGATIVE
HPV16 DNA SPEC QL NAA+PROBE: NEGATIVE
HPV18 DNA SPEC QL NAA+PROBE: NEGATIVE
SPECIMEN DESCRIPTION: NORMAL
SPECIMEN SOURCE CVX/VAG CYTO: NORMAL

## 2021-02-04 ENCOUNTER — TELEPHONE (OUTPATIENT)
Dept: OBGYN | Facility: CLINIC | Age: 31
End: 2021-02-04

## 2021-03-03 ENCOUNTER — MEDICAL CORRESPONDENCE (OUTPATIENT)
Dept: HEALTH INFORMATION MANAGEMENT | Facility: CLINIC | Age: 31
End: 2021-03-03

## 2021-03-07 ENCOUNTER — HEALTH MAINTENANCE LETTER (OUTPATIENT)
Age: 31
End: 2021-03-07

## 2021-04-20 ENCOUNTER — MEDICAL CORRESPONDENCE (OUTPATIENT)
Dept: HEALTH INFORMATION MANAGEMENT | Facility: CLINIC | Age: 31
End: 2021-04-20

## 2021-04-25 ENCOUNTER — HEALTH MAINTENANCE LETTER (OUTPATIENT)
Age: 31
End: 2021-04-25

## 2021-05-25 ENCOUNTER — TELEPHONE (OUTPATIENT)
Dept: FAMILY MEDICINE | Facility: CLINIC | Age: 31
End: 2021-05-25

## 2021-05-25 NOTE — TELEPHONE ENCOUNTER
Patient Quality Outreach      Summary:    Patient has the following on her problem list/HM:   Diabetes    Last A1C:  Lab Results   Component Value Date    A1C 5.4 11/30/2020    A1C 5.0 07/22/2020       Last LDL:    No results found for: LDL    Is the patient on a Statin? No          Is the patient on Aspirin? No        Last three blood pressure readings:  BP Readings from Last 3 Encounters:   11/30/20 98/68   10/21/20 105/68   10/13/20 104/65            Tobacco Use      Smoking status: Never Smoker      Smokeless tobacco: Never Used          Patient is due/failing the following:   A1C, Eye Exam and Microalbumin and Adult/Adolescent physical, date due: 2/26/21    Type of outreach:    Sent [employer] message.    Questions for provider review:    None                                                                                                                                     Kelly Collins MA on 5/25/2021 at 1:43 PM       Chart routed to .

## 2021-06-20 ENCOUNTER — HEALTH MAINTENANCE LETTER (OUTPATIENT)
Age: 31
End: 2021-06-20

## 2021-10-10 ENCOUNTER — HEALTH MAINTENANCE LETTER (OUTPATIENT)
Age: 31
End: 2021-10-10

## 2022-01-27 ENCOUNTER — MYC MEDICAL ADVICE (OUTPATIENT)
Dept: FAMILY MEDICINE | Facility: CLINIC | Age: 32
End: 2022-01-27
Payer: COMMERCIAL

## 2022-01-30 ENCOUNTER — HEALTH MAINTENANCE LETTER (OUTPATIENT)
Age: 32
End: 2022-01-30

## 2022-02-15 NOTE — TELEPHONE ENCOUNTER
Type of surgery: ob  Location of surgery: [facility]  Date and time of surgery: 10/19/20 830a  Surgeon: Ihsan  Pre-Op Appt Date: surgeon to do  Post-Op Appt Date: 10/26/20   Packet sent out: Yes  Pre-cert/Authorization completed:  No  Date: 10/14/20  Sharon Loev, Surgery Coordinator        Assessment  DMT2: 71y Male with DM T2 with hyperglycemia, A1C 6.4%, was on insulin at home, now on insulin coverage only, blood sugars are stable and trending within acceptable range, no hypoglycemias. Patient on tube feeds, has dropping hgb, planning endoscopy tomorrow.  Hypothyroidism: Patient has no history thyroid disease, was not on any thyroid supplements, subclinical with low-normal FT4 and lethargy, on synthroid 12.5 mcg IV daily, repeat TFTs show subclinical state, increased to synthroid 20mcg IV.  Hypercalcemia: Started on Sensipar 60mg daily, Ca improving.  CHF: on medications, stable, monitored.  HTN: Controlled,  on antihypertensive medications.  Parkinsons: on meds, monitored.  CKD: Monitor labs/BMP       Assessment  DMT2: 71y Male with DM T2 with hyperglycemia, A1C 6.4%, was on insulin at home, now on insulin coverage only, blood sugars are stable and trending within acceptable range,  no hypoglycemias. Patient on tube feeds, has dropping hgb, planning endoscopy tomorrow.  Hypothyroidism: Patient has no history thyroid disease, was not on any thyroid supplements, subclinical with low-normal FT4 and lethargy, on synthroid 12.5 mcg IV daily, repeat TFTs show subclinical state, increased to synthroid 20mcg IV.  Hypercalcemia: Started on Sensipar 60mg daily, Ca improving.  CHF: on medications, stable, monitored.  HTN: Controlled,  on antihypertensive medications.  Parkinsons: on meds, monitored.  CKD: Monitor labs/BMP

## 2022-02-19 NOTE — PLAN OF CARE
Patient tolerated food and fluid, ambulated to bathroom and was able to void.  Discharged to home and will follow up in clinic.   
Pt and , Reinaldo here for scheduled external version to be under spinal anesthesia. Admission completed. Consent signed per Dr Lancaster. Anesthesia notified. Reactive NST.  
Pt to OR 3 ambulatory at 1344. Seated for spinal, Terbutaline 0.25 mg given subcutaneous at 1345. , Christiano escorted in and remained for version. Version attempted, nsussesseful. Fetal monitor applied immediately after, FHT's initially 90's for 90 seconds, pt tilted left and 's with moderate variability. Pt transferred back to room 454 for recovery. Bedside report given to KYLER Sher.  
No

## 2022-05-19 ENCOUNTER — OFFICE VISIT (OUTPATIENT)
Dept: FAMILY MEDICINE | Facility: CLINIC | Age: 32
End: 2022-05-19
Payer: COMMERCIAL

## 2022-05-19 VITALS
DIASTOLIC BLOOD PRESSURE: 71 MMHG | SYSTOLIC BLOOD PRESSURE: 105 MMHG | HEART RATE: 83 BPM | OXYGEN SATURATION: 98 % | HEIGHT: 60 IN | BODY MASS INDEX: 26.31 KG/M2 | WEIGHT: 134 LBS | TEMPERATURE: 98.2 F

## 2022-05-19 DIAGNOSIS — J30.2 SEASONAL ALLERGIC RHINITIS, UNSPECIFIED TRIGGER: ICD-10-CM

## 2022-05-19 DIAGNOSIS — R73.03 PREDIABETES: ICD-10-CM

## 2022-05-19 DIAGNOSIS — Z13.220 LIPID SCREENING: ICD-10-CM

## 2022-05-19 DIAGNOSIS — Z00.00 ROUTINE GENERAL MEDICAL EXAMINATION AT A HEALTH CARE FACILITY: Primary | ICD-10-CM

## 2022-05-19 DIAGNOSIS — Z11.59 NEED FOR HEPATITIS C SCREENING TEST: ICD-10-CM

## 2022-05-19 PROBLEM — O24.419 GESTATIONAL DIABETES: Status: ACTIVE | Noted: 2022-05-19

## 2022-05-19 PROBLEM — O24.419 GESTATIONAL DIABETES: Status: RESOLVED | Noted: 2022-05-19 | Resolved: 2022-05-19

## 2022-05-19 PROBLEM — E11.9 TYPE 2 DIABETES MELLITUS WITHOUT COMPLICATION, WITHOUT LONG-TERM CURRENT USE OF INSULIN (H): Status: RESOLVED | Noted: 2020-03-31 | Resolved: 2022-05-19

## 2022-05-19 LAB
HBA1C MFR BLD: 6.2 % (ref 0–5.6)
HCV AB SERPL QL IA: NONREACTIVE

## 2022-05-19 PROCEDURE — 99395 PREV VISIT EST AGE 18-39: CPT | Performed by: FAMILY MEDICINE

## 2022-05-19 PROCEDURE — 99213 OFFICE O/P EST LOW 20 MIN: CPT | Mod: 25 | Performed by: FAMILY MEDICINE

## 2022-05-19 PROCEDURE — 83036 HEMOGLOBIN GLYCOSYLATED A1C: CPT | Performed by: FAMILY MEDICINE

## 2022-05-19 PROCEDURE — 86803 HEPATITIS C AB TEST: CPT | Performed by: FAMILY MEDICINE

## 2022-05-19 PROCEDURE — 36415 COLL VENOUS BLD VENIPUNCTURE: CPT | Performed by: FAMILY MEDICINE

## 2022-05-19 PROCEDURE — 80061 LIPID PANEL: CPT | Performed by: FAMILY MEDICINE

## 2022-05-19 RX ORDER — FLUTICASONE PROPIONATE 50 MCG
1 SPRAY, SUSPENSION (ML) NASAL DAILY
Qty: 11.1 ML | Refills: 3 | Status: SHIPPED | OUTPATIENT
Start: 2022-05-19

## 2022-05-19 ASSESSMENT — ENCOUNTER SYMPTOMS
FREQUENCY: 0
EYE PAIN: 0
ABDOMINAL PAIN: 0
PALPITATIONS: 0
HEMATURIA: 0
BREAST MASS: 0
ARTHRALGIAS: 0
DIZZINESS: 0
JOINT SWELLING: 0
HEARTBURN: 0
SORE THROAT: 0
SHORTNESS OF BREATH: 0
CHILLS: 0
PARESTHESIAS: 0
NAUSEA: 0
NERVOUS/ANXIOUS: 0
DYSURIA: 0
HEMATOCHEZIA: 0
DIARRHEA: 0
FEVER: 0
CONSTIPATION: 0
MYALGIAS: 0
COUGH: 0
WEAKNESS: 0
HEADACHES: 0

## 2022-05-19 NOTE — PROGRESS NOTES
SUBJECTIVE:   CC: Timothy Crowell is an 32 year old woman who presents for preventive health visit.     {Healthy Habits:     Getting at least 3 servings of Calcium per day:  Yes    Bi-annual eye exam:  Yes    Dental care twice a year:  NO    Sleep apnea or symptoms of sleep apnea:  Excessive snoring    Diet:  Regular (no restrictions)    Frequency of exercise:  2-3 days/week    Duration of exercise:  Less than 15 minutes    Taking medications regularly:  Yes    PHQ-2 Total Score: 2    Additional concerns today:  Yes    Other concerns:    History of seasonal allergies. Currently breast feeding and is worried about taking medication for this. Has tried Afrin in the past that helps intermittently. Notes has tried antihistamines years ago but seemed ineffective. Interested in seeing specialist to discuss further evaluation and treatment.    Hx of gestational diabetes during pregnancy. Has not followed up for diabetes testing post-partum. Notes was prediabetic prior to pregnancy.     Today's PHQ-2 Score:   PHQ-2 ( 1999 Pfizer) 5/19/2022   Q1: Little interest or pleasure in doing things 1   Q2: Feeling down, depressed or hopeless 1   PHQ-2 Score 2   PHQ-2 Total Score (12-17 Years)- Positive if 3 or more points; Administer PHQ-A if positive -   Q1: Little interest or pleasure in doing things Several days   Q2: Feeling down, depressed or hopeless Several days   PHQ-2 Score 2       Abuse: Current or Past (Physical, Sexual or Emotional) - No  Do you feel safe in your environment? Yes    Social History     Tobacco Use     Smoking status: Never Smoker     Smokeless tobacco: Never Used   Substance Use Topics     Alcohol use: Not Currently     If you drink alcohol do you typically have >3 drinks per day or >7 drinks per week? No    Alcohol Use 5/19/2022   Prescreen: >3 drinks/day or >7 drinks/week? Not Applicable   Prescreen: >3 drinks/day or >7 drinks/week? -   No flowsheet data found.    Breast Cancer  Screening:    Breast CA Risk Assessment (FHS-7) 5/19/2022   Do you have a family history of breast, colon, or ovarian cancer? No / Unknown       Patient under 40 years of age: Routine Mammogram Screening not recommended.   Pertinent mammograms are reviewed under the imaging tab.    History of abnormal Pap smear: NO - age 30-65 PAP every 5 years with negative HPV co-testing recommended  PAP / HPV Latest Ref Rng & Units 11/30/2020   PAP (Historical) - NIL   HPV16 NEG:Negative Negative   HPV18 NEG:Negative Negative   HRHPV NEG:Negative Negative     Reviewed and updated as needed this visit by clinical staff   Tobacco  Allergies  Meds   Med Hx  Surg Hx   Soc Hx          Reviewed and updated as needed this visit by Provider     Meds                 Review of Systems   Constitutional: Negative for chills and fever.   HENT: Positive for congestion. Negative for ear pain, hearing loss and sore throat.    Eyes: Negative for pain and visual disturbance.   Respiratory: Negative for cough and shortness of breath.    Cardiovascular: Negative for chest pain, palpitations and peripheral edema.   Gastrointestinal: Negative for abdominal pain, constipation, diarrhea, heartburn, hematochezia and nausea.   Breasts:  Negative for tenderness, breast mass and discharge.   Genitourinary: Negative for dysuria, frequency, genital sores, hematuria, pelvic pain, urgency, vaginal bleeding and vaginal discharge.   Musculoskeletal: Negative for arthralgias, joint swelling and myalgias.   Skin: Negative for rash.   Neurological: Negative for dizziness, weakness, headaches and paresthesias.   Psychiatric/Behavioral: Positive for mood changes. The patient is not nervous/anxious.      OBJECTIVE:   /71 (BP Location: Right arm, Patient Position: Sitting, Cuff Size: Adult Regular)   Pulse 83   Temp 98.2  F (36.8  C) (Temporal)   Ht 1.524 m (5')   Wt 60.8 kg (134 lb)   LMP 05/07/2022   SpO2 98%   BMI 26.17 kg/m    Physical  Exam  GENERAL: healthy, alert and no distress  EYES: Eyes grossly normal to inspection, PERRL and conjunctivae and sclerae normal  HENT: nose and mouth without ulcers or lesions  NECK: no adenopathy, no asymmetry, masses, or scars and thyroid normal to palpation  RESP: lungs clear to auscultation - no rales, rhonchi or wheezes  CV: regular rate and rhythm, normal S1 S2, no S3 or S4, no murmur, click or rub, no peripheral edema and peripheral pulses strong  ABDOMEN: soft, nontender, no hepatosplenomegaly, no masses and bowel sounds normal  MS: no gross musculoskeletal defects noted, no edema  SKIN: no suspicious lesions or rashes  NEURO: Normal strength and tone, mentation intact and speech normal  PSYCH: mentation appears normal, affect normal/bright    ASSESSMENT/PLAN:   Timothy was seen today for physical.    Diagnoses and all orders for this visit:    Routine general medical examination at a health care facility  -     REVIEW OF HEALTH MAINTENANCE PROTOCOL ORDERS    Lipid screening  -     Lipid Profile    Need for hepatitis C screening test  -     Hepatitis C Screen Reflex to HCV RNA Quant and Genotype    Prediabetes  -     Hemoglobin A1c    Seasonal allergic rhinitis, unspecified trigger  -Ongoing for years. Discussed trying flonase nasal spray  -Referral to allergy to discuss further eval and treatment options  -     fluticasone (FLONASE) 50 MCG/ACT nasal spray; Spray 1 spray into both nostrils daily  -     Adult Allergy/Asthma Referral; Future    COUNSELING:  Reviewed preventive health counseling, as reflected in patient instructions       Regular exercise       Healthy diet/nutrition    Estimated body mass index is 26.17 kg/m  as calculated from the following:    Height as of this encounter: 1.524 m (5').    Weight as of this encounter: 60.8 kg (134 lb).    She reports that she has never smoked. She has never used smokeless tobacco.      Jeff Merrill DO  [facility]

## 2022-05-20 LAB
CHOLEST SERPL-MCNC: 295 MG/DL
FASTING STATUS PATIENT QL REPORTED: YES
HDLC SERPL-MCNC: 37 MG/DL
LDLC SERPL CALC-MCNC: 193 MG/DL
NONHDLC SERPL-MCNC: 258 MG/DL
TRIGL SERPL-MCNC: 325 MG/DL

## 2022-05-22 ENCOUNTER — MYC MEDICAL ADVICE (OUTPATIENT)
Dept: FAMILY MEDICINE | Facility: CLINIC | Age: 32
End: 2022-05-22
Payer: COMMERCIAL

## 2022-05-24 NOTE — TELEPHONE ENCOUNTER
Yes, I do think that your high cholesterol could be due to genetics. Why don't we set up a virtual visit to discuss this more?    Jeff Merrill, DO

## 2022-08-01 ENCOUNTER — MYC MEDICAL ADVICE (OUTPATIENT)
Dept: EDUCATION SERVICES | Facility: CLINIC | Age: 32
End: 2022-08-01

## 2022-08-01 ENCOUNTER — LAB REQUISITION (OUTPATIENT)
Dept: LAB | Facility: CLINIC | Age: 32
End: 2022-08-01

## 2022-08-01 PROCEDURE — 86481 TB AG RESPONSE T-CELL SUSP: CPT | Performed by: INTERNAL MEDICINE

## 2022-08-03 LAB
GAMMA INTERFERON BACKGROUND BLD IA-ACNC: 0.34 IU/ML
M TB IFN-G BLD-IMP: NEGATIVE
M TB IFN-G CD4+ BCKGRND COR BLD-ACNC: 9.66 IU/ML
MITOGEN IGNF BCKGRD COR BLD-ACNC: -0.08 IU/ML
MITOGEN IGNF BCKGRD COR BLD-ACNC: -0.09 IU/ML
QUANTIFERON MITOGEN: 10 IU/ML
QUANTIFERON NIL TUBE: 0.34 IU/ML
QUANTIFERON TB1 TUBE: 0.25 IU/ML
QUANTIFERON TB2 TUBE: 0.26

## 2022-09-18 ENCOUNTER — HEALTH MAINTENANCE LETTER (OUTPATIENT)
Age: 32
End: 2022-09-18

## 2023-01-29 ENCOUNTER — HEALTH MAINTENANCE LETTER (OUTPATIENT)
Age: 33
End: 2023-01-29

## 2023-03-14 ASSESSMENT — ENCOUNTER SYMPTOMS
PARESTHESIAS: 0
FEVER: 0
SORE THROAT: 0
ABDOMINAL PAIN: 0
BREAST MASS: 0
MYALGIAS: 1
EYE PAIN: 0
SHORTNESS OF BREATH: 1
DIZZINESS: 0
DYSURIA: 0
FREQUENCY: 0
JOINT SWELLING: 0
WEAKNESS: 0
HEARTBURN: 0
HEMATOCHEZIA: 0
PALPITATIONS: 1
CONSTIPATION: 0
HEADACHES: 1
NAUSEA: 1
HEMATURIA: 0
NERVOUS/ANXIOUS: 1
ARTHRALGIAS: 0
COUGH: 0
CHILLS: 0
DIARRHEA: 0

## 2023-03-14 ASSESSMENT — PATIENT HEALTH QUESTIONNAIRE - PHQ9
SUM OF ALL RESPONSES TO PHQ QUESTIONS 1-9: 11
SUM OF ALL RESPONSES TO PHQ QUESTIONS 1-9: 11
10. IF YOU CHECKED OFF ANY PROBLEMS, HOW DIFFICULT HAVE THESE PROBLEMS MADE IT FOR YOU TO DO YOUR WORK, TAKE CARE OF THINGS AT HOME, OR GET ALONG WITH OTHER PEOPLE: VERY DIFFICULT

## 2023-03-21 ENCOUNTER — OFFICE VISIT (OUTPATIENT)
Dept: FAMILY MEDICINE | Facility: CLINIC | Age: 33
End: 2023-03-21
Payer: COMMERCIAL

## 2023-03-21 VITALS
HEART RATE: 81 BPM | BODY MASS INDEX: 28.39 KG/M2 | TEMPERATURE: 98.4 F | SYSTOLIC BLOOD PRESSURE: 108 MMHG | DIASTOLIC BLOOD PRESSURE: 70 MMHG | WEIGHT: 144.6 LBS | OXYGEN SATURATION: 98 % | RESPIRATION RATE: 17 BRPM | HEIGHT: 60 IN

## 2023-03-21 DIAGNOSIS — E78.5 HYPERLIPIDEMIA LDL GOAL <160: ICD-10-CM

## 2023-03-21 DIAGNOSIS — R73.03 PREDIABETES: ICD-10-CM

## 2023-03-21 DIAGNOSIS — Z00.00 ROUTINE GENERAL MEDICAL EXAMINATION AT A HEALTH CARE FACILITY: Primary | ICD-10-CM

## 2023-03-21 DIAGNOSIS — F41.1 GENERALIZED ANXIETY DISORDER: ICD-10-CM

## 2023-03-21 DIAGNOSIS — N92.0 MENORRHAGIA WITH REGULAR CYCLE: ICD-10-CM

## 2023-03-21 DIAGNOSIS — G44.52 NEW PERSISTENT DAILY HEADACHE: ICD-10-CM

## 2023-03-21 DIAGNOSIS — Z13.29 SCREENING FOR THYROID DISORDER: ICD-10-CM

## 2023-03-21 DIAGNOSIS — R63.5 WEIGHT GAIN: ICD-10-CM

## 2023-03-21 DIAGNOSIS — F32.81 PREMENSTRUAL DYSPHORIC DISORDER: ICD-10-CM

## 2023-03-21 DIAGNOSIS — Z23 ENCOUNTER FOR VACCINATION: ICD-10-CM

## 2023-03-21 DIAGNOSIS — E55.9 VITAMIN D DEFICIENCY: ICD-10-CM

## 2023-03-21 LAB
CHOLEST SERPL-MCNC: 336 MG/DL
ERYTHROCYTE [DISTWIDTH] IN BLOOD BY AUTOMATED COUNT: 11.8 % (ref 10–15)
FERRITIN SERPL-MCNC: 253 NG/ML (ref 6–175)
HBA1C MFR BLD: 6.9 % (ref 0–5.6)
HCT VFR BLD AUTO: 39.3 % (ref 35–47)
HDLC SERPL-MCNC: 34 MG/DL
HGB BLD-MCNC: 13.3 G/DL (ref 11.7–15.7)
IRON BINDING CAPACITY (ROCHE): 348 UG/DL (ref 240–430)
IRON SATN MFR SERPL: 21 % (ref 15–46)
IRON SERPL-MCNC: 73 UG/DL (ref 37–145)
LDLC SERPL CALC-MCNC: ABNORMAL MG/DL
MCH RBC QN AUTO: 29.5 PG (ref 26.5–33)
MCHC RBC AUTO-ENTMCNC: 33.8 G/DL (ref 31.5–36.5)
MCV RBC AUTO: 87 FL (ref 78–100)
NONHDLC SERPL-MCNC: 302 MG/DL
PLATELET # BLD AUTO: 264 10E3/UL (ref 150–450)
RBC # BLD AUTO: 4.51 10E6/UL (ref 3.8–5.2)
TRIGL SERPL-MCNC: 663 MG/DL
TSH SERPL DL<=0.005 MIU/L-ACNC: 1.43 UIU/ML (ref 0.3–4.2)
WBC # BLD AUTO: 7.9 10E3/UL (ref 4–11)

## 2023-03-21 PROCEDURE — 80061 LIPID PANEL: CPT | Performed by: FAMILY MEDICINE

## 2023-03-21 PROCEDURE — 82728 ASSAY OF FERRITIN: CPT | Performed by: FAMILY MEDICINE

## 2023-03-21 PROCEDURE — 84443 ASSAY THYROID STIM HORMONE: CPT | Performed by: FAMILY MEDICINE

## 2023-03-21 PROCEDURE — 36415 COLL VENOUS BLD VENIPUNCTURE: CPT | Performed by: FAMILY MEDICINE

## 2023-03-21 PROCEDURE — 83036 HEMOGLOBIN GLYCOSYLATED A1C: CPT | Performed by: FAMILY MEDICINE

## 2023-03-21 PROCEDURE — 83540 ASSAY OF IRON: CPT | Performed by: FAMILY MEDICINE

## 2023-03-21 PROCEDURE — 91313 COVID-19 VACCINE BIVALENT BOOSTER 18+ (MODERNA): CPT | Performed by: FAMILY MEDICINE

## 2023-03-21 PROCEDURE — 82306 VITAMIN D 25 HYDROXY: CPT | Performed by: FAMILY MEDICINE

## 2023-03-21 PROCEDURE — 0134A COVID-19 VACCINE BIVALENT BOOSTER 18+ (MODERNA): CPT | Performed by: FAMILY MEDICINE

## 2023-03-21 PROCEDURE — 83550 IRON BINDING TEST: CPT | Performed by: FAMILY MEDICINE

## 2023-03-21 PROCEDURE — 99395 PREV VISIT EST AGE 18-39: CPT | Mod: 25 | Performed by: FAMILY MEDICINE

## 2023-03-21 PROCEDURE — 96127 BRIEF EMOTIONAL/BEHAV ASSMT: CPT | Performed by: FAMILY MEDICINE

## 2023-03-21 PROCEDURE — 85027 COMPLETE CBC AUTOMATED: CPT | Performed by: FAMILY MEDICINE

## 2023-03-21 PROCEDURE — 99214 OFFICE O/P EST MOD 30 MIN: CPT | Mod: 25 | Performed by: FAMILY MEDICINE

## 2023-03-21 RX ORDER — SERTRALINE HYDROCHLORIDE 25 MG/1
25 TABLET, FILM COATED ORAL DAILY
Qty: 60 TABLET | Refills: 0 | Status: SHIPPED | OUTPATIENT
Start: 2023-03-21 | End: 2024-02-19

## 2023-03-21 ASSESSMENT — ENCOUNTER SYMPTOMS
PALPITATIONS: 1
BREAST MASS: 0
DYSURIA: 0
PARESTHESIAS: 0
SHORTNESS OF BREATH: 1
FREQUENCY: 0
JOINT SWELLING: 0
DIZZINESS: 0
MYALGIAS: 1
DIARRHEA: 0
NERVOUS/ANXIOUS: 1
ARTHRALGIAS: 0
ABDOMINAL PAIN: 0
FEVER: 0
COUGH: 0
CHILLS: 0
HEARTBURN: 0
HEADACHES: 1
CONSTIPATION: 0
EYE PAIN: 0
NAUSEA: 1
WEAKNESS: 0
SORE THROAT: 0
HEMATOCHEZIA: 0
HEMATURIA: 0

## 2023-03-21 NOTE — PROGRESS NOTES
SUBJECTIVE:   CC: Timothy is an 32 year old who presents for preventive health visit.   Patient has been advised of split billing requirements and indicates understanding: Yes  Healthy Habits:     Getting at least 3 servings of Calcium per day:  Yes    Bi-annual eye exam:  Yes    Dental care twice a year:  Yes    Sleep apnea or symptoms of sleep apnea:  Daytime drowsiness and Excessive snoring    Diet:  Regular (no restrictions)    Frequency of exercise:  2-3 days/week    Duration of exercise:  15-30 minutes    Taking medications regularly:  Not Applicable    Medication side effects:  Not applicable    PHQ-2 Total Score: 2    Additional concerns today:  Yes    Other concerns:  Vegetarian diet, walks 30 mins a day. Still eats eggs and dairy.    Depressed recently. Feeling very sad about life. Happens before 1 week period. Upset about everything. Anxiety.    Headache last week. Behind head. Better with sleeping without a pillow. Worse with coughing and moving. Taking 1000 mg Tylenol at time, with relief. Hx of near vision, but does not wear glasses thus has blurry vision. Last eye exam was bout 1 year. 7.5/10. Headache starts in the am.     Hx of heavy periods. 3 days of heavy periods. Last few months have been heavy.      2 years and 4 months since baby. Had had trouble with weight gain. Unable to lose weight despite healthier lifestyle.    Wt Readings from Last 4 Encounters:   03/21/23 65.6 kg (144 lb 9.6 oz)   05/19/22 60.8 kg (134 lb)   11/30/20 55.3 kg (122 lb)   10/19/20 67.1 kg (148 lb)       PHQ 9/2/2020 10/27/2020 3/14/2023   PHQ-9 Total Score 12 8 11   Q9: Thoughts of better off dead/self-harm past 2 weeks Not at all Several days Several days   F/U: Thoughts of suicide or self-harm - No Yes   F/U: Self harm-plan - - No   F/U: Self-harm action - - No   F/U: Safety concerns - No No       Today's PHQ-2 Score:   PHQ-2 ( 1999 Pfizer) 3/14/2023   Q1: Little interest or pleasure in doing things 1   Q2: Feeling  down, depressed or hopeless 1   PHQ-2 Score 2   PHQ-2 Total Score (12-17 Years)- Positive if 3 or more points; Administer PHQ-A if positive -   Q1: Little interest or pleasure in doing things More than half the days   Q2: Feeling down, depressed or hopeless Several days   PHQ-2 Score 3       Have you ever done Advance Care Planning? (For example, a Health Directive, POLST, or a discussion with a medical provider or your loved ones about your wishes): No, advance care planning information given to patient to review.  Patient declined advance care planning discussion at this time.    Social History     Tobacco Use     Smoking status: Never     Smokeless tobacco: Never   Substance Use Topics     Alcohol use: Not Currently         Alcohol Use 3/14/2023   Prescreen: >3 drinks/day or >7 drinks/week? Not Applicable       Reviewed orders with patient.  Reviewed health maintenance and updated orders accordingly - Yes      Breast Cancer Screening:    Breast CA Risk Assessment (FHS-7) 5/19/2022   Do you have a family history of breast, colon, or ovarian cancer? No / Unknown         Patient under 40 years of age: Routine Mammogram Screening not recommended.   Pertinent mammograms are reviewed under the imaging tab.    History of abnormal Pap smear: NO - age 30-65 PAP every 5 years with negative HPV co-testing recommended  PAP / HPV Latest Ref Rng & Units 11/30/2020   PAP (Historical) - NIL   HPV16 NEG:Negative Negative   HPV18 NEG:Negative Negative   HRHPV NEG:Negative Negative     Reviewed and updated as needed this visit by clinical staff    Allergies  Meds              Reviewed and updated as needed this visit by Provider                     Review of Systems   Constitutional: Negative for chills and fever.   HENT: Negative for congestion, ear pain and sore throat.    Eyes: Negative for pain and visual disturbance.   Respiratory: Positive for shortness of breath. Negative for cough.    Cardiovascular: Positive for  palpitations. Negative for chest pain and peripheral edema.   Gastrointestinal: Positive for nausea. Negative for abdominal pain, constipation, diarrhea, heartburn and hematochezia.   Breasts:  Negative for tenderness, breast mass and discharge.   Genitourinary: Negative for dysuria, frequency, genital sores, hematuria, pelvic pain, urgency, vaginal bleeding and vaginal discharge.   Musculoskeletal: Positive for myalgias. Negative for arthralgias and joint swelling.   Skin: Negative for rash.   Neurological: Positive for headaches. Negative for dizziness, weakness and paresthesias.   Psychiatric/Behavioral: Positive for mood changes. The patient is nervous/anxious.           OBJECTIVE:   /70 (BP Location: Right arm, Patient Position: Sitting, Cuff Size: Adult Regular)   Pulse 81   Temp 98.4  F (36.9  C) (Temporal)   Resp 17   Ht 1.524 m (5')   Wt 65.6 kg (144 lb 9.6 oz)   LMP 03/10/2023 (Approximate)   SpO2 98%   BMI 28.24 kg/m    Physical Exam  GENERAL: healthy, alert and no distress  EYES: Eyes grossly normal to inspection, PERRL and conjunctivae and sclerae normal  HENT:  nose and mouth without ulcers or lesions  NECK: no adenopathy, no asymmetry, masses, or scars and thyroid normal to palpation  RESP: lungs clear to auscultation - no rales, rhonchi or wheezes  CV: regular rate and rhythm, normal S1 S2, no S3 or S4, no murmur, click or rub, no peripheral edema and peripheral pulses strong  ABDOMEN: soft, nontender, no hepatosplenomegaly, no masses and bowel sounds normal  MS: no gross musculoskeletal defects noted, no edema  SKIN: no suspicious lesions or rashes  NEURO: Normal strength and tone, mentation intact and speech normal  PSYCH: mentation appears normal, affect normal/bright        ASSESSMENT/PLAN:   Timothy was seen today for physical.    Diagnoses and all orders for this visit:    Routine general medical examination at a health care facility  -     REVIEW OF HEALTH MAINTENANCE PROTOCOL  ORDERS    Encounter for vaccination  -     COVID-19 VACCINE BIVALENT BOOSTER 18+ (MODERNA)    New persistent daily headache  -Discussed using acetaminophen more regularly to stop headache  -Advised ED if severe 10/10 headache  -Follow-up if persistent symptoms    Prediabetes  -Has changed diet to vegetarian diet and increased physical activity  -     Hemoglobin A1c    Hyperlipidemia LDL goal <160  -Has changed diet to vegetarian diet and increased physical activity  -     Lipid Profile    Premenstrual dysphoric disorder  Generalized anxiety disorder  -Discussed selective serotonin reuptake inhibitor to help with symptoms, pt amendable to trying medication. Reviewed side effects.  -Follow-up in 4 weeks as scheduled for monitoring  -     sertraline (ZOLOFT) 25 MG tablet; Take 1 tablet (25 mg) by mouth daily Start with 1/2 tab daily for first 7 days, then one tab daily thereafter    Menorrhagia with regular cycle  -Heavier cycles recently, discussed checking for iron def  -     CBC with platelets  -     Ferritin  -     Iron and iron binding capacity    Vitamin D deficiency  -     Vitamin D Deficiency    Screening for thyroid disorder  -     TSH with free T4 reflex    Weight gain  -Encouraged continued healthy lifestyle  -Will check TSH and vitamin D levels    COUNSELING:  Reviewed preventive health counseling, as reflected in patient instructions       Regular exercise       Healthy diet/nutrition      BMI:   Estimated body mass index is 28.24 kg/m  as calculated from the following:    Height as of this encounter: 1.524 m (5').    Weight as of this encounter: 65.6 kg (144 lb 9.6 oz).   Weight management plan: Discussed healthy diet and exercise guidelines      She reports that she has never smoked. She has never used smokeless tobacco.          Jeff Merrill DO  [facility]

## 2023-03-22 DIAGNOSIS — F41.1 GENERALIZED ANXIETY DISORDER: ICD-10-CM

## 2023-03-22 DIAGNOSIS — F32.81 PREMENSTRUAL DYSPHORIC DISORDER: ICD-10-CM

## 2023-03-22 LAB — DEPRECATED CALCIDIOL+CALCIFEROL SERPL-MC: 13 UG/L (ref 20–75)

## 2023-03-23 RX ORDER — SERTRALINE HYDROCHLORIDE 25 MG/1
TABLET, FILM COATED ORAL
Qty: 60 TABLET | Refills: 0 | OUTPATIENT
Start: 2023-03-23

## 2023-04-27 ENCOUNTER — VIRTUAL VISIT (OUTPATIENT)
Dept: FAMILY MEDICINE | Facility: CLINIC | Age: 33
End: 2023-04-27
Payer: COMMERCIAL

## 2023-04-27 DIAGNOSIS — F34.1 DYSTHYMIA: ICD-10-CM

## 2023-04-27 DIAGNOSIS — E11.65 TYPE 2 DIABETES MELLITUS WITH HYPERGLYCEMIA, WITHOUT LONG-TERM CURRENT USE OF INSULIN (H): Primary | ICD-10-CM

## 2023-04-27 DIAGNOSIS — E78.5 DYSLIPIDEMIA: ICD-10-CM

## 2023-04-27 DIAGNOSIS — E55.9 VITAMIN D DEFICIENCY: ICD-10-CM

## 2023-04-27 PROBLEM — E11.9 DIABETES MELLITUS, TYPE 2 (H): Status: ACTIVE | Noted: 2023-04-27

## 2023-04-27 PROCEDURE — 99214 OFFICE O/P EST MOD 30 MIN: CPT | Mod: VID | Performed by: FAMILY MEDICINE

## 2023-04-27 RX ORDER — LANCETS
EACH MISCELLANEOUS
Qty: 1 EACH | Refills: 3 | Status: SHIPPED | OUTPATIENT
Start: 2023-04-27

## 2023-04-27 RX ORDER — GLUCOSAMINE HCL/CHONDROITIN SU 500-400 MG
CAPSULE ORAL
Qty: 100 EACH | Refills: 3 | Status: SHIPPED | OUTPATIENT
Start: 2023-04-27

## 2023-04-27 RX ORDER — ROSUVASTATIN CALCIUM 5 MG/1
5 TABLET, COATED ORAL DAILY
Qty: 90 TABLET | Refills: 1 | Status: SHIPPED | OUTPATIENT
Start: 2023-04-27 | End: 2024-02-19 | Stop reason: SINTOL

## 2023-04-27 ASSESSMENT — ANXIETY QUESTIONNAIRES
GAD7 TOTAL SCORE: 2
3. WORRYING TOO MUCH ABOUT DIFFERENT THINGS: SEVERAL DAYS
IF YOU CHECKED OFF ANY PROBLEMS ON THIS QUESTIONNAIRE, HOW DIFFICULT HAVE THESE PROBLEMS MADE IT FOR YOU TO DO YOUR WORK, TAKE CARE OF THINGS AT HOME, OR GET ALONG WITH OTHER PEOPLE: SOMEWHAT DIFFICULT
GAD7 TOTAL SCORE: 2
7. FEELING AFRAID AS IF SOMETHING AWFUL MIGHT HAPPEN: NOT AT ALL
2. NOT BEING ABLE TO STOP OR CONTROL WORRYING: NOT AT ALL
1. FEELING NERVOUS, ANXIOUS, OR ON EDGE: SEVERAL DAYS
7. FEELING AFRAID AS IF SOMETHING AWFUL MIGHT HAPPEN: NOT AT ALL
5. BEING SO RESTLESS THAT IT IS HARD TO SIT STILL: NOT AT ALL
8. IF YOU CHECKED OFF ANY PROBLEMS, HOW DIFFICULT HAVE THESE MADE IT FOR YOU TO DO YOUR WORK, TAKE CARE OF THINGS AT HOME, OR GET ALONG WITH OTHER PEOPLE?: SOMEWHAT DIFFICULT
GAD7 TOTAL SCORE: 2
6. BECOMING EASILY ANNOYED OR IRRITABLE: NOT AT ALL
4. TROUBLE RELAXING: NOT AT ALL

## 2023-04-27 ASSESSMENT — PATIENT HEALTH QUESTIONNAIRE - PHQ9
10. IF YOU CHECKED OFF ANY PROBLEMS, HOW DIFFICULT HAVE THESE PROBLEMS MADE IT FOR YOU TO DO YOUR WORK, TAKE CARE OF THINGS AT HOME, OR GET ALONG WITH OTHER PEOPLE: SOMEWHAT DIFFICULT
SUM OF ALL RESPONSES TO PHQ QUESTIONS 1-9: 7
SUM OF ALL RESPONSES TO PHQ QUESTIONS 1-9: 7

## 2023-04-27 NOTE — PROGRESS NOTES
Timothy is a 32 year old who is being evaluated via a billable video visit.      How would you like to obtain your AVS? MyChart  If the video visit is dropped, the invitation should be resent by: Text to cell phone: [phone]  Will anyone else be joining your video visit? No          Assessment & Plan     Type 2 diabetes mellitus with hyperglycemia, without long-term current use of insulin (H)  -Discussed starting lifestyle and dietary changes. Provided pt with handouts as well. Recommended diabetic educator.  -BS testing supplies sent, pt has monitor. Advised to monitor am fasting BS daily. Bring long to next visit.  -For now pt will work on lifestyle modifications, we will reevaluate in 6 weeks as schedule. Goal is to stay off DM2 medication.  - rosuvastatin (CRESTOR) 5 MG tablet  Dispense: 90 tablet; Refill: 1  - blood glucose (NO BRAND SPECIFIED) test strip  Dispense: 100 strip; Refill: 6  - thin (NO BRAND SPECIFIED) lancets  Dispense: 1 each; Refill: 3  - alcohol swab prep pads  Dispense: 100 each; Refill: 3  - AMB Adult Diabetes Educator Referral    Dyslipidemia  -Discussed dyslipidemia. Triglycerides high due to DM2/hyperglycemia  -LDL high prior  -Discussed starting rosuvastatin, side effects discussed    Vitamin D deficiency  -Continue otc vitamin d  -Recheck levels next visit    Dysthymia  -Stable  -Pt will consider selective serotonin reuptake inhibitor in future      36 minutes spent by me on the date of the encounter doing chart review, history and exam, documentation and further activities per the note.      DO LONNIE Cortes [facility]    Subjective   Timothy is a 32 year old, presenting for the following health issues:  Depression (Labs F/U)    DM2 and dyslipidemia.  Had gestational DM2. Newly diagnosed DM2. Has glucose monitor at home from pregnancy. Wonders how often she can check BS. Consumes vegetarian diet but also eats fish, eggs, and dairy products.     Mom had hx of  "liver issues and liver cancer. Family hx of high cholesterol. Would like to start cholesterol lowering medication.    Started vitamin D3 supplement.    Did not start sertraline do concerns about side effects. Still feels sad sometimes but is able to modify her behaviors to improve mood. Did see therapist at school, but unhelpful.          10/27/2020     7:18 PM 4/27/2023     8:07 AM   RORY-7 SCORE   Total Score 10 (moderate anxiety) 2 (minimal anxiety)   Total Score 10 2         10/27/2020     7:17 PM 3/14/2023    12:40 PM 4/27/2023     8:06 AM   PHQ   PHQ-9 Total Score 8 11 7   Q9: Thoughts of better off dead/self-harm past 2 weeks Several days Several days Not at all   F/U: Thoughts of suicide or self-harm No Yes    F/U: Self harm-plan  No    F/U: Self-harm action  No    F/U: Safety concerns No No           View : No data to display.              History of Present Illness       Diabetes:   She presents for follow up of diabetes.  She is checking home blood glucose with a continuous glucose monitor.  She checks blood glucose before and after meals and at bedtime.  Blood glucose is sometimes over 200 and never under 70. She is aware of hypoglycemia symptoms including shakiness and dizziness. She is concerned about blood sugar frequently over 200. She is having numbness in feet and weight gain.         Hyperlipidemia:  She presents for follow up of hyperlipidemia.  She is not taking medication to lower cholesterol. She is not having myalgia or other side effects to statin medications.    Headaches:   Since the patient's last clinic visit, headaches are: improved  The patient is getting headaches:  Getting up in the morning and after eating  She is able to do normal daily activities when she has a migraine.  The patient is taking the following rescue/relief medications:  Tylenol   Patient states \"I get some relief\" from the rescue/relief medications.   The patient is taking the following medications to prevent " migraines:  No medications to prevent migraines  In the past 4 weeks, the patient has gone to an Urgent Care or Emergency Room 0 times times due to headaches.    She eats 0-1 servings of fruits and vegetables daily.She consumes 1 sweetened beverage(s) daily.She exercises with enough effort to increase her heart rate 9 or less minutes per day.  She exercises with enough effort to increase her heart rate 3 or less days per week. She is missing 7 dose(s) of medications per week.  She is not taking prescribed medications regularly due to side effects and other.               Review of Systems         Objective    Vitals - Patient Reported  Weight (Patient Reported): 63.5 kg (140 lb)  Height (Patient Reported): 152.4 cm (5')  BMI (Based on Pt Reported Ht/Wt): 27.34  Pain Score: No Pain (0)      Vitals:  No vitals were obtained today due to virtual visit.    Physical Exam   GENERAL: Healthy, alert and no distress  RESP: No audible wheeze, cough, or visible cyanosis.  No visible retractions or increased work of breathing.    PSYCH: Mentation appears normal, affect normal/bright, judgement and insight intact, normal speech and appearance well-groomed.                Video-Visit Details    Type of service:  Video Visit   Video Start Time: 8:45 AM  Video End Time:8:45 AM    Originating Location (pt. Location): Home    Distant Location (provider location):  On-site  Platform used for Video Visit: [employer]

## 2023-05-01 ENCOUNTER — MYC MEDICAL ADVICE (OUTPATIENT)
Dept: FAMILY MEDICINE | Facility: CLINIC | Age: 33
End: 2023-05-01
Payer: COMMERCIAL

## 2023-05-01 DIAGNOSIS — E11.65 TYPE 2 DIABETES MELLITUS WITH HYPERGLYCEMIA, WITHOUT LONG-TERM CURRENT USE OF INSULIN (H): Primary | ICD-10-CM

## 2023-05-02 ENCOUNTER — TELEPHONE (OUTPATIENT)
Dept: FAMILY MEDICINE | Facility: CLINIC | Age: 33
End: 2023-05-02
Payer: COMMERCIAL

## 2023-05-02 NOTE — TELEPHONE ENCOUNTER
General Call    Pt is scheduled 5/26 with Gaby Ivan. Pt has an appt in 5 wks to have her A1C rechecked. Pt is wondering if she can get any literature before her appt to help get her started with her diet. Please send via My Chart or email to: [email or web address].     Thank you!

## 2023-05-02 NOTE — TELEPHONE ENCOUNTER
Added patient to mailing list.     JACOBY Silva [facility]  Triage: [phone]  [phone]

## 2023-05-04 NOTE — TELEPHONE ENCOUNTER
Dr. Merrill: pended meter kit    Hello!  In my last visit, doctor Desirae order for me the glucose test strip to test my blood sugar in the morning. However, she ordered the test strips and lancets but not the METER  so I could not pick it up from the pharmacy. Can you please put the METER in the order so I could start monitoring my blood sugar?     Mya GARDUNO RN  [facility]

## 2023-09-20 ENCOUNTER — LAB (OUTPATIENT)
Dept: LAB | Facility: CLINIC | Age: 33
End: 2023-09-20
Payer: COMMERCIAL

## 2023-09-20 ENCOUNTER — VIRTUAL VISIT (OUTPATIENT)
Dept: FAMILY MEDICINE | Facility: CLINIC | Age: 33
End: 2023-09-20
Payer: COMMERCIAL

## 2023-09-20 DIAGNOSIS — E11.65 TYPE 2 DIABETES MELLITUS WITH HYPERGLYCEMIA, WITHOUT LONG-TERM CURRENT USE OF INSULIN (H): ICD-10-CM

## 2023-09-20 DIAGNOSIS — E55.9 VITAMIN D DEFICIENCY: ICD-10-CM

## 2023-09-20 DIAGNOSIS — R20.2 TINGLING: ICD-10-CM

## 2023-09-20 DIAGNOSIS — E78.5 HYPERLIPIDEMIA LDL GOAL <160: ICD-10-CM

## 2023-09-20 DIAGNOSIS — E11.65 TYPE 2 DIABETES MELLITUS WITH HYPERGLYCEMIA, WITHOUT LONG-TERM CURRENT USE OF INSULIN (H): Primary | ICD-10-CM

## 2023-09-20 DIAGNOSIS — F32.0 CURRENT MILD EPISODE OF MAJOR DEPRESSIVE DISORDER, UNSPECIFIED WHETHER RECURRENT (H): ICD-10-CM

## 2023-09-20 LAB
ANION GAP SERPL CALCULATED.3IONS-SCNC: 11 MMOL/L (ref 7–15)
BUN SERPL-MCNC: 12.4 MG/DL (ref 6–20)
CALCIUM SERPL-MCNC: 9.8 MG/DL (ref 8.6–10)
CHLORIDE SERPL-SCNC: 98 MMOL/L (ref 98–107)
CHOLEST SERPL-MCNC: 320 MG/DL
CREAT SERPL-MCNC: 0.87 MG/DL (ref 0.51–0.95)
CREAT UR-MCNC: 105 MG/DL
DEPRECATED HCO3 PLAS-SCNC: 26 MMOL/L (ref 22–29)
EGFRCR SERPLBLD CKD-EPI 2021: 90 ML/MIN/1.73M2
GLUCOSE SERPL-MCNC: 108 MG/DL (ref 70–99)
HBA1C MFR BLD: 7.1 % (ref 0–5.6)
HDLC SERPL-MCNC: 36 MG/DL
LDLC SERPL CALC-MCNC: ABNORMAL MG/DL
LDLC SERPL DIRECT ASSAY-MCNC: 185 MG/DL
MICROALBUMIN UR-MCNC: 64.7 MG/L
MICROALBUMIN/CREAT UR: 61.62 MG/G CR (ref 0–25)
NONHDLC SERPL-MCNC: 284 MG/DL
POTASSIUM SERPL-SCNC: 4.5 MMOL/L (ref 3.4–5.3)
SODIUM SERPL-SCNC: 135 MMOL/L (ref 136–145)
TRIGL SERPL-MCNC: 620 MG/DL

## 2023-09-20 PROCEDURE — 82306 VITAMIN D 25 HYDROXY: CPT | Performed by: FAMILY MEDICINE

## 2023-09-20 PROCEDURE — 36415 COLL VENOUS BLD VENIPUNCTURE: CPT | Performed by: FAMILY MEDICINE

## 2023-09-20 PROCEDURE — 99214 OFFICE O/P EST MOD 30 MIN: CPT | Mod: 95 | Performed by: FAMILY MEDICINE

## 2023-09-20 PROCEDURE — 82570 ASSAY OF URINE CREATININE: CPT | Performed by: FAMILY MEDICINE

## 2023-09-20 PROCEDURE — 83036 HEMOGLOBIN GLYCOSYLATED A1C: CPT | Performed by: FAMILY MEDICINE

## 2023-09-20 PROCEDURE — 83721 ASSAY OF BLOOD LIPOPROTEIN: CPT | Mod: 59 | Performed by: FAMILY MEDICINE

## 2023-09-20 PROCEDURE — 96127 BRIEF EMOTIONAL/BEHAV ASSMT: CPT | Mod: 95 | Performed by: FAMILY MEDICINE

## 2023-09-20 PROCEDURE — 80048 BASIC METABOLIC PNL TOTAL CA: CPT | Performed by: FAMILY MEDICINE

## 2023-09-20 PROCEDURE — 80061 LIPID PANEL: CPT | Performed by: FAMILY MEDICINE

## 2023-09-20 PROCEDURE — 82043 UR ALBUMIN QUANTITATIVE: CPT | Performed by: FAMILY MEDICINE

## 2023-09-20 ASSESSMENT — PATIENT HEALTH QUESTIONNAIRE - PHQ9
10. IF YOU CHECKED OFF ANY PROBLEMS, HOW DIFFICULT HAVE THESE PROBLEMS MADE IT FOR YOU TO DO YOUR WORK, TAKE CARE OF THINGS AT HOME, OR GET ALONG WITH OTHER PEOPLE: SOMEWHAT DIFFICULT
SUM OF ALL RESPONSES TO PHQ QUESTIONS 1-9: 8
SUM OF ALL RESPONSES TO PHQ QUESTIONS 1-9: 8

## 2023-09-20 NOTE — PROGRESS NOTES
Timothy is a 33 year old who is being evaluated via a billable video visit.              Assessment & Plan     Type 2 diabetes mellitus with hyperglycemia, without long-term current use of insulin (H)  -Due for A1c check, fasting blood sugars not at goal.  -Pt endorses difficulty with making dietary changes. Encouraged pt to make small changes, encouraged goal setting.  -Declined trying diabetic education or more frequent office visit to help with blood sugar monitoring  -Encouraged 30 minutes of walking daily  -Will have pt come in for labs as below  -Follow-up in 3 months as scheduled for monitoring  - Albumin Random Urine Quantitative with Creat Ratio  - Basic metabolic panel  - Hemoglobin A1c    Hyperlipidemia LDL goal <160  -Encouraged dietary and lifestyle changes  - Lipid panel reflex to direct LDL Fasting    Vitamin D deficiency  -Taking multivitamin with D, due of recheck of D levels  - Vitamin D Deficiency    Tingling  -Right small toe, continue to monitor  -Foot exam next visit    Depression  -Has tried counseling at school, found it unhelpful  -Has tried sertraline for short period but did not keep up with it, encouraged her to restart   -Mood makes it difficult to manage diabetes and hyperlipidemia due to lack of motivation  -Continue to monitor        [facility] DO Desirae  [facility]   Timothy is a 33 year old, presenting for the following health issues:  High cholesterol         9/20/2023     7:38 AM   Additional Questions   Roomed by NICO Rod   Accompanied by self         9/20/2023     7:38 AM   Patient Reported Additional Medications   Patient reports taking the following new medications none     Patient here for follow-up on diabetes, mood, and hyperlipidemia.  Type 2 diabetes diagnosed earlier this year. Checking blood sugars at home.  Blood sugars in 155-160 in the morning.  Tried getting scheduled with diabetes educator but could not make it fit with her  schedule. Has been using educational material from diabetes education.   Notes that she is having difficulty with adjusting her diet. Feels like it is difficult to make dietary changes. Tries to change diet but loses motivation. States she is continuing to try to modify her diet: switching from hash browns in am to oatmeal, trying to eat less rice and noodles, avoiding instant noodles.  Tried sertraline in the past but stopped. She is undecided about restarting it to help with her mood and motivation.   Went to mental health counselor at school but did not feel that it was helpful.  Tingling right small toes intermittently, recently. Wonders if this is due to diabetes.   Was walking 30 - 60 minutes a daily, but stopped due to decrease motivation.  Taking multivitamin with D.     History of Present Illness       Diabetes:   She presents for follow up of diabetes.  She is checking home blood glucose a few times a week.   She checks blood glucose before meals.  Blood glucose is sometimes over 200 and never under 70. She is aware of hypoglycemia symptoms including shakiness, dizziness and weakness.   She is concerned about blood sugar frequently over 200.               Hyperlipidemia:  She presents for follow up of hyperlipidemia.   She is not taking medication to lower cholesterol. She is not having myalgia or other side effects to statin medications.    She eats 0-1 servings of fruits and vegetables daily.She consumes 2 sweetened beverage(s) daily.She exercises with enough effort to increase her heart rate 20 to 29 minutes per day.  She exercises with enough effort to increase her heart rate 3 or less days per week. She is missing 5 dose(s) of medications per week.  She is not taking prescribed medications regularly due to remembering to take.   =      Review of Systems         Objective    Vitals - Patient Reported  Height (Patient Reported): 152.4 cm (5')  Pain Score: No Pain (0)        Physical Exam   GENERAL:  Healthy, alert and no distress  EYES: Eyes grossly normal to inspection.  No discharge or erythema, or obvious scleral/conjunctival abnormalities.  RESP: No audible wheeze, cough, or visible cyanosis.  No visible retractions or increased work of breathing.    SKIN: Visible skin clear. No significant rash, abnormal pigmentation or lesions.  NEURO: Cranial nerves grossly intact.  Mentation and speech appropriate for age.  PSYCH: Mentation appears normal, affect normal/bright, judgement and insight intact, normal speech and appearance well-groomed.                Video-Visit Details    Type of service:  Video Visit   Video Start Time:  7:50 am  Video End Time: 8:10 am    Originating Location (pt. Location): Home    Distant Location (provider location):  On-site  Platform used for Video Visit: Donna

## 2023-09-21 LAB — DEPRECATED CALCIDIOL+CALCIFEROL SERPL-MC: 16 UG/L (ref 20–75)

## 2023-11-30 NOTE — TELEPHONE ENCOUNTER
Pt called in with many questions regarding her ultra sound and quad screen results. Writer spoke with Dr. Dawson who recommended GC. Writer also spoke with Cresencio Hagan. Pt will come in for GC apt on 7/1 at 1:30 pm at [facility]. Patient screened for covid-19 via phone.  Pt denies fever, new cough (within past 14 days), or new SOB (within past 14 days).   Isabella Sierra RN    
Sherin

## 2024-02-19 ENCOUNTER — OFFICE VISIT (OUTPATIENT)
Dept: FAMILY MEDICINE | Facility: CLINIC | Age: 34
End: 2024-02-19
Payer: COMMERCIAL

## 2024-02-19 VITALS
OXYGEN SATURATION: 100 % | WEIGHT: 145 LBS | SYSTOLIC BLOOD PRESSURE: 110 MMHG | HEIGHT: 60 IN | TEMPERATURE: 98 F | HEART RATE: 97 BPM | DIASTOLIC BLOOD PRESSURE: 72 MMHG | BODY MASS INDEX: 28.47 KG/M2

## 2024-02-19 DIAGNOSIS — T88.7XXA MEDICATION SIDE EFFECTS: ICD-10-CM

## 2024-02-19 DIAGNOSIS — E78.5 HYPERLIPIDEMIA LDL GOAL <160: ICD-10-CM

## 2024-02-19 DIAGNOSIS — F32.0 CURRENT MILD EPISODE OF MAJOR DEPRESSIVE DISORDER, UNSPECIFIED WHETHER RECURRENT (H): ICD-10-CM

## 2024-02-19 DIAGNOSIS — M79.10 MUSCLE ACHE: ICD-10-CM

## 2024-02-19 DIAGNOSIS — E11.65 TYPE 2 DIABETES MELLITUS WITH HYPERGLYCEMIA, WITHOUT LONG-TERM CURRENT USE OF INSULIN (H): Primary | ICD-10-CM

## 2024-02-19 DIAGNOSIS — J06.9 VIRAL UPPER RESPIRATORY TRACT INFECTION: ICD-10-CM

## 2024-02-19 LAB
CHOLEST SERPL-MCNC: 306 MG/DL
CREAT UR-MCNC: 148 MG/DL
FASTING STATUS PATIENT QL REPORTED: NO
FLUAV AG SPEC QL IA: NEGATIVE
FLUBV AG SPEC QL IA: NEGATIVE
HBA1C MFR BLD: 8 % (ref 0–5.6)
HDLC SERPL-MCNC: 29 MG/DL
LDLC SERPL CALC-MCNC: ABNORMAL MG/DL
LDLC SERPL DIRECT ASSAY-MCNC: 185 MG/DL
MICROALBUMIN UR-MCNC: 318 MG/L
MICROALBUMIN/CREAT UR: 214.86 MG/G CR (ref 0–25)
NONHDLC SERPL-MCNC: 277 MG/DL
TRIGL SERPL-MCNC: 804 MG/DL

## 2024-02-19 PROCEDURE — 82570 ASSAY OF URINE CREATININE: CPT | Performed by: FAMILY MEDICINE

## 2024-02-19 PROCEDURE — 83721 ASSAY OF BLOOD LIPOPROTEIN: CPT | Mod: 59 | Performed by: FAMILY MEDICINE

## 2024-02-19 PROCEDURE — 99214 OFFICE O/P EST MOD 30 MIN: CPT | Performed by: FAMILY MEDICINE

## 2024-02-19 PROCEDURE — 87804 INFLUENZA ASSAY W/OPTIC: CPT | Performed by: FAMILY MEDICINE

## 2024-02-19 PROCEDURE — 83036 HEMOGLOBIN GLYCOSYLATED A1C: CPT | Performed by: FAMILY MEDICINE

## 2024-02-19 PROCEDURE — 80061 LIPID PANEL: CPT | Performed by: FAMILY MEDICINE

## 2024-02-19 PROCEDURE — 87635 SARS-COV-2 COVID-19 AMP PRB: CPT | Performed by: FAMILY MEDICINE

## 2024-02-19 PROCEDURE — 96127 BRIEF EMOTIONAL/BEHAV ASSMT: CPT | Performed by: FAMILY MEDICINE

## 2024-02-19 PROCEDURE — 36415 COLL VENOUS BLD VENIPUNCTURE: CPT | Performed by: FAMILY MEDICINE

## 2024-02-19 PROCEDURE — 82043 UR ALBUMIN QUANTITATIVE: CPT | Performed by: FAMILY MEDICINE

## 2024-02-19 RX ORDER — METFORMIN HCL 500 MG
TABLET, EXTENDED RELEASE 24 HR ORAL
Qty: 85 TABLET | Refills: 0 | Status: SHIPPED | OUTPATIENT
Start: 2024-02-19 | End: 2024-03-27

## 2024-02-19 RX ORDER — METFORMIN HCL 500 MG
1000 TABLET, EXTENDED RELEASE 24 HR ORAL 2 TIMES DAILY WITH MEALS
Qty: 360 TABLET | Refills: 3 | Status: SHIPPED | OUTPATIENT
Start: 2024-03-18 | End: 2024-03-27 | Stop reason: DRUGHIGH

## 2024-02-19 ASSESSMENT — PATIENT HEALTH QUESTIONNAIRE - PHQ9
SUM OF ALL RESPONSES TO PHQ QUESTIONS 1-9: 9
SUM OF ALL RESPONSES TO PHQ QUESTIONS 1-9: 9
10. IF YOU CHECKED OFF ANY PROBLEMS, HOW DIFFICULT HAVE THESE PROBLEMS MADE IT FOR YOU TO DO YOUR WORK, TAKE CARE OF THINGS AT HOME, OR GET ALONG WITH OTHER PEOPLE: SOMEWHAT DIFFICULT

## 2024-02-19 NOTE — PROGRESS NOTES
Assessment & Plan     Type 2 diabetes mellitus with hyperglycemia, without long-term current use of insulin (H)  -A1c 8.0, worse. Goal <7.  -Open to seeing diabetic educator and starting metformin.  -Referral to diabetic ed with note to possibly start CGM if covered by insurance  -Start metformin, side effects discussed  -Continue to work on lifestyle changes.  -Follow-up in 6-8 weeks for monitoring.  - Hemoglobin A1c  - Albumin Random Urine Quantitative with Creat Ratio  - Adult Diabetes Education  Referral  - metFORMIN (GLUCOPHAGE XR) 500 MG 24 hr tablet  Dispense: 85 tablet; Refill: 0  - metFORMIN (GLUCOPHAGE XR) 500 MG 24 hr tablet  Dispense: 360 tablet; Refill: 3    Hyperlipidemia LDL goal <160  Medication side effects  Muscle ache  -Muscle aches on rosuvastatin, pt discontinued  -Will readdress adding on a different statin at next visit  - Lipid panel reflex to direct LDL Non-fasting    Viral upper respiratory tract infection  -Day 2 of illness.   -Discussed testing for flu and covid.  -If covid positive pt would be interested in Paxlovid for treatment, side effects discussed.  - Symptomatic COVID-19 Virus (Coronavirus) by PCR Nasopharyngeal  - Influenza A & B Antigen - Clinic Collect    Current mild episode of major depressive disorder, unspecified whether recurrent (H24)  -Stable for patient.  -No interested in treatment at this time  -Continue to monitor              BMI  Estimated body mass index is 28.32 kg/m  as calculated from the following:    Height as of this encounter: 1.524 m (5').    Weight as of this encounter: 65.8 kg (145 lb).             Raf Aguirre is a 33 year old, presenting for the following health issues:  Follow Up (DM2)      2/19/2024     1:28 PM   Additional Questions   Roomed by Fox   Accompanied by self     History of Present Illness       Diabetes:   She presents for follow up of diabetes.    She is not checking blood glucose.        She is concerned about other.    She is having numbness in feet, burning in feet, excessive thirst and weight gain.            Hyperlipidemia:  She presents for follow up of hyperlipidemia.   She is not taking medication to lower cholesterol. She is having myalgia or other side effects to statin medications.    She eats 2-3 servings of fruits and vegetables daily.She consumes 1 sweetened beverage(s) daily.She exercises with enough effort to increase her heart rate 9 or less minutes per day.  She exercises with enough effort to increase her heart rate 3 or less days per week.   She is taking medications regularly.     Here today of DM2 recheck. Recently came back from trip to [facility]. A1c 8.0 today. Has glucose testing supplies but finger sticks hurt so she does not check. Wants to try CGM. Endorses difficulty with exercise in the winter.    Was taking rosuvastatin. Stopped a few months ago to muscle aches.     2 days of subjective fever, cough, body aches. Recently returned from [place]. Taking otc medication. Has not done home covid test.        4/27/2023     8:06 AM 9/20/2023     7:33 AM 2/19/2024     1:37 PM   PHQ   PHQ-9 Total Score 7 8 9   Q9: Thoughts of better off dead/self-harm past 2 weeks Not at all Several days Not at all   F/U: Thoughts of suicide or self-harm  Yes    F/U: Self harm-plan  Yes    F/U: Self-harm action  No    F/U: Safety concerns  No                     Objective    /72 (BP Location: Right arm, Patient Position: Sitting, Cuff Size: Adult Regular)   Pulse 97   Temp 98  F (36.7  C) (Temporal)   Ht 1.524 m (5')   Wt 65.8 kg (145 lb)   LMP 02/13/2024 (Approximate)   SpO2 100%   BMI 28.32 kg/m    Body mass index is 28.32 kg/m .  Physical Exam   GENERAL: alert and no distress  RESP: intermittent cough, no respiratory distress  PSYCH: mentation appears normal, affect normal/bright            Signed Electronically by: Jeff Merrill DO

## 2024-02-20 ENCOUNTER — PATIENT OUTREACH (OUTPATIENT)
Dept: CARE COORDINATION | Facility: CLINIC | Age: 34
End: 2024-02-20
Payer: COMMERCIAL

## 2024-02-20 LAB — SARS-COV-2 RNA RESP QL NAA+PROBE: NEGATIVE

## 2024-02-21 DIAGNOSIS — E11.65 TYPE 2 DIABETES MELLITUS WITH HYPERGLYCEMIA, WITHOUT LONG-TERM CURRENT USE OF INSULIN (H): ICD-10-CM

## 2024-02-21 RX ORDER — METFORMIN HCL 500 MG
TABLET, EXTENDED RELEASE 24 HR ORAL
Qty: 85 TABLET | Refills: 0 | OUTPATIENT
Start: 2024-02-21

## 2024-03-05 ENCOUNTER — PATIENT OUTREACH (OUTPATIENT)
Dept: CARE COORDINATION | Facility: CLINIC | Age: 34
End: 2024-03-05
Payer: COMMERCIAL

## 2024-03-12 ENCOUNTER — MYC MEDICAL ADVICE (OUTPATIENT)
Dept: FAMILY MEDICINE | Facility: CLINIC | Age: 34
End: 2024-03-12
Payer: COMMERCIAL

## 2024-03-27 ENCOUNTER — VIRTUAL VISIT (OUTPATIENT)
Dept: FAMILY MEDICINE | Facility: CLINIC | Age: 34
End: 2024-03-27
Payer: COMMERCIAL

## 2024-03-27 DIAGNOSIS — R53.83 OTHER FATIGUE: ICD-10-CM

## 2024-03-27 DIAGNOSIS — E55.9 VITAMIN D DEFICIENCY: Primary | ICD-10-CM

## 2024-03-27 DIAGNOSIS — E11.65 TYPE 2 DIABETES MELLITUS WITH HYPERGLYCEMIA, WITHOUT LONG-TERM CURRENT USE OF INSULIN (H): ICD-10-CM

## 2024-03-27 PROCEDURE — 99214 OFFICE O/P EST MOD 30 MIN: CPT | Mod: 95 | Performed by: FAMILY MEDICINE

## 2024-03-27 PROCEDURE — G2211 COMPLEX E/M VISIT ADD ON: HCPCS | Mod: 95 | Performed by: FAMILY MEDICINE

## 2024-03-27 RX ORDER — METFORMIN HCL 500 MG
500 TABLET, EXTENDED RELEASE 24 HR ORAL 2 TIMES DAILY WITH MEALS
Qty: 180 TABLET | Refills: 3 | Status: SHIPPED | OUTPATIENT
Start: 2024-03-27 | End: 2024-05-28

## 2024-03-27 RX ORDER — METFORMIN HCL 500 MG
1000 TABLET, EXTENDED RELEASE 24 HR ORAL 2 TIMES DAILY WITH MEALS
Qty: 360 TABLET | Refills: 3 | Status: CANCELLED | OUTPATIENT
Start: 2024-03-27

## 2024-03-27 RX ORDER — ATORVASTATIN CALCIUM 10 MG/1
10 TABLET, FILM COATED ORAL DAILY
Qty: 30 TABLET | Refills: 0 | Status: SHIPPED | OUTPATIENT
Start: 2024-03-27 | End: 2024-04-29

## 2024-03-27 ASSESSMENT — PATIENT HEALTH QUESTIONNAIRE - PHQ9: SUM OF ALL RESPONSES TO PHQ QUESTIONS 1-9: 7

## 2024-03-27 NOTE — PROGRESS NOTES
Timothy is a 33 year old who is being evaluated via a billable video visit.    How would you like to obtain your AVS? MyChart  If the video visit is dropped, the invitation should be resent by: Text to cell phone: [phone]  Will anyone else be joining your video visit? No      Assessment & Plan     Type 2 diabetes mellitus with hyperglycemia, without long-term current use of insulin (H)  -Continue with metformin and home BS monitoring  -Follow-up in clinic in 2-3 months for monitoring  -Pt would like to retry statin, had muscle aches with rosuvastatin. Will trial atorvastatin, pt will message me if side effects.  - atorvastatin (LIPITOR) 10 MG tablet  Dispense: 30 tablet; Refill: 0  - metFORMIN (GLUCOPHAGE XR) 500 MG 24 hr tablet  Dispense: 180 tablet; Refill: 3    Vitamin D deficiency  -On vitamin D 2000 international unit(s), due for recheck  - Vitamin D Deficiency    Other fatigue  - TSH with free T4 reflex    The longitudinal plan of care for the diagnosis(es)/condition(s) as documented were addressed during this visit. Due to the added complexity in care, I will continue to support Timothy in the subsequent management and with ongoing continuity of care.     Subjective   Timothy is a 33 year old, presenting for the following health issues:  office visit    Video Start Time: 5:23 PM    History of Present Illness       Reason for visit:  Medication request    She eats 0-1 servings of fruits and vegetables daily.She consumes 1 sweetened beverage(s) daily.She exercises with enough effort to increase her heart rate 30 to 60 minutes per day.  She exercises with enough effort to increase her heart rate 3 or less days per week.   She is taking medications regularly.     DM2: started on metformin about 6 weeks ago.  GI upset and metformin, but getting better.  BS about 120s in the moring.   Tired all the time. Vitamin D def, is taking 2000 international unit(s) of vitamin D. Worries about thyroid dysfunction.                 Objective    Vitals - Patient Reported  Weight (Patient Reported): 65.8 kg (145 lb)  Height (Patient Reported): 152.4 cm (5')  BMI (Based on Pt Reported Ht/Wt): 28.32  Pain Score: No Pain (0)        Physical Exam   GENERAL: alert and no distress  EYES: Eyes grossly normal to inspection.  No discharge or erythema, or obvious scleral/conjunctival abnormalities.  RESP: No audible wheeze, cough, or visible cyanosis.    SKIN: Visible skin clear. No significant rash, abnormal pigmentation or lesions.  NEURO: Cranial nerves grossly intact.  Mentation and speech appropriate for age.  PSYCH: Appropriate affect, tone, and pace of words          Video-Visit Details    Type of service:  Video Visit   Video End Time:5:38 PM  Originating Location (pt. Location): Home    Distant Location (provider location):  On-site  Platform used for Video Visit: Donna  Signed Electronically by: Jeff Merrill DO

## 2024-04-29 ENCOUNTER — MYC MEDICAL ADVICE (OUTPATIENT)
Dept: FAMILY MEDICINE | Facility: CLINIC | Age: 34
End: 2024-04-29
Payer: COMMERCIAL

## 2024-04-29 DIAGNOSIS — E11.65 TYPE 2 DIABETES MELLITUS WITH HYPERGLYCEMIA, WITHOUT LONG-TERM CURRENT USE OF INSULIN (H): ICD-10-CM

## 2024-04-29 RX ORDER — ATORVASTATIN CALCIUM 10 MG/1
10 TABLET, FILM COATED ORAL DAILY
Qty: 30 TABLET | Refills: 0 | Status: SHIPPED | OUTPATIENT
Start: 2024-04-29 | End: 2024-05-26

## 2024-05-05 ENCOUNTER — HEALTH MAINTENANCE LETTER (OUTPATIENT)
Age: 34
End: 2024-05-05

## 2024-05-18 ENCOUNTER — TRANSFERRED RECORDS (OUTPATIENT)
Dept: MULTI SPECIALTY CLINIC | Facility: CLINIC | Age: 34
End: 2024-05-18

## 2024-05-18 LAB — RETINOPATHY: NORMAL

## 2024-05-26 ENCOUNTER — MYC REFILL (OUTPATIENT)
Dept: FAMILY MEDICINE | Facility: CLINIC | Age: 34
End: 2024-05-26
Payer: COMMERCIAL

## 2024-05-26 DIAGNOSIS — E11.65 TYPE 2 DIABETES MELLITUS WITH HYPERGLYCEMIA, WITHOUT LONG-TERM CURRENT USE OF INSULIN (H): ICD-10-CM

## 2024-05-26 RX ORDER — METFORMIN HCL 500 MG
500 TABLET, EXTENDED RELEASE 24 HR ORAL 2 TIMES DAILY WITH MEALS
Qty: 180 TABLET | Refills: 3 | Status: CANCELLED | OUTPATIENT
Start: 2024-05-26

## 2024-05-28 RX ORDER — METFORMIN HCL 500 MG
500 TABLET, EXTENDED RELEASE 24 HR ORAL 2 TIMES DAILY WITH MEALS
Qty: 180 TABLET | Refills: 2 | Status: SHIPPED | OUTPATIENT
Start: 2024-05-28

## 2024-07-02 ENCOUNTER — MYC MEDICAL ADVICE (OUTPATIENT)
Dept: FAMILY MEDICINE | Facility: CLINIC | Age: 34
End: 2024-07-02
Payer: COMMERCIAL

## 2024-08-14 ENCOUNTER — VIRTUAL VISIT (OUTPATIENT)
Dept: FAMILY MEDICINE | Facility: CLINIC | Age: 34
End: 2024-08-14
Payer: COMMERCIAL

## 2024-08-14 DIAGNOSIS — R20.2 TINGLING OF BOTH FEET: ICD-10-CM

## 2024-08-14 DIAGNOSIS — E11.65 TYPE 2 DIABETES MELLITUS WITH HYPERGLYCEMIA, WITHOUT LONG-TERM CURRENT USE OF INSULIN (H): Primary | ICD-10-CM

## 2024-08-14 DIAGNOSIS — E78.5 HYPERLIPIDEMIA LDL GOAL <160: ICD-10-CM

## 2024-08-14 DIAGNOSIS — Z78.9 ATTEMPTING TO CONCEIVE: ICD-10-CM

## 2024-08-14 PROCEDURE — G2211 COMPLEX E/M VISIT ADD ON: HCPCS | Mod: 95 | Performed by: FAMILY MEDICINE

## 2024-08-14 PROCEDURE — 99214 OFFICE O/P EST MOD 30 MIN: CPT | Mod: 95 | Performed by: FAMILY MEDICINE

## 2024-08-14 RX ORDER — ACYCLOVIR 400 MG/1
1 TABLET ORAL
Qty: 9 EACH | Refills: 5 | Status: SHIPPED | OUTPATIENT
Start: 2024-08-14

## 2024-08-14 RX ORDER — ACYCLOVIR 400 MG/1
1 TABLET ORAL ONCE
Qty: 1 EACH | Refills: 0 | Status: SHIPPED | OUTPATIENT
Start: 2024-08-14 | End: 2024-08-14

## 2024-08-14 ASSESSMENT — PATIENT HEALTH QUESTIONNAIRE - PHQ9
SUM OF ALL RESPONSES TO PHQ QUESTIONS 1-9: 2
10. IF YOU CHECKED OFF ANY PROBLEMS, HOW DIFFICULT HAVE THESE PROBLEMS MADE IT FOR YOU TO DO YOUR WORK, TAKE CARE OF THINGS AT HOME, OR GET ALONG WITH OTHER PEOPLE: NOT DIFFICULT AT ALL
SUM OF ALL RESPONSES TO PHQ QUESTIONS 1-9: 2

## 2024-08-14 NOTE — PROGRESS NOTES
Timothy is a 34 year old who is being evaluated via a billable video visit.    How would you like to obtain your AVS? MyChart  If the video visit is dropped, the invitation should be resent by: Text to cell phone: [phone]  Will anyone else be joining your video visit? No      Assessment & Plan     Type 2 diabetes mellitus with hyperglycemia, without long-term current use of insulin (H)  -Overdue for A1c check. Not keeping up with BS monitoring with finger sticks. Will place order for CGM and diabetic education.   -Advised follow-up with OB for preconception planning.  - Hemoglobin A1c  - Ob/Gyn  Referral  - Adult Diabetes Education  Referral  - Continuous Glucose  (DEXCOM G7 ) LIMA  Dispense: 1 each; Refill: 0  - Continuous Glucose Sensor (DEXCOM G7 SENSOR) MISC  Dispense: 9 each; Refill: 5    Hyperlipidemia LDL goal <160  -On statin. Due for lipid recheck. Discussed discontinuing statin if planning pregnancy.  - Lipid panel reflex to direct LDL Non-fasting  - Ob/Gyn  Referral  - Adult Diabetes Education  Referral    Attempting to conceive  -Patient higher risk -DM2 and HLD. Recommended preconception planning with OB prior to attempting to conceive.   - Ob/Gyn  Referral  - Adult Diabetes Education  Referral    Tingling of both feet  -Discussed likely diabetic neuropathy. May improve with better BS control, may not.    The longitudinal plan of care for the diagnosis(es)/condition(s) as documented were addressed during this visit. Due to the added complexity in care, I will continue to support Timothy in the subsequent management and with ongoing continuity of care.    BMI  Estimated body mass index is 28.32 kg/m  as calculated from the following:    Height as of 2/19/24: 1.524 m (5').    Weight as of 2/19/24: 65.8 kg (145 lb).             Subjective   Timothy is a 34 year old, presenting for the following health issues:  Medication Follow-up (And plans  for pregnancy )        8/14/2024     7:04 AM   Additional Questions   Roomed by Dea Napier   Accompanied by Self     History of Present Illness       Diabetes:   She presents for follow up of diabetes.  She is checking home blood glucose a few times a week.   She checks blood glucose before meals.  Blood glucose is sometimes over 200 and never under 70. She is aware of hypoglycemia symptoms including shakiness, weakness and other.   She is concerned about blood sugar frequently over 200 and other.   She is having numbness in feet, burning in feet, excessive thirst and weight gain.            Hyperlipidemia:  She presents for follow up of hyperlipidemia.   She is taking medication to lower cholesterol. She is not having myalgia or other side effects to statin medications.    Reason for visit:  Diabetics, high cholesterol, vitamin D, current medication and pregnancy planning.    She eats 0-1 servings of fruits and vegetables daily.She consumes 2 sweetened beverage(s) daily.She exercises with enough effort to increase her heart rate 9 or less minutes per day.  She exercises with enough effort to increase her heart rate 3 or less days per week. She is missing 2 dose(s) of medications per week.  She is not taking prescribed medications regularly due to remembering to take.     Here to follow-up on diabetes.  Monitoring blood sugars intermittently. Finding it difficult to do finger-stick monitoring. Wanting to start CGM. BS usually around 140 in am.     Started on metformin earlier this year. Taking it daily.  Also on stating for HLD.    Tingling in the bottoms of feet. Wonders if it will improve.    Planning for another pregnancy. Wants to know what she needs to due with her medications.                   Objective    Vitals - Patient Reported  Weight (Patient Reported): 65.8 kg (145 lb)  Height (Patient Reported): 152.4 cm (5')  BMI (Based on Pt Reported Ht/Wt): 28.32  Pain Score: No Pain (0)        Physical Exam    GENERAL: alert and no distress  EYES: Eyes grossly normal to inspection.  No discharge or erythema, or obvious scleral/conjunctival abnormalities.  RESP: No audible wheeze, cough, or visible cyanosis.    SKIN: Visible skin clear. No significant rash, abnormal pigmentation or lesions.  NEURO: Cranial nerves grossly intact.  Mentation and speech appropriate for age.  PSYCH: Appropriate affect, tone, and pace of words          Video-Visit Details    Type of service:  Video Visit   Originating Location (pt. Location): Home    Distant Location (provider location):  On-site  Platform used for Video Visit: Donna  Signed Electronically by: Jeff Merrill DO

## 2024-08-15 ENCOUNTER — LAB (OUTPATIENT)
Dept: LAB | Facility: CLINIC | Age: 34
End: 2024-08-15
Payer: COMMERCIAL

## 2024-08-15 DIAGNOSIS — E11.65 TYPE 2 DIABETES MELLITUS WITH HYPERGLYCEMIA, WITHOUT LONG-TERM CURRENT USE OF INSULIN (H): ICD-10-CM

## 2024-08-15 DIAGNOSIS — E78.5 HYPERLIPIDEMIA LDL GOAL <160: ICD-10-CM

## 2024-08-15 DIAGNOSIS — E55.9 VITAMIN D DEFICIENCY: ICD-10-CM

## 2024-08-15 DIAGNOSIS — R53.83 OTHER FATIGUE: ICD-10-CM

## 2024-08-15 LAB
CHOLEST SERPL-MCNC: 205 MG/DL
FASTING STATUS PATIENT QL REPORTED: YES
HBA1C MFR BLD: 6.7 % (ref 0–5.6)
HDLC SERPL-MCNC: 33 MG/DL
LDLC SERPL CALC-MCNC: 104 MG/DL
NONHDLC SERPL-MCNC: 172 MG/DL
TRIGL SERPL-MCNC: 340 MG/DL
TSH SERPL DL<=0.005 MIU/L-ACNC: 1.53 UIU/ML (ref 0.3–4.2)
VIT D+METAB SERPL-MCNC: 27 NG/ML (ref 20–50)

## 2024-08-15 PROCEDURE — 80061 LIPID PANEL: CPT

## 2024-08-15 PROCEDURE — 83036 HEMOGLOBIN GLYCOSYLATED A1C: CPT

## 2024-08-15 PROCEDURE — 36415 COLL VENOUS BLD VENIPUNCTURE: CPT

## 2024-08-15 PROCEDURE — 84443 ASSAY THYROID STIM HORMONE: CPT

## 2024-08-15 PROCEDURE — 82306 VITAMIN D 25 HYDROXY: CPT

## 2024-09-17 ENCOUNTER — PATIENT OUTREACH (OUTPATIENT)
Dept: CARE COORDINATION | Facility: CLINIC | Age: 34
End: 2024-09-17
Payer: COMMERCIAL

## 2024-10-15 ENCOUNTER — MYC REFILL (OUTPATIENT)
Dept: FAMILY MEDICINE | Facility: CLINIC | Age: 34
End: 2024-10-15

## 2024-10-15 DIAGNOSIS — E11.65 TYPE 2 DIABETES MELLITUS WITH HYPERGLYCEMIA, WITHOUT LONG-TERM CURRENT USE OF INSULIN (H): ICD-10-CM

## 2024-10-16 RX ORDER — ATORVASTATIN CALCIUM 10 MG/1
10 TABLET, FILM COATED ORAL DAILY
Qty: 90 TABLET | Refills: 1 | Status: SHIPPED | OUTPATIENT
Start: 2024-10-16

## 2025-01-17 ENCOUNTER — MYC REFILL (OUTPATIENT)
Dept: FAMILY MEDICINE | Facility: CLINIC | Age: 35
End: 2025-01-17
Payer: COMMERCIAL

## 2025-01-17 DIAGNOSIS — E11.65 TYPE 2 DIABETES MELLITUS WITH HYPERGLYCEMIA, WITHOUT LONG-TERM CURRENT USE OF INSULIN (H): ICD-10-CM

## 2025-01-21 RX ORDER — METFORMIN HYDROCHLORIDE 500 MG/1
500 TABLET, EXTENDED RELEASE ORAL 2 TIMES DAILY WITH MEALS
Qty: 180 TABLET | Refills: 0 | Status: SHIPPED | OUTPATIENT
Start: 2025-01-21

## 2025-01-21 NOTE — TELEPHONE ENCOUNTER
Pls inform pt she is due for clinic visit for DM2 monitoring. Bridge refill provided.    Jeff Merrill DO    
Initial (On Arrival)

## 2025-03-15 ENCOUNTER — HEALTH MAINTENANCE LETTER (OUTPATIENT)
Age: 35
End: 2025-03-15

## 2025-04-10 ENCOUNTER — MYC MEDICAL ADVICE (OUTPATIENT)
Dept: FAMILY MEDICINE | Facility: CLINIC | Age: 35
End: 2025-04-10
Payer: COMMERCIAL

## 2025-04-14 DIAGNOSIS — E11.65 TYPE 2 DIABETES MELLITUS WITH HYPERGLYCEMIA, WITHOUT LONG-TERM CURRENT USE OF INSULIN (H): ICD-10-CM

## 2025-04-14 RX ORDER — METFORMIN HYDROCHLORIDE 500 MG/1
500 TABLET, EXTENDED RELEASE ORAL 2 TIMES DAILY WITH MEALS
Qty: 180 TABLET | Refills: 0 | Status: SHIPPED | OUTPATIENT
Start: 2025-04-14

## 2025-04-14 RX ORDER — ATORVASTATIN CALCIUM 10 MG/1
10 TABLET, FILM COATED ORAL DAILY
Qty: 90 TABLET | Refills: 0 | Status: SHIPPED | OUTPATIENT
Start: 2025-04-14

## 2025-04-14 NOTE — TELEPHONE ENCOUNTER
Writer responded to patient message via [employer].     Titi Partida, MSN, RN   [facility]

## 2025-06-24 ENCOUNTER — OFFICE VISIT (OUTPATIENT)
Dept: FAMILY MEDICINE | Facility: CLINIC | Age: 35
End: 2025-06-24
Payer: COMMERCIAL

## 2025-06-24 VITALS
HEART RATE: 80 BPM | TEMPERATURE: 98.1 F | BODY MASS INDEX: 27.29 KG/M2 | OXYGEN SATURATION: 99 % | DIASTOLIC BLOOD PRESSURE: 68 MMHG | RESPIRATION RATE: 18 BRPM | SYSTOLIC BLOOD PRESSURE: 102 MMHG | WEIGHT: 139 LBS | HEIGHT: 60 IN

## 2025-06-24 DIAGNOSIS — Z00.00 ROUTINE GENERAL MEDICAL EXAMINATION AT A HEALTH CARE FACILITY: Primary | ICD-10-CM

## 2025-06-24 DIAGNOSIS — R20.0 NUMBNESS OF TOES: ICD-10-CM

## 2025-06-24 DIAGNOSIS — Z31.69 ENCOUNTER FOR PRECONCEPTION CONSULTATION: ICD-10-CM

## 2025-06-24 DIAGNOSIS — E11.65 TYPE 2 DIABETES MELLITUS WITH HYPERGLYCEMIA, WITHOUT LONG-TERM CURRENT USE OF INSULIN (H): ICD-10-CM

## 2025-06-24 DIAGNOSIS — E78.2 MIXED HYPERLIPIDEMIA: ICD-10-CM

## 2025-06-24 DIAGNOSIS — Z78.9 ATTEMPTING TO CONCEIVE: ICD-10-CM

## 2025-06-24 LAB
ANION GAP SERPL CALCULATED.3IONS-SCNC: 12 MMOL/L (ref 7–15)
BUN SERPL-MCNC: 9.4 MG/DL (ref 6–20)
CALCIUM SERPL-MCNC: 9.3 MG/DL (ref 8.8–10.4)
CHLORIDE SERPL-SCNC: 101 MMOL/L (ref 98–107)
CHOLEST SERPL-MCNC: 265 MG/DL
CREAT SERPL-MCNC: 0.64 MG/DL (ref 0.51–0.95)
CREAT UR-MCNC: 182 MG/DL
EGFRCR SERPLBLD CKD-EPI 2021: >90 ML/MIN/1.73M2
EST. AVERAGE GLUCOSE BLD GHB EST-MCNC: 171 MG/DL
FASTING STATUS PATIENT QL REPORTED: ABNORMAL
FASTING STATUS PATIENT QL REPORTED: ABNORMAL
GLUCOSE SERPL-MCNC: 155 MG/DL (ref 70–99)
HBA1C MFR BLD: 7.6 % (ref 0–5.6)
HCO3 SERPL-SCNC: 22 MMOL/L (ref 22–29)
HDLC SERPL-MCNC: 33 MG/DL
LDLC SERPL CALC-MCNC: ABNORMAL MG/DL
LDLC SERPL DIRECT ASSAY-MCNC: 150 MG/DL
MICROALBUMIN UR-MCNC: 44.3 MG/L
MICROALBUMIN/CREAT UR: 24.34 MG/G CR (ref 0–25)
NONHDLC SERPL-MCNC: 232 MG/DL
POTASSIUM SERPL-SCNC: 4.2 MMOL/L (ref 3.4–5.3)
SODIUM SERPL-SCNC: 135 MMOL/L (ref 135–145)
TRIGL SERPL-MCNC: 512 MG/DL

## 2025-06-24 PROCEDURE — 36415 COLL VENOUS BLD VENIPUNCTURE: CPT | Performed by: FAMILY MEDICINE

## 2025-06-24 PROCEDURE — 99214 OFFICE O/P EST MOD 30 MIN: CPT | Performed by: FAMILY MEDICINE

## 2025-06-24 PROCEDURE — 3074F SYST BP LT 130 MM HG: CPT | Performed by: FAMILY MEDICINE

## 2025-06-24 PROCEDURE — 83036 HEMOGLOBIN GLYCOSYLATED A1C: CPT | Performed by: FAMILY MEDICINE

## 2025-06-24 PROCEDURE — 80048 BASIC METABOLIC PNL TOTAL CA: CPT | Performed by: FAMILY MEDICINE

## 2025-06-24 PROCEDURE — 83721 ASSAY OF BLOOD LIPOPROTEIN: CPT | Mod: 59 | Performed by: FAMILY MEDICINE

## 2025-06-24 PROCEDURE — 96127 BRIEF EMOTIONAL/BEHAV ASSMT: CPT | Performed by: FAMILY MEDICINE

## 2025-06-24 PROCEDURE — 3051F HG A1C>EQUAL 7.0%<8.0%: CPT | Performed by: FAMILY MEDICINE

## 2025-06-24 PROCEDURE — 3078F DIAST BP <80 MM HG: CPT | Performed by: FAMILY MEDICINE

## 2025-06-24 PROCEDURE — 82570 ASSAY OF URINE CREATININE: CPT | Performed by: FAMILY MEDICINE

## 2025-06-24 PROCEDURE — 1126F AMNT PAIN NOTED NONE PRSNT: CPT | Performed by: FAMILY MEDICINE

## 2025-06-24 PROCEDURE — 82043 UR ALBUMIN QUANTITATIVE: CPT | Performed by: FAMILY MEDICINE

## 2025-06-24 PROCEDURE — 99207 PR FOOT EXAM NO CHARGE: CPT | Mod: 25 | Performed by: FAMILY MEDICINE

## 2025-06-24 PROCEDURE — G2211 COMPLEX E/M VISIT ADD ON: HCPCS | Performed by: FAMILY MEDICINE

## 2025-06-24 PROCEDURE — 80061 LIPID PANEL: CPT | Performed by: FAMILY MEDICINE

## 2025-06-24 RX ORDER — ATORVASTATIN CALCIUM 10 MG/1
10 TABLET, FILM COATED ORAL DAILY
Qty: 90 TABLET | Refills: 3 | Status: CANCELLED | OUTPATIENT
Start: 2025-06-24

## 2025-06-24 RX ORDER — METFORMIN HYDROCHLORIDE 500 MG/1
500 TABLET, EXTENDED RELEASE ORAL 2 TIMES DAILY WITH MEALS
Qty: 180 TABLET | Refills: 2 | Status: CANCELLED | OUTPATIENT
Start: 2025-06-24

## 2025-06-24 SDOH — HEALTH STABILITY: PHYSICAL HEALTH: ON AVERAGE, HOW MANY DAYS PER WEEK DO YOU ENGAGE IN MODERATE TO STRENUOUS EXERCISE (LIKE A BRISK WALK)?: 1 DAY

## 2025-06-24 SDOH — HEALTH STABILITY: PHYSICAL HEALTH: ON AVERAGE, HOW MANY MINUTES DO YOU ENGAGE IN EXERCISE AT THIS LEVEL?: 10 MIN

## 2025-06-24 ASSESSMENT — PATIENT HEALTH QUESTIONNAIRE - PHQ9
SUM OF ALL RESPONSES TO PHQ QUESTIONS 1-9: 3
10. IF YOU CHECKED OFF ANY PROBLEMS, HOW DIFFICULT HAVE THESE PROBLEMS MADE IT FOR YOU TO DO YOUR WORK, TAKE CARE OF THINGS AT HOME, OR GET ALONG WITH OTHER PEOPLE: SOMEWHAT DIFFICULT
SUM OF ALL RESPONSES TO PHQ QUESTIONS 1-9: 3

## 2025-06-24 ASSESSMENT — SOCIAL DETERMINANTS OF HEALTH (SDOH): HOW OFTEN DO YOU GET TOGETHER WITH FRIENDS OR RELATIVES?: ONCE A WEEK

## 2025-06-24 ASSESSMENT — PAIN SCALES - GENERAL: PAINLEVEL_OUTOF10: NO PAIN (0)

## 2025-06-24 NOTE — PATIENT INSTRUCTIONS
"Patient Education   L?i Khuyên Haroon Sóc D? Phòng  Ðây là l?i pastor louis tôi thu?ng willam ra d? giúp m?i luanne?i kh?e m?nh. Nhóm haroon sóc c?a quý v? có th? có l?i khuyên c? th? dành riêng cho quý v?. Vui lòng trao d?i v?i nhóm haroon sóc v? fariba c?u haroon sóc d? phòng c?a chính quý v?.  L?i s?ng  T?p th? d?c ít nh?t 150 phút m?i tu?n (30 phút m?i ngày, 5 ngày m?t tu?n).  Th?c hi?n các ho?t d?ng ahumada cu?ng co 2 ngày m?t tu?n. Ði?u này s? giúp quý v? ki?m soát cân n?ng và marcella ng?a b?nh t?t.  Không hút thu?c.  Thoa jo ch?ng n?ng d? marcella ng?a slim thu da.  Ki?m tra m?c d? radon pineda nhà t? 2 d?n 5 nam m?t l?n. Radon là m?t lo?i khí không màu, không mùi có th? gây h?i cho ph?i c?a quý v?. Ð? tìm hi?u thêm, hãy truy c?p [email or web address] và tìm ki?m \"Radon in Homes\" (Radon pineda nhà).  Gi? súng không n?p d?n và khóa l?i d? ? mike an toàn fariba két s?t ho?c h?m ch?a súng, ho?c s? d?ng khóa súng và c?t chìa khóa di. Luôn khóa và c?t d?n ? ch? riêng. Ð? tìm hi?u thêm, hãy truy c?p [email or web address] và tìm ki?m \"safe gun storage\" (c?t gi? súng an toàn).  Corky du?ng  An ít nh?t 5 kh?u ph?n trái cây và asher qu? m?i ngày.  Hãy th? bánh mì lúa mì, g?o l?t và mì ?ng nguyên h?t (thay vì bánh mì tr?ng, g?o và mì ?ng).  N?p d? canxi và vitamin D. Ki?m tra nhãn trên th?c ph?m và hu?ng t?i 100% becky RDA (m?c tiêu th? hàng ngày du?c khuy?n ngh?).  Khám d?nh k?  Khám và v? sinh rang mi?ng 6 tháng m?t l?n.  G?p nhóm haroon sóc s?c kh?e c?a quý v? hàng namd? trao d?i v?:  B?t k? thay d?i nào v? s?c kh?e c?a quý v?.  B?t k? lo?i thu?c nào mà nhóm haroon sóc c?a quý v? dã kê don.  Haroon sóc d? phòng, k? ho?ch hóa juancarlos dình và cách marcella ng?a các b?nh mãn tính.  Tiêm ch?ng (v?c-ewelina)   Tiêm phòng HPV (d?n 26 tu?i), n?u quý v? mckenzie t?ng tiêm lo?i v?c-ewelina này corky?c dó.  Tiêm phòng viêm eagle B (d?n 59 tu?i), n?u quý v? mckenzie t?ng tiêm lo?i v?c-ewelina này corky?c dó.  Tiêm phòng COVID-19: Tiêm v?c-ewelina này khi d?n h?n.  Tiêm phòng cúm: Tiêm phòng cúm hàng nam.  Tiêm " phòng u?n ván: Tiêm phòng u?n ván 10 nam m?t l?n.  Tiêm phòng ph? c?u khu?n, viêm eagle A và RSV: Hãy h?i nhóm mckay sóc c?a quý v? xem li?u quý v? có c?n nh?ng harsha tiêm này hay không d?a trên nguy co emiliano?m b?nh c?a quý v?.  Tiêm phòng Vicki th?n kinh (dành cho tu?i t? 50 tr? lên).  Xét melissa?m s?c kh?e t?ng quát  Sàng l?c b?nh ti?u du?ng:  B?t d?u t? tu?i 35, Khám sàng l?c b?nh ti?u du?ng ít nh?t 3 nam m?t l?n.  N?u quý v? du?i 35 tu?i, hãy h?i nhóm mckay sóc xem quý v? có nên sàng l?c b?nh ti?u du?ng hay không.  Xét melissa?m cholesterol: T? tu?i 39, b?t d?u xét melissa?m cholesterol 5 nam m?t l?n, ho?c thu?ng xuyên hon n?u du?c khuy?n ngh?.  Ðo m?t d? xuong (DEXA): ? tu?i 50, hãy h?i nhóm mckay sóc c?a quý v? xem quý v? có nên th?c hi?n xét melissa?m này d? phát hi?n b?nh loãng xuong (xuong giòn) hay không.  Viêm eagle C: Ði xét melissa?m ít nh?t m?t l?n pineda d?i.  Khám sàng l?c phình d?ng m?ch ch? b?ng: Trao d?i v?i bác si c?a quý v? v? vi?c th?c hi?n sàng l?c này n?u quý v?:  Ðã t?ng hút thu?c; và  Là nam gi?i v? m?t sinh h?c; và  Pineda d? tu?i t? 65 d?n 75.  STI (b?nh emiliano?m trùng shruthi du?ng tình d?c)  Sidney?c 24 tu?i: Hãy h?i nhóm mckay sóc c?a quý v? xem quý v? có nên khám sàng l?c STI hay không.  Odette 24 tu?i: Sàng l?c STI n?u quý v? có nguy co m?c b?nh. Quý v? có nguy co m?c các b?nh STI (micha g?m c? HIV) n?u:  Quý v? có demetrius h? tình d?c tich c?c v?i hon m?t luanne?i.  Quý v? không s? d?ng micha dixon gustafson m?i lúc.  Quý v? ho?c b?n tình du?c ch?n doán m?c b?nh emiliano?m b?nh lây shruthi du?ng tình d?c.  N?u quý v? có nguy co emiliano?m HIV, hãy h?i lex thu?c PrEP d? marcella ng?a HIV.  Ði xét melissa?m HIV ít nh?t m?t l?n pineda d?i, cho dù quý v? có nguy co emiliano?m HIV hay không.  Xét melissa?m sàng l?c slim thu  Sàng l?c slim thu c? t? cung: N?u quý v? có c? t? cung, hãy b?t d?u làm xét melissa?m sàng l?c slim thu c? t? cung thu?ng xuyên t? tu?i 21. H?u h?t nh?ng luanne?i khám sàng l?c thu?ng xuyên v?i k?t qu? bình thu?ng d?u có th? ng?ng khám odette 65 tu?i. Hãy trao d?i v?  v?n d? này v?i bác si c?a quý v?.  Quét slim thu vú (ch?p camryn simone?n vú): N?u quý v? có hay t?ng có vú, hãy b?t d?u ch?p camryn simone?n vú thu?ng xuyên b?t d?u t? tu?i 40. Ðây là quá trình quét d? ki?m tra slim thu vú.  Sàng l?c slim thu d?i tràng: C?n b?t d?u sàng l?c slim thu d?i tràng t? tu?i 45.  Th?c hi?n xét melissa?m n?i soi d?i tràng 10 nam m?t l?n (ho?c thu?ng xuyên hon n?u quý v? có nguy co m?c b?nh) Ho?c, hãy h?i nhà cung c?p c?a quý v? v? các xét melissa?m phân fariba xét melissa?m FIT hàng nam ho?c xét melissa?m Cologuard 3 nam m?t l?n.  Ð? tìm hi?u thêm v? các tùy ch?n th? melissa?m c?a quý v?, hãy truy c?p: [email or web address].  Ð? du?c h? tr? willam ra wei?t d?nh, hãy truy c?p: [email or web address].  Xét melissa?m sàng l?c slim thu simone?n ti?n li?t: N?u quý v? có simone?n ti?n li?t và ? d? tu?i t? 55 d?n 69, hãy h?i bác si xem vi?c xét melissa?m sàng l?c slim thu simone?n ti?n li?t hàng nam có dem l?i l?i ích gì cho quý v? hay không.  Sàng l?c slim thu ph?i: N?u quý v? dã t?ng ho?c còn langston hút thu?c và t? 50 d?n 80 tu?i, hãy h?i nhóm mckay sóc c?a quý v? xem vi?c sàng l?c slim thu ph?i thu?ng xuyên có phù h?p v?i quý v? hay không.    Ch? nh?m m?c dích henry kh?o. Không th? thay th? l?i khuyên c?a nhà cung c?p d?ch v? mckay sóc s?c kh?e c?a quý v?. B?n wei?n   2023 [facility].   B?o harshal m?i wei?n. Ðu?c dánh giá lâm sàng b?i [facility]. [employer] [id number] - REV 04/24.

## 2025-06-24 NOTE — PROGRESS NOTES
Preventive Care Visit  [facility]  Jeff Merrill DO, Family Medicine  Jun 24, 2025      Assessment & Plan     Routine general medical examination at a health care facility    Type 2 diabetes mellitus with hyperglycemia, without long-term current use of insulin (H)  -A1c 7.6 today, improved but still not at goal of <7. Pt actively attempting to conceive, not taking metformin regularly or checking BS regularly. We discussed the importance of good BS control rod in pregnancy. Recommended that she work with diabetes ed to help improve BS and consider switching to insulin as it is preferred in pregnancy.   -Follow-up in 3 months as scheduled for monitoring.  - Hemoglobin A1c  - BASIC METABOLIC PANEL  - Albumin Random Urine Quantitative with Creat Ratio  - NH FOOT EXAM NO CHARGE  - Adult Diabetes Education  Referral    Numbness of toes  -Symptoms in 5th toe of both feet. No pain. Discussed likely due to diabetes. Again discussed importance of good glucose control to help prevent progression of neuropathy.    Mixed hyperlipidemia  -We discussed last visit that pt should discontinue taking atorvastatin if actively trying to conceive. Pt has continue to take it intermittently, will discontinue today as pt is trying to conceive.  - Lipid panel reflex to direct LDL Non-fasting  - Adult Diabetes Education  Referral    Attempting to conceive  -Recommended that patient see gyn for preconception counseling for additional guidance given her history of HLD and DM2. At the very least she should work with diabetic ed to help improve glucose control and possibly switch from metformin to insulin.   -Encouraged patient to take prenatal vitamin regularly during preconception period to help prevent birth defects.  - Ob/Gyn  Referral  - Adult Diabetes Education  Referral              BMI  Estimated body mass index is 27.15 kg/m  as calculated from the following:    Height as of  this encounter: 1.524 m (5').    Weight as of this encounter: 63 kg (139 lb).   Weight management plan: Discussed healthy diet and exercise guidelines  Reviewed preventive health counseling, as reflected in patient instructions       Regular exercise       Healthy diet/nutrition       Family planning       Folic Acid  Counseling  Appropriate preventive services were addressed with this patient via screening, questionnaire, or discussion as appropriate for fall prevention, nutrition, physical activity, Tobacco-use cessation, social engagement, weight loss and cognition.  Checklist reviewing preventive services available has been given to the patient.  Reviewed patient's diet, addressing concerns and/or questions.   She is at risk for lack of exercise and has been provided with information to increase physical activity for the benefit of her well-being.             Raf Aguirre is a 35 year old, presenting for the following:  Physical        6/24/2025     9:04 AM   Additional Questions   Roomed by Jeremias CHRISTENSEN        Via the Health Maintenance questionnaire, the patient has reported the following services have been completed -Eye Exam: [facility] 2024-05-18, this information has been sent to the abstraction team.    HPI    Other concerns: reports actively trying to conceive. Has been taking her atorvastatin and metformin a few times a week. Sometimes takes a multivitamin but not always because it makes her hungry. Checking BS a few times a week when she thinks it is high. Getting in some walking.           3/27/2024     3:23 PM 8/14/2024     6:54 AM 6/24/2025     8:22 AM   PHQ   PHQ-9 Total Score 7 2 3    Q9: Thoughts of better off dead/self-harm past 2 weeks Not at all Not at all Not at all       Patient-reported             Advance Care Planning    Discussed advance care planning with patient; however, patient declined at this time.        6/24/2025   General Health   How would you rate your overall physical health?  (!) FAIR   Feel stress (tense, anxious, or unable to sleep) Not at all         6/24/2025   Nutrition   Three or more servings of calcium each day? Yes   Diet: Low fat/cholesterol    Diabetic   How many servings of fruit and vegetables per day? (!) 0-1   How many sweetened beverages each day? 0-1       Multiple values from one day are sorted in reverse-chronological order         6/24/2025   Exercise   Days per week of moderate/strenous exercise 1 day   Average minutes spent exercising at this level 10 min   (!) EXERCISE CONCERN      6/24/2025   Social Factors   Frequency of gathering with friends or relatives Once a week   Worry food won't last until get money to buy more No   Food not last or not have enough money for food? No   Do you have housing? (Housing is defined as stable permanent housing and does not include staying outside in a car, in a tent, in an abandoned building, in an overnight shelter, or couch-surfing.) Yes   Are you worried about losing your housing? No   Lack of transportation? Yes   Unable to get utilities (heat,electricity)? No    (!) TRANSPORTATION CONCERN PRESENT      6/24/2025   Dental   Dentist two times every year? Yes       Today's PHQ-9 Score:       6/24/2025     8:22 AM   PHQ-9 SCORE   PHQ-9 Total Score MyChart 3 (Minimal depression)   PHQ-9 Total Score 3        Patient-reported         6/24/2025   Substance Use   Alcohol more than 3/day or more than 7/wk No   Do you use any other substances recreationally? No     Social History     Tobacco Use    Smoking status: Never    Smokeless tobacco: Never   Vaping Use    Vaping status: Never Used   Substance Use Topics    Alcohol use: Never    Drug use: Never                  6/24/2025   STI Screening   New sexual partner(s) since last STI/HIV test? No     History of abnormal Pap smear: No - age 30- 64 PAP with HPV every 5 years recommended        Latest Ref Rng & Units 11/30/2020     9:45 AM 11/30/2020     9:39 AM   PAP / HPV   PAP  (Historical)   NIL    HPV 16 DNA NEG^Negative Negative     HPV 18 DNA NEG^Negative Negative     Other HR HPV NEG^Negative Negative             6/24/2025   Contraception/Family Planning   Questions about contraception or family planning (!) YES    What are your periods like? Regular        Reviewed and updated as needed this visit by Provider   Tobacco      Surg Hx  Fam Hx  Soc Hx Sexual Activity                   Objective    Exam  /68 (BP Location: Right arm, Patient Position: Sitting, Cuff Size: Adult Regular)   Pulse 80   Temp 98.1  F (36.7  C) (Temporal)   Resp 18   Ht 1.524 m (5')   Wt 63 kg (139 lb)   LMP 06/03/2025   SpO2 99%   BMI 27.15 kg/m     Estimated body mass index is 27.15 kg/m  as calculated from the following:    Height as of this encounter: 1.524 m (5').    Weight as of this encounter: 63 kg (139 lb).    Physical Exam  GENERAL: alert and no distress  EYES: Eyes grossly normal to inspection, PERRL and conjunctivae and sclerae normal  HENT: nose and mouth without ulcers or lesions  NECK: no adenopathy, no asymmetry, masses, or scars  RESP: lungs clear to auscultation - no rales, rhonchi or wheezes  CV: regular rate and rhythm, normal S1 S2, no S3 or S4, no murmur, click or rub, no peripheral edema  ABDOMEN: soft, nontender, no hepatosplenomegaly, no masses and bowel sounds normal  MS: no gross musculoskeletal defects noted, no edema  SKIN: no suspicious lesions or rashes  NEURO: Normal strength and tone, mentation intact and speech normal  PSYCH: mentation appears normal, affect normal/bright  Diabetic foot exam: normal DP and PT pulses, no trophic changes or ulcerative lesions, and normal monofilament exam, except for findings noted on diabetic foot graphical image.  Decreased sensation over region 3 b/l.               Signed Electronically by: Jeff Merrill DO

## 2025-06-25 ENCOUNTER — PATIENT OUTREACH (OUTPATIENT)
Dept: CARE COORDINATION | Facility: CLINIC | Age: 35
End: 2025-06-25
Payer: COMMERCIAL

## 2025-06-26 ENCOUNTER — RESULTS FOLLOW-UP (OUTPATIENT)
Dept: FAMILY MEDICINE | Facility: CLINIC | Age: 35
End: 2025-06-26

## 2025-06-28 ENCOUNTER — HEALTH MAINTENANCE LETTER (OUTPATIENT)
Age: 35
End: 2025-06-28

## 2025-07-09 ENCOUNTER — MYC REFILL (OUTPATIENT)
Dept: FAMILY MEDICINE | Facility: CLINIC | Age: 35
End: 2025-07-09
Payer: COMMERCIAL

## 2025-07-09 DIAGNOSIS — E11.65 TYPE 2 DIABETES MELLITUS WITH HYPERGLYCEMIA, WITHOUT LONG-TERM CURRENT USE OF INSULIN (H): ICD-10-CM

## 2025-07-09 RX ORDER — LANCETS
EACH MISCELLANEOUS
Qty: 100 EACH | Refills: 0 | Status: SHIPPED | OUTPATIENT
Start: 2025-07-09

## 2025-07-14 ENCOUNTER — APPOINTMENT (OUTPATIENT)
Dept: INTERPRETER SERVICES | Facility: CLINIC | Age: 35
End: 2025-07-14
Payer: COMMERCIAL

## 2025-07-15 ENCOUNTER — VIRTUAL VISIT (OUTPATIENT)
Dept: EDUCATION SERVICES | Facility: CLINIC | Age: 35
End: 2025-07-15
Attending: FAMILY MEDICINE
Payer: COMMERCIAL

## 2025-07-15 ENCOUNTER — MYC MEDICAL ADVICE (OUTPATIENT)
Dept: EDUCATION SERVICES | Facility: CLINIC | Age: 35
End: 2025-07-15

## 2025-07-15 ENCOUNTER — VIRTUAL VISIT (OUTPATIENT)
Dept: OBGYN | Facility: CLINIC | Age: 35
End: 2025-07-15
Payer: COMMERCIAL

## 2025-07-15 DIAGNOSIS — E11.65 TYPE 2 DIABETES MELLITUS WITH HYPERGLYCEMIA, WITHOUT LONG-TERM CURRENT USE OF INSULIN (H): ICD-10-CM

## 2025-07-15 DIAGNOSIS — E11.65 TYPE 2 DIABETES MELLITUS WITH HYPERGLYCEMIA, WITHOUT LONG-TERM CURRENT USE OF INSULIN (H): Primary | ICD-10-CM

## 2025-07-15 DIAGNOSIS — O20.9 BLEEDING IN EARLY PREGNANCY: Primary | ICD-10-CM

## 2025-07-15 DIAGNOSIS — O09.91 SUPERVISION OF HIGH RISK PREGNANCY IN FIRST TRIMESTER: ICD-10-CM

## 2025-07-15 DIAGNOSIS — E78.2 MIXED HYPERLIPIDEMIA: ICD-10-CM

## 2025-07-15 DIAGNOSIS — Z78.9 ATTEMPTING TO CONCEIVE: ICD-10-CM

## 2025-07-15 PROCEDURE — G0108 DIAB MANAGE TRN  PER INDIV: HCPCS | Mod: 95 | Performed by: DIETITIAN, REGISTERED

## 2025-07-15 PROCEDURE — 99207 PR NO CHARGE NURSE ONLY: CPT | Mod: 93

## 2025-07-15 ASSESSMENT — ANXIETY QUESTIONNAIRES
GAD7 TOTAL SCORE: 4
7. FEELING AFRAID AS IF SOMETHING AWFUL MIGHT HAPPEN: SEVERAL DAYS
2. NOT BEING ABLE TO STOP OR CONTROL WORRYING: SEVERAL DAYS
IF YOU CHECKED OFF ANY PROBLEMS ON THIS QUESTIONNAIRE, HOW DIFFICULT HAVE THESE PROBLEMS MADE IT FOR YOU TO DO YOUR WORK, TAKE CARE OF THINGS AT HOME, OR GET ALONG WITH OTHER PEOPLE: SOMEWHAT DIFFICULT
1. FEELING NERVOUS, ANXIOUS, OR ON EDGE: SEVERAL DAYS
5. BEING SO RESTLESS THAT IT IS HARD TO SIT STILL: NOT AT ALL
GAD7 TOTAL SCORE: 4
3. WORRYING TOO MUCH ABOUT DIFFERENT THINGS: NOT AT ALL
6. BECOMING EASILY ANNOYED OR IRRITABLE: SEVERAL DAYS

## 2025-07-15 ASSESSMENT — PATIENT HEALTH QUESTIONNAIRE - PHQ9: 5. POOR APPETITE OR OVEREATING: NOT AT ALL

## 2025-07-15 NOTE — PROGRESS NOTES
SUBJECTIVE:     HPI:    This is a 35 year old female patient,  who presents for her first obstetrical visit.    SHELDON: 3/10/2026, by Last Menstrual Period on 25.  She is 6w0d weeks.  Her cycles are regular.  Her last menstrual period was normal.   Since her LMP, she has experienced  pelvic pain and vaginal bleeding).   She denies nausea, emesis, abdominal pain, fatigue, headache, loss of appetite, vaginal discharge, dysuria, urinary urgency, lightheadedness, urinary frequency, hemorrhoids, and constipation.    Additional History:   - Spouse - Christiano  - PASQUALE  - Desires NIPT  - Desires repeat   - Prenatal Hx:  x 1, GDM (insulin controlled)  - Has Type 2 Diabetes, followed by terry Pearce on 07/15/25  - C/O vaginal bleeding & cramping (ultrasound on 25), mood swings (see PHQ & RORY, pt will discuss with doctor at [facility], materials given for support)    Have you travelled during the pregnancy?Yes, If yes, where? Upcoming travel to [facility] in 2025 if yes, who?  & children  Have your sexual partner(s) travelled during the pregnancy?Yes, upcoming trip in 2025.  [facility] has reported past or current Zika virus transmission. No information or delay in data regarding a current outbreak.  If you travel, you should strictly follow steps to minimize exposure to and prevent mosquito bites.      HISTORY:   Planned Pregnancy: Yes  Marital Status:   Occupation: Nurse  Living in Household: Spouse and Children    Past History:  Her past medical history   Past Medical History:   Diagnosis Date    Diabetes (H)     gestational    Gestational diabetes 2022    Hyperlipidemia         She has a history of  gestational diabetes, insulin controlled and prior  section    Since her last LMP she denies use of alcohol, tobacco and street drugs.    Past medical, surgical, social and family history were reviewed and updated in [facility].      Current Outpatient Medications    Medication Sig Dispense Refill    alcohol swab prep pads Use to swab area of injection/dixie as directed 100 each 3    blood glucose (NO BRAND SPECIFIED) test strip Use to test blood sugar 1 time daily or as directed. To accompany: Blood Glucose Monitor Brands: per insurance. 100 strip 0    blood glucose monitoring (NO BRAND SPECIFIED) meter device kit Use to test blood sugar 1 times daily or as directed. 1 kit 0    thin (NO BRAND SPECIFIED) lancets Use to test blood sugar 1 time daily or as directed. To accompany: Blood Glucose Monitor Brands: per insurance. 100 each 0    fluticasone (FLONASE) 50 MCG/ACT nasal spray Spray 1 spray into both nostrils daily (Patient not taking: Reported on 7/15/2025) 11.1 mL 3    metFORMIN (GLUCOPHAGE XR) 500 MG 24 hr tablet TAKE ONE TABLET BY MOUTH TWICE A DAY WITH MEALS (Patient not taking: Reported on 7/15/2025) 180 tablet 0     No current facility-administered medications for this visit.       ROS:   12 point review of systems negative other than symptoms noted below or in the HPI.    Patient supplied answers from flow sheet for:  Prenatal OB Questionnaire.  Past Medical History  Have you ever recieved care for your mental health? : (Patient-Rptd) No  Have you ever been in a major accident or suffered serious trauma?: (Patient-Rptd) No  Within the last year, has anyone hit, slapped, kicked or otherwise hurt you?: (Patient-Rptd) No  In the last year, has anyone forced you to have sex when you didn't want to?: (Patient-Rptd) No    Past Medical History 2   Have you ever received a blood transfusion?: (Patient-Rptd) No  Would you accept a blood transfusion if was medically recommended?: (Patient-Rptd) Yes  Does anyone in your home smoke?: (Patient-Rptd) No   Is your blood type Rh negative?: (Patient-Rptd) Unknown  Have you ever ?: (!) (Patient-Rptd) Yes  Have you been hospitalized for a nonsurgical reason excluding normal delivery?: (Patient-Rptd) No  Have you ever had an  abnormal pap smear?: (Patient-Rptd) No    Past Medical History (Continued)  Do you have a history of abnormalities of the uterus?: (Patient-Rptd) Unknown  Did your mother take CED or any other hormones when she was pregnant with you?: (Patient-Rptd) No  Do you have any other problems we have not asked about which you feel may be important to this pregnancy?: (Patient-Rptd) No         PHQ-9 score:        6/24/2025     8:22 AM   PHQ   PHQ-9 Total Score 3    Q9: Thoughts of better off dead/self-harm past 2 weeks Not at all       Patient-reported           10/27/2020     7:18 PM 4/27/2023     8:07 AM 7/15/2025    12:17 PM   RORY-7 SCORE   Total Score 10 (moderate anxiety) 2 (minimal anxiety)    Total Score 10 2 4       RD OBGY NURSE EDUCATION      Clinic Phone Number: [phone]      Confirmed patient desires delivery at [facility] with the [facility] providers.     Nurse phone visit completed. Prenatal book and folder (containing standard educational hand-outs and brochures) will be given to patient on 07/16/25. Information in the folder reviewed over the phone, questions answered. Information given on optional screening available to assess chromosomal and genetic anomalies. Patient desires NIPT. Patient advised to call the clinic if she has any questions or concerns related to her pregnancy.     Prenatal labs, ultrasound and MFM referral orders placed.     Ultrasound appt: 07/29/25 at 10:40am with [facility]  New Prenatal Visit: 07/29/25 at 11:30am with Charissa Garcia MD    LPN informed patient to use [employer] messaging for non-urgent concerns, symptoms or questions.  [employer] messages are only answered Monday - Friday from 8:00am to 4:30pm.   Patient understands to call the clinic for any urgent or RED FLAG concerns or symptoms like:     -Excessive vomiting   -Vaginal bleeding   -Leakage of fluid   -Contractions   -Decreased fetal movement    Patient understands that education related to  pregnancy, medications safe in pregnancy are sent as a mychart message, as well as having several educational handouts in the After Visit Summary from today's appointment. Patient verbalized understanding, is in agreement with the plan, and voiced no further questions or concerns.     CARMELINA STEVENS, LPN on 7/17/2025 at 8:08 AM

## 2025-07-15 NOTE — PROGRESS NOTES
Diabetes Self-Management Education & Support    Presents for: Pre-Existing T2 Diabetes and Pregnancy    Type of service:  Video Visit    If the video visit is dropped, the video visit invitation should be resent by: Text to cell phone: [phone]    Originating Location (pt. Location): Other camping in [place]  Distant Location (provider location): Offsite  Mode of Communication:  Video Conference via Risk Ident    Video Start Time: 2:30 pm  Video End Time (time video stopped): 3:30 pm    How would patient like to obtain AVS? DecoSnap    Blood Glucose/Ketone Log:  No numbers to review today      Assessment  Hx GDM with daughter a few years ago - she is now 4.  On insulin in that pregnancy. Reports her daughter was not >9# when born. After pregnancy she was diagnosed with T2D. Has been on Metformin but reports not always taking it and forgetting.     Is a RN. Comfortable with insulin injections. Therefore, can continue with virtual visits vs inperson to show her how to do insulin.     Notices her BG spikes more easily with pregnancy. Has not resumed Metformin. Open to insulin. Explained that we would need to stop the Metformin by end of her first trimester even if she resumes it and she will transition to insulin.  Discussed just starting insulin and not resuming Metformin if her glucose is high which she is on board with.     She and her  were wondering about CGM vs BGM. He has some Celia sensors and she tried wearing one. She likes not poking her finger as much with the sensor. Explained that the CGM is recommended to be an adjunct to BGM in pregnancy. Explained that I would request she still write down her sensor numbers on the logbook to help determine fasting and meal times for interpretation. Since CGM is usually not covered if there is no insulin, will hold off on ordering it at this point until she needs insulin. She will check her glucose using her BGM but she needs more supplies for this. Will update  her BGM order to reflect 4x/d testing.     She remembers the meal plan from her pregnancy. Since she is still in the first trimester, discussed that if she does not want all 3 snacks right now that is okay.  Recommend she at least have 3 meals and a bedtime snack and aim for the recommended amount for carbs at each.      Opportunities for ongoing education and support in diabetes-self management were discussed.    PLAN:  Meal Plan Adjustments:  -- Meal Plan Recommendation: 30-45 carbs at breakfast, 45-60 carbs at lunch, 45-60 carbs at supper, 15-30 carbs at each of 3 snacks between meals.   -- Keep a food record for the next follow-up.  Exercise / activity plan: activity as able and walk for 5-15 minutes after meals  Monitoring:  -- Continue to check BG 4 times daily (fasting and one hour(s) after each meal).  -- She would like to do a CGM (elan 3+) if insulin is started. Waiting to send this Rx until she needs insulin.   Follow Up:  -- Follow-up in 1 week scheduled to review her BGs. If medication is indicated, recommend to begin insulin.   -- She is to send in her numbers if 3 or more high readings before next follow up.   -- OB ava on 7/29 for first visit. Once she sees OB, will request a new referral from OB.     Topics to cover at upcoming visits: Monitoring and Taking Medication    See Care Plan for co-developed, patient-stated behavior change goals.    Education Materials Provided:  Understanding GDM, GDM logbook, 1 week food log (all mailed to her)    Subjective/Objective  Presents for: Individual review  Accompanied by: Self, , 3 yo daughter (Edie) --> they were camping and all together for our visit but still in [place]  Diabetes education in the past 24mo: No  Focus of Visit: Monitoring  Diabetes type: Type 2  Disease course: Worsening  How confident are you filling out medical forms by yourself:: Extremely  Other concerns: English as a second language  Cultural Influences/Ethnic  Background:  Choose not to answer    Diabetes Symptoms & Complications  Diabetes Related Symptoms: Fatigue, Neuropathy, Polydipsia (increased thirst), Polyphagia (increased hunger)  Weight trend: Stable  Symptom course: Worsening  Disease course: Worsening     Patient Problem List and Family Medical History reviewed for relevant medical history, current medical status, and diabetes risk factors.    Vitals:  LMP 06/03/2025       Estimated Date of Delivery: Mar 10, 2026    Labs:  Lab Results   Component Value Date    A1C 7.6 06/24/2025    A1C 5.4 11/30/2020     Lab Results   Component Value Date     06/24/2025     Lab Results   Component Value Date    LDL  06/24/2025      Comment:      Cannot estimate LDL when triglyceride exceeds 400 mg/dL     06/24/2025     Direct Measure HDL   Date Value Ref Range Status   06/24/2025 33 (L) >=50 mg/dL Final     GFR Estimate   Date Value Ref Range Status   06/24/2025 >90 >60 mL/min/1.73m2 Final     Comment:     eGFR calculated using 2021 CKD-EPI equation.   06/02/2020 >90 >60 mL/min/[1.73_m2] Final     Comment:     Non  GFR Calc  Starting 12/18/2018, serum creatinine based estimated GFR (eGFR) will be   calculated using the Chronic Kidney Disease Epidemiology Collaboration   (CKD-EPI) equation.       GFR Estimate If Black   Date Value Ref Range Status   06/02/2020 >90 >60 mL/min/[1.73_m2] Final     Comment:      GFR Calc  Starting 12/18/2018, serum creatinine based estimated GFR (eGFR) will be   calculated using the Chronic Kidney Disease Epidemiology Collaboration   (CKD-EPI) equation.       Lab Results   Component Value Date    CR 0.64 06/24/2025    CR 0.47 06/02/2020     Lab Results   Component Value Date    MICROL 44.3 06/24/2025    UMALCR 24.34 06/24/2025    UCRR 182.0 06/24/2025       Last 3 BP:   BP Readings from Last 3 Encounters:   06/24/25 102/68   02/19/24 110/72   03/21/23 108/70       History   Smoking Status    Never    Smokeless Tobacco    Never         7/10/2025   Healthy Eating   Healthy Eating Assessed Today Yes   Cultural/Church diet restrictions? No   Meal planning/habits Smaller portions;Low carb   How many times a week on average do you eat food made away from home (restaurant/take-out)? 4   Meals include Breakfast;Lunch;Dinner   Breakfast more careful now since pregnant - avocado toast and water   Lunch white rice (has a half cup now instead of 2 c) with tofu or egg and vegetables (cabbage) or noodle (elma)   Dinner white rice (has a half cup now instead of 2 c) with tofu or egg and vegetables (cabbage) or noodle (elma)   Snacks not much - sometimes fruit like a banana or crackers   Beverages Water         7/10/2025   Being Active   Being Active Assessed Today No   Barrier to exercise Time         7/10/2025   Monitoring   Monitoring Assessed Today Yes   Blood Glucose Meter Accu-chek;CGM   Times checking blood sugar at home (number) 2   Times checking blood sugar at home (per) Week     Diabetes Medication(s)       Biguanides       metFORMIN (GLUCOPHAGE XR) 500 MG 24 hr tablet TAKE ONE TABLET BY MOUTH TWICE A DAY WITH MEALS     Patient not taking: Reported on 7/15/2025              7/10/2025   Taking Medications   Taking Medication Assessed Today Yes   Current Treatments Oral Medication (taken by mouth)         7/10/2025   Problem Solving   Problem Solving Assessed Today No           7/10/2025   Reducing Risks   Reducing Risks Assessed Today No   Has dilated eye exam at least once a year? Yes   Sees dentist every 6 months? Yes   Feet checked by healthcare provider in the last year? Yes         7/10/2025   Healthy Coping: Diabetes Distress Assessment   Healthy Coping Assessed Today Yes   I feel burned out by all of the attention and effort that diabetes demands of me. 4 - A Serious Problem   It bothers me that diabetes seems to control my life. 4 - A Serious Problem   I am frustrated that even when I do what I am supposed  to for my diabetes, it doesn't seem to make a difference. 4 - A Serious Problem   No matter how hard I try with my diabetes, it feels like it will never be good enough. 4 - A Serious Problem   I am so tired of having to worry about diabetes all the time. 4 - A Serious Problem   When it comes to my diabetes, I often feel like a failure. 4 - A Serious Problem   It depresses me when I realize that my diabetes will likely never go away. 4 - A Serious Problem   Living with diabetes is overwhelming for me. 4 - A Serious Problem   T2 DDAS Total Score (0 - 1.9 Little or no DD, 2.0 - 2.9 Moderate DD,  3.0+ High DD) 4    Informal Support system: Family;Spouse       Patient-reported       Any changes to above medications since becoming pregnant: has stopped Metformin    Care Plan and Education Provided:  Hormones in pregnancy cause rising insulin resistance & increased insulin needs throughout pregnancy requiring frequent follow-up and dose adjustments, up to 1-2 times/week., Risks and complications of elevated glucose and diabetes during pregnancy. Patient advised to discuss with OB if experiencing symptoms of complications., Healthy eating: appropriate carb intake, Monitoring: Frequency of monitoring and Glucose goals: Fasting glucose 70-94 mg/dL, and either 1-hour post-meal glucose less than 140 mg/dL, or 2-hour post-meal glucose less than 120 mg/dL, and Working with the care team (diabetes educator, Endocrinologist, OB/GYN to manage glucose during pregnancy)    Ambika Salinas, ANNE, EMEKA, [facility]  Time Spent: 60 minutes  Encounter Type: Individual    Diabetes medication dose changes were made via the [facility] Standing Orders under the patient's referring provider.

## 2025-07-15 NOTE — PATIENT INSTRUCTIONS
Learning About Pregnancy  Your Care Instructions     Your health in the early weeks of your pregnancy is particularly important for your baby's health. Take good care of yourself. Anything you do that harms your body can also harm your baby.  Make sure to go to all of your doctor appointments. Regular checkups will help keep you and your baby healthy.  How can you care for yourself at home?  Diet    Choose healthy foods like fruits, vegetables, whole grains, lean proteins, and healthy fats.     Choose foods that are good sources of calcium, iron, and folate. You can try dairy products, dark leafy greens, fortified orange juice and cereals, almonds, broccoli, dried fruit, and beans.     Do not skip meals or go for many hours without eating. If you are nauseated, try to eat a small, healthy snack every 2 to 3 hours.     Avoid fish that are high in mercury. These include shark, swordfish, yola mackerel, marlin, orange roughy, and bigeye tuna, as well as tilefish from the [facility].     It's okay to eat up to 8 to 12 ounces a week of fish that are low in mercury or up to 4 ounces a week of fish that have medium levels of mercury. Some fish that are low in mercury are salmon, shrimp, canned light tuna, cod, and tilapia. Some fish that have medium levels of mercury are halibut and white albacore tuna.     Drink plenty of fluids. If you have kidney, heart, or liver disease and have to limit fluids, talk with your doctor before you increase the amount of fluids you drink.     Limit caffeine to about 200 to 300 mg per day. On average, a cup of brewed coffee has around 80 to 100 mg of caffeine.     Do not drink alcohol, such as beer, wine, or hard liquor.     Take a multivitamin that contains at least 400 micrograms (mcg) of folic acid to help prevent birth defects. Fortified cereal and whole wheat bread are good additional sources of folic acid.     Increase the calcium in your diet. Try to drink a quart of skim milk  each day. You may also take calcium supplements and choose foods such as cheese and yogurt.   Lifestyle    Make sure you go to your follow-up appointments.     Get plenty of rest. You may be unusually tired while you are pregnant.     Get at least 30 minutes of exercise on most days of the week. Walking is a good choice. If you have not exercised in the past, start out slowly. Take several short walks each day.     Do not smoke. If you need help quitting, talk to your doctor about stop-smoking programs. These can increase your chances of quitting for good.     Do not touch cat feces or litter boxes. Also, wash your hands after you handle raw meat, and fully cook all meat before you eat it. Wear gloves when you work in the yard or garden, and wash your hands well when you are done. Cat feces, raw or undercooked meat, and contaminated dirt can cause an infection that may harm your baby or lead to a miscarriage.     Avoid things that can make your body too hot and may be harmful to your baby, such as a hot tub or sauna. Or talk with your doctor before doing anything that raises your body temperature. Your doctor can tell you if it's safe.     Avoid chemical fumes, paint fumes, or poisons.     Do not use illegal drugs, marijuana, or alcohol.   Medicines    Review all of your medicines with your doctor. Some of your routine medicines may need to be changed to protect your baby.     Use acetaminophen (Tylenol) to relieve minor problems, such as a mild headache or backache or a mild fever with cold symptoms. Do not use nonsteroidal anti-inflammatory drugs (NSAIDs), such as ibuprofen (Advil, Motrin) or naproxen (Aleve), unless your doctor says it is okay.     Do not take two or more pain medicines at the same time unless the doctor told you to. Many pain medicines have acetaminophen, which is Tylenol. Too much acetaminophen (Tylenol) can be harmful.     Take your medicines exactly as prescribed. Call your doctor if you  "think you are having a problem with your medicine.   To manage morning sickness    Keep food in your stomach, but not too much at once. Try eating five or six small meals a day instead of three large meals.     For nausea when you wake up, eat a small snack, such as a couple of crackers or pretzels, before rising. Allow a few minutes for your stomach to settle before you slowly get up.     Try to avoid smells and foods that make you feel nauseated. High-fat or greasy foods, milk, and coffee may make nausea worse. Some foods that may be easier to tolerate include cold, spicy, sour, and salty foods.     Drink enough fluids. Water and other caffeine-free drinks are good choices.     Take your prenatal vitamins at night on a full stomach.     Try foods and drinks made with gwendolyn. Gwendolyn may help with nausea.     Get lots of rest. Morning sickness may be worse when you are tired.     Talk to your doctor about over-the-counter products, such as vitamin B6 or doxylamine, to help relieve symptoms.     Try a P6 acupressure wrist band. These anti-nausea wristbands help some people.   Follow-up care is a key part of your treatment and safety. Be sure to make and go to all appointments, and call your doctor if you are having problems. It's also a good idea to know your test results and keep a list of the medicines you take.  Where can you learn more?  Go to [email or web address]  Enter E868 in the search box to learn more about \"Learning About Pregnancy.\"  Current as of: April 30, 2024  Content Version: 14.5    7199-6630 [employer].   Care instructions adapted under license by your healthcare professional. If you have questions about a medical condition or this instruction, always ask your healthcare professional. [employer] disclaims any warranty or liability for your use of this information.    Weeks 6 to 10 of Your Pregnancy: Care Instructions  During these weeks of pregnancy, your " "body goes through many changes. You may start to feel different, both in your body and your emotions. Each pregnancy is different, so there's no \"right\" way to feel. These early weeks are a time to make healthy choices for you and your pregnancy.    Take a daily prenatal vitamin. Choose one with folic acid in it.   Avoid alcohol, tobacco, and drugs (including marijuana). If you need help quitting, talk to your doctor.     Drink plenty of liquids.  Be sure to drink enough water. And limit sodas, other sweetened drinks, and caffeine.     Choose foods that are good sources of calcium, iron, and folate.  You can try dairy products, dark leafy greens, fortified orange juice and cereals, almonds, broccoli, dried fruit, and beans.     Avoid foods that may be harmful.  Don't eat raw meat, deli meat, raw seafood, or raw eggs. Avoid soft cheese and unpasteurized dairy, like Brie and blue cheese. And don't eat fish that contains a lot of mercury, like shark and swordfish.     Don't touch serenity litter or cat poop.  They can cause an infection that could be harmful during pregnancy.     Avoid things that can make your body too hot.  For example, avoid hot tubs and saunas.     Soothe morning sickness.  Try eating 5 or 6 small meals a day, getting some fresh air, or using shu to control symptoms.     Ask your doctor about flu and COVID-19 shots.  Getting them can help protect against infection.   Follow-up care is a key part of your treatment and safety. Be sure to make and go to all appointments, and call your doctor if you are having problems. It's also a good idea to know your test results and keep a list of the medicines you take.  Where can you learn more?  Go to https://www.Primesport.net/[email or web address]  Enter G112 in the search box to learn more about \"Weeks 6 to 10 of Your Pregnancy: Care Instructions.\"  Current as of: April 30, 2024  Content Version: 14.5    9875-2681 [facility] [employer].   Care instructions adapted " under license by your healthcare professional. If you have questions about a medical condition or this instruction, always ask your healthcare professional. [employer] disclaims any warranty or liability for your use of this information.       Pregnancy: Managing Morning Sickness (01:48)  Your health professional recommends that you watch this short online health video.  Learn how to manage morning sickness during pregnancy.   Purpose: Learn how to manage morning sickness during pregnancy.  Goal: Learn how to manage morning sickness during pregnancy.    Watch: Scan the QR code or visit the link to view video       [email or web address].[email or web address]  Current as of: April 30, 2024  Content Version: 14.5    4585-9737 [employer].   Care instructions adapted under license by your healthcare professional. If you have questions about a medical condition or this instruction, always ask your healthcare professional. [employer] disclaims any warranty or liability for your use of this information.    Pregnancy and Heartburn: Care Instructions  Overview     Heartburn is a common problem during pregnancy.  Heartburn happens when stomach acid backs up into the tube that carries food to the stomach. This tube is called the esophagus. Early in pregnancy, heartburn is caused by hormone changes that slow down digestion. Later on, it's also caused by the large uterus pushing up on the stomach.  Even though you can't fix the cause, there are things you can do to get relief. Treating heartburn during pregnancy focuses first on making lifestyle changes, like changing what and how you eat, and on taking medicines.  Heartburn usually improves or goes away after childbirth.  Follow-up care is a key part of your treatment and safety. Be sure to make and go to all appointments, and call your doctor if you are having problems. It's also a good idea to know your test results and keep a list of the medicines you  "take.  How can you care for yourself at home?  Eat small, frequent meals.  Avoid foods that make your symptoms worse, such as chocolate, peppermint, and spicy foods. Avoid drinks with caffeine, such as coffee, tea, and sodas.  Avoid bending over or lying down after meals.  Take a short walk after you eat.  If heartburn is a problem at night, do not eat for 2 hours before bedtime.  Take antacids like Mylanta, Maalox, Rolaids, or Tums. Do not take antacids that have sodium bicarbonate, magnesium trisilicate, or aspirin. Be careful when you take over-the-counter antacid medicines. Many of these medicines have aspirin in them. While you are pregnant, do not take aspirin or medicines that contain aspirin unless your doctor says it is okay.  If you're not getting relief, talk to your doctor. You may be able to take a stronger acid-reducing medicine.  When should you call for help?   Call your doctor now or seek immediate medical care if:    You have new or worse belly pain.     You are vomiting.   Watch closely for changes in your health, and be sure to contact your doctor if:    You have new or worse symptoms of reflux.     You are losing weight.     You have trouble or pain swallowing.     You do not get better as expected.   Where can you learn more?  Go to https://[email or web address]/[email or web address]  Enter U946 in the search box to learn more about \"Pregnancy and Heartburn: Care Instructions.\"  Current as of: April 30, 2024  Content Version: 14.5 2024-2025 [employer].   Care instructions adapted under license by your healthcare professional. If you have questions about a medical condition or this instruction, always ask your healthcare professional. [employer] disclaims any warranty or liability for your use of this information.    Constipation: Care Instructions  Overview     Constipation means that you have a hard time passing stools (bowel movements). People pass stools from 3 times a day to " once every 3 days. What is normal for you may be different. Constipation may occur with pain in the rectum and cramping. The pain may get worse when you try to pass stools. Sometimes there are small amounts of bright red blood on toilet paper or the surface of stools. This is because of enlarged veins near the rectum (hemorrhoids).  A few changes in your diet and lifestyle may help you avoid ongoing constipation. Your doctor may also prescribe medicine to help loosen your stool.  Some medicines can cause constipation. These include pain medicines and antidepressants. Tell your doctor about all the medicines you take. Your doctor may want to make a medicine change to ease your symptoms.  Follow-up care is a key part of your treatment and safety. Be sure to make and go to all appointments, and call your doctor if you are having problems. It's also a good idea to know your test results and keep a list of the medicines you take.  How can you care for yourself at home?  Drink plenty of fluids. If you have kidney, heart, or liver disease and have to limit fluids, talk with your doctor before you increase the amount of fluids you drink.  Include high-fiber foods in your diet each day. These include fruits, vegetables, beans, and whole grains.  Get at least 30 minutes of exercise on most days of the week. Walking is a good choice. You also may want to do other activities, such as running, swimming, cycling, or playing tennis or team sports.  Take a fiber supplement, such as Citrucel or Metamucil, every day. Read and follow all instructions on the label.  Schedule time each day for a bowel movement. A daily routine may help. Take your time having a bowel movement, but don't sit for more than 10 minutes at a time. And don't strain too much.  Support your feet with a small step stool when you sit on the toilet. This helps flex your hips and places your pelvis in a squatting position.  Your doctor may recommend an  "over-the-counter laxative to relieve your constipation. Examples are Milk of Magnesia and MiraLax. Read and follow all instructions on the label. Do not use laxatives on a long-term basis.  When should you call for help?   Call your doctor now or seek immediate medical care if:    You have new or worse belly pain.     You have new or worse nausea or vomiting.     You have blood in your stools.   Watch closely for changes in your health, and be sure to contact your doctor if:    Your constipation is getting worse.     You do not get better as expected.   Where can you learn more?  Go to https://www.readeo.net/[email or web address]  Enter P343 in the search box to learn more about \"Constipation: Care Instructions.\"  Current as of: October 19, 2024  Content Version: 14.5 2024-2025 [employer].   Care instructions adapted under license by your healthcare professional. If you have questions about a medical condition or this instruction, always ask your healthcare professional. [employer] disclaims any warranty or liability for your use of this information.    Learning About High-Iron Foods  What foods are high in iron?     The foods you eat contain nutrients, such as vitamins and minerals. Iron is a nutrient. Your body needs the right amount to stay healthy and work as it should. You can use the list below to help you make choices about which foods to eat.  Here are some foods that contain iron. They have 1 to 2 milligrams of iron per serving.  Fruits  Figs (dried), 5 figs  Vegetables  Asparagus (canned), 6 [place]  [place], beet, Swiss chard, or turnip greens, 1 cup  Dried peas, cooked,   cup  Seaweed, spirulina (dried),   cup  Spinach, (cooked)   cup or (raw) 1 cup  Grains  Cereals, fortified with iron, 1 cup  Grits (instant, cooked), fortified with iron,   cup  Meats and other protein foods  Beans (kidney, lima, navy, white), canned or cooked,   cup  Beef or lamb, 3 oz  Chicken giblets, 3 " "oz  Chickpeas (garbanzo beans),   cup  Liver of beef, lamb, or pork, 3 oz  Oysters (cooked), 3 oz  Sardines (canned), 3 oz  Soybeans (boiled),   cup  Tofu (firm),   cup  Work with your doctor to find out how much of this nutrient you need. Depending on your health, you may need more or less of it in your diet.  Where can you learn more?  Go to https://www.BleepBleeps.net/[email or web address]  Enter R005 in the search box to learn more about \"Learning About High-Iron Foods.\"  Current as of: October 7, 2024  Content Version: 14.5 2024-2025 [employer].   Care instructions adapted under license by your healthcare professional. If you have questions about a medical condition or this instruction, always ask your healthcare professional. [employer] disclaims any warranty or liability for your use of this information.    Rh Antibodies Screening During Pregnancy: About This Test  What is it?     The Rh antibodies screening test is a blood test. It checks your blood for Rh antibodies. If you have Rh-negative blood and have been exposed to Rh-positive blood, your immune system may make antibodies to attack the Rh-positive blood. When a pregnant woman has these antibodies, it is called Rh sensitization.  Why is this test done?  The Rh antibodies screening test is done during pregnancy to find out if your baby is at risk for Rh disease. This can happen if you have Rh-negative blood and your baby has Rh-positive blood. If your Rh-negative blood mixes with Rh-positive blood, your immune system will make antibodies to attack the Rh-positive blood.  During pregnancy, these antibodies could attach to the baby's red blood cells. This can cause your baby to have serious health problems. The results of this test will help your doctor know how to best care for you and your baby during your pregnancy.  How do you prepare for the test?  In general, there's nothing you have to do before this test, unless your doctor tells you " "to.  How is the test done?  A health professional uses a needle to take a blood sample, usually from the arm.  What happens after the test?  You will probably be able to go home right away. It depends on the reason for the test.  You can go back to your usual activities right away.  Follow-up care is a key part of your treatment and safety. Be sure to make and go to all appointments, and call your doctor if you are having problems. It's also a good idea to keep a list of the medicines you take. Ask your doctor when you can expect to have your test results.  Where can you learn more?  Go to https://www.Amp[employer]us.net/[email or web address]  Enter P722 in the search box to learn more about \"Rh Antibodies Screening During Pregnancy: About This Test.\"  Current as of: 2024  Content Version: 14.5    9444-3747 [employer].   Care instructions adapted under license by your healthcare professional. If you have questions about a medical condition or this instruction, always ask your healthcare professional. [employer] disclaims any warranty or liability for your use of this information.    Learning About Preventing Rh Disease  What is Rh disease?    Rh disease can be a serious problem in pregnancy. It happens when substances called antibodies in the mother's blood cause red blood cells in her baby's blood to be destroyed. This can occur when the blood types of a mother and her baby do not match.  All blood has an Rh factor. This is what makes a blood type positive or negative. When you are Rh-negative, your baby may be Rh-negative or Rh-positive. If your baby has Rh-positive blood and it mixes with yours, your body will make antibodies. This is called Rh sensitization.  Most of the time, this is not a problem in a first pregnancy. But in future pregnancies, it could cause Rh disease.  A  with Rh disease has mild anemia and may have jaundice. In severe cases, anemia, jaundice, and swelling can be " "very dangerous or fatal. Some babies need to be delivered early. Some need special care in the NICU. A very sick baby will need a blood transfusion before or after birth.  Fortunately, Rh sensitization is usually easy to prevent.  That's why it's important to get your Rh status checked in your first trimester. It doesn't cause any warning signs. A blood test is the only way to know if you are Rh-sensitive or are at risk for it.  How can you prevent Rh disease?  If you are Rh-negative, an Rh immune globulin shot (such as RhoGAM) can help prevent your body from making the antibodies that attack your baby's red blood cells.  Timing is important. The shot is given at certain times during your pregnancy. And it is given anytime there is a chance that your baby's blood might mix with yours. That can happen with certain prenatal tests or when you have pregnancy bleeding, such as:  Right after any pregnancy loss, amniocentesis, or CVS testing.  After turning of a breech baby.  Before and maybe after childbirth. If you need the shot, it is given around week 28. If your  is Rh-positive, you will have another shot after birth.  Follow-up care is a key part of your treatment and safety. Be sure to make and go to all appointments, and call your doctor if you are having problems. It's also a good idea to know your test results and keep a list of the medicines you take.  Where can you learn more?  Go to https://www.[employer].net/patiented  Enter W177 in the search box to learn more about \"Learning About Preventing Rh Disease.\"  Current as of: 2024  Content Version: 14.5    9902-4280 [employer].   Care instructions adapted under license by your healthcare professional. If you have questions about a medical condition or this instruction, always ask your healthcare professional. [employer] disclaims any warranty or liability for your use of this information.    Learning About Rh " Immunoglobulin Shots  Introduction    An Rh immunoglobulin shot is given during pregnancy to prevent Rh sensitization. Rh sensitization can happen if you have Rh-negative blood and your baby has Rh-positive blood. If the two types of blood mix, your body will make antibodies against your baby's blood. Most of the time, this is not a problem the first time you're pregnant. But it could cause problems in future pregnancies.  This shot keeps your body from making the antibodies. If you need the shot, it is given around 28 weeks of pregnancy. After the birth, your baby's blood is tested. If the blood is Rh positive, you will receive another shot. The shot may also be given if you have vaginal bleeding while you are pregnant or if you have a miscarriage. These shots protect future pregnancies.  Example  Rh immunoglobulin (HypRho-D, MICRhoGAM, and RhoGAM)  Possible side effects  Rare side effects may include:  Some mild pain where you got the shot.  A slight fever.  An allergic reaction.  You may have other side effects not listed here. Check the information that comes with your medicine.  What to know about taking this medicine  You may need more than one shot. You may need the shot again:  After amniocentesis, fetal blood sampling, or chorionic villus sampling tests.  If you have bleeding in your second or third trimester.  After turning of a breech baby.  After an injury to the belly while you are pregnant.  After a miscarriage or an .  Before or right after treatment for an ectopic or a partial molar pregnancy.  Tell your doctor if you have any allergies or have had a bad response to medicines in the past.  If you get this shot within 3 months of getting a live-virus vaccine, the vaccine may not work. Your doctor will tell you if you need more vaccine.  Check with your doctor or pharmacist before you use any other medicines. This includes over-the-counter medicines. Make sure your doctor knows all of the  "medicines, vitamins, herbs, and supplements you take. Taking some medicines at the same time can cause problems.  Where can you learn more?  Go to https://www.[employer]ex.net/patiented  Enter V615 in the search box to learn more about \"Learning About Rh Immunoglobulin Shots.\"  Current as of: April 30, 2024  Content Version: 14.5 2024-2025 [employer].   Care instructions adapted under license by your healthcare professional. If you have questions about a medical condition or this instruction, always ask your healthcare professional. [employer] disclaims any warranty or liability for your use of this information.    Rubella ([place] Measles): Care Instructions  Overview  Rubella, also called [place] measles or 3-day measles, is a disease caused by a virus. It spreads by coughs, sneezes, and close contact. Rubella usually is mild and does not cause long-term problems. But if you are pregnant and get it, you can give the disease to your unborn baby. This can cause serious birth defects.  While you have rubella, you may get a rash and a mild fever, and the lymph glands in your neck may swell. Older children often have a fever, eye pain, a sore throat, and body aches. You can relieve most symptoms with care at home. Avoid being around others, especially pregnant people, until your rash has been gone for at least 4 days. People who have not had this disease before or have not had the vaccine have the greatest chance of getting the virus.  Follow-up care is a key part of your treatment and safety. Be sure to make and go to all appointments, and call your doctor if you are having problems. It's also a good idea to know your test results and keep a list of the medicines you take.  How can you care for yourself at home?  Drink plenty of fluids. If you have kidney, heart, or liver disease and have to limit fluids, talk with your doctor before you increase the amount of fluids you drink.  Get plenty of " "rest to help your body heal.  Take an over-the-counter pain medicine, such as acetaminophen (Tylenol), ibuprofen (Advil, Motrin), or naproxen (Aleve), to reduce fever and discomfort. Read and follow all instructions on the label. Do not give aspirin to anyone younger than 20. It has been linked to Reye syndrome, a serious illness.  Do not take two or more pain medicines at the same time unless the doctor told you to. Many pain medicines have acetaminophen, which is Tylenol. Too much acetaminophen (Tylenol) can be harmful.  Try not to scratch the rash. Put cold, wet cloths on the rash to reduce itching.  Do not smoke. Smoking can make your symptoms worse. If you need help quitting, talk to your doctor about stop-smoking programs and medicines. These can increase your chances of quitting for good.  Avoid contact with people who have never had rubella and who have not been immunized.  When should you call for help?   Call your doctor now or seek immediate medical care if:    You have a fever with a stiff neck or a severe headache.     You are sensitive to light or feel very sleepy or confused.   Watch closely for changes in your health, and be sure to contact your doctor if:    You do not get better as expected.   Where can you learn more?  Go to https://www.[employer].net/patiented  Enter B812 in the search box to learn more about \"Rubella ([place] Measles): Care Instructions.\"  Current as of: April 30, 2024  Content Version: 14.5 2024-2025 [employer].   Care instructions adapted under license by your healthcare professional. If you have questions about a medical condition or this instruction, always ask your healthcare professional. [employer] disclaims any warranty or liability for your use of this information.    Gonorrhea and Chlamydia: About These Tests  What is it?  These tests use a sample of urine or other body fluid to look for the bacteria that cause these sexually transmitted " "infections (STIs). The fluid sample can come from the cervix, vagina, rectum, throat, or eyes.  Why is this test done?  These tests may be done to:  Find out if symptoms are caused by gonorrhea or chlamydia.  Check people who are at high risk of being infected with gonorrhea or chlamydia.  Retest people several months after they have been treated for gonorrhea or chlamydia.  Check for infection in your  if you had a gonorrhea or chlamydia infection at the time of delivery.  How can you prepare for the test?  If you are going to have a urine test, do not urinate for at least 1 hour before the test.  If you think you may have chlamydia or gonorrhea, don't have sexual intercourse until you get your test results. And you may want to have tests for other STIs, such as HIV.  How is the test done?  For a direct sample, a swab is used to collect body fluid from the cervix, vagina, rectum, throat, or eyes. Your doctor may collect the sample. Or you may be given instructions on how to collect your own sample.  For a urine sample, you will collect the urine that comes out when you first start to urinate. Don't wipe the genital area clean before you urinate.  How long does the test take?  The test will take a few minutes.  What happens after the test?  You will be able to go home right away.  You can go back to your usual activities right away.  If you do have an infection, don't have sexual intercourse for 7 days after you start treatment. And your sex partner(s) should also be treated.  Follow-up care is a key part of your treatment and safety. Be sure to make and go to all appointments, and call your doctor if you are having problems. It's also a good idea to keep a list of the medicines you take. Ask your doctor when you can expect to have your test results.  Where can you learn more?  Go to https://www.healthwise.net/patiented  Enter K976 in the search box to learn more about \"Gonorrhea and Chlamydia: About These " "Tests.\"  Current as of: April 30, 2024  Content Version: 14.5    3031-6624 [email or web address].   Care instructions adapted under license by your healthcare professional. If you have questions about a medical condition or this instruction, always ask your healthcare professional. [employer] disclaims any warranty or liability for your use of this information.    Trichomoniasis: About This Test  What is it?     This test uses a sample of urine or other body fluid to look for the tiny parasite that causes trichomoniasis (also called trich). The fluid sample can come from the vagina, cervix, or urethra. Your doctor may choose to use one or more of many available tests.  Why is it done?  A trich test may be done to:  Find out if symptoms are caused by trich.  Check people who are at high risk for being infected with trich.  Check after treatment to make sure that the infection is gone.  How do you prepare for the test?  If you are going to have a urine test, do not urinate for at least 1 hour before the test.  How is the test done?  For a direct sample, a swab is used to collect body fluid from the cervix, vagina, or urethra. Your doctor may collect the sample. Or you may be given instructions on how to collect your own sample.  For a urine sample, you will collect the urine that comes out when you first start to urinate. Don't wipe the area clean before you urinate.  How long does the test take?  It will take a few minutes to collect a sample.  What happens after the test?  You can go home right away.  You can go back to your usual activities right away.  You may get the test results the same day or several days later. It depends on the test used.  If you do have an infection, don't have sexual intercourse for 7 days after you start treatment. Your sex partner or partners should also be treated.  Follow-up care is a key part of your treatment and safety. Be sure to make and go to all appointments, and " call your doctor if you are having problems. Ask your doctor when you can expect to have your test results.  Current as of: April 30, 2024  Content Version: 14.5    4115-9541 [employer].   Care instructions adapted under license by your healthcare professional. If you have questions about a medical condition or this instruction, always ask your healthcare professional. [employer] disclaims any warranty or liability for your use of this information.    HIV Testing: Care Instructions  Overview  You can get tested for the human immunodeficiency virus (HIV). Most doctors use a blood test to check for HIV antibodies and antigens in your blood. It may also check for the genetic material (RNA) of HIV. Some tests use saliva to check for HIV antibodies. But these aren't as accurate. For example, they may give a false result if you've just been infected.  What do the results mean?        Normal (negative)   No HIV antibodies, antigens, or RNA were found.  You may need more testing. It can make sure your test results are correct.        Uncertain (indeterminate)   Test results didn't clearly show if you have an HIV infection.  HIV antibodies or antigens may not have formed yet.  Some other type of antibody or antigen may have affected the results.  You will need another test to be sure.        Abnormal (positive)   HIV antibodies, antigens, or RNA were found.  If you haven't had an RNA test yet, one will be done. If it's positive, you have HIV.  If your test result is positive, your doctor will talk to you. You will discuss starting treatment.  Follow-up care is a key part of your treatment and safety. Be sure to make and go to all appointments, and call your doctor if you are having problems. It's also a good idea to know your test results and keep a list of the medicines you take.  Where can you learn more?  Go to https://www.[employer].net/patiented  Enter T792 in the search box to learn more about  "\"HIV Testing: Care Instructions.\"  Current as of: April 30, 2024  Content Version: 14.5    2274-5372 [employer].   Care instructions adapted under license by your healthcare professional. If you have questions about a medical condition or this instruction, always ask your healthcare professional. [employer] disclaims any warranty or liability for your use of this information.    Hepatitis C Virus Tests: About These Tests  What are they?     Hepatitis C virus tests are blood tests that check for substances in the blood that show whether you have hepatitis C now or had it in the past. The tests can also tell you what type of hepatitis C you have and how severe the disease is. This can help your doctor with treatment.  If the tests show that you have long-term hepatitis C, you need to take steps to prevent spreading the disease.  Why are these tests done?  You may need these tests if:  You have symptoms of hepatitis.  You may have been exposed to the virus. You are more likely to have been exposed to the virus if you inject drugs or are exposed to body fluids (such as if you are a health care worker).  You've had other tests that show you have liver problems.  You are 18 to 79 years old.  You have an HIV infection.  The tests also are done to help your doctor decide about your treatment and see how well it works.  How do you prepare for the test?  In general, there's nothing you have to do before this test, unless your doctor tells you to.  How is the test done?  A health professional uses a needle to take a blood sample, usually from the arm.  What happens after these tests?  You will probably be able to go home right away.  You can go back to your usual activities right away.  Follow-up care is a key part of your treatment and safety. Be sure to make and go to all appointments, and call your doctor if you are having problems. It's also a good idea to keep a list of the medicines you take. Ask " "your doctor when you can expect to have your test results.  Where can you learn more?  Go to https://www.iLink.net/patiented  Enter W551 in the search box to learn more about \"Hepatitis C Virus Tests: About These Tests.\"  Current as of: April 30, 2024  Content Version: 14.5    0182-1394 [employer].   Care instructions adapted under license by your healthcare professional. If you have questions about a medical condition or this instruction, always ask your healthcare professional. [employer] disclaims any warranty or liability for your use of this information.    Learning About Fetal Ultrasound Results  What is a fetal ultrasound?     Fetal ultrasound is a test that lets your doctor see an image of your baby. Your doctor learns information about your baby from this picture. You may find out, for example, if you are having a boy or a girl. But the main reason you have this test is to get information about your baby's health.  (You may hear your baby called a fetus. This is a common medical term for a baby that's growing in the mother's uterus.)  What kind of information can you learn from this test?  The findings of an ultrasound fall into two categories, normal and abnormal.  Normal  The fetus is the right size for its age.  The placenta is the expected size and does not cover the cervix.  There is enough amniotic fluid in the uterus.  No birth defects can be seen.  Abnormal  The fetus is small or large for its age.  The placenta covers the cervix.  There is too much or too little amniotic fluid in the uterus.  The fetus may have a birth defect.  What does an abnormal result mean?  Abnormal seems to imply that something is wrong with your baby. But what it means is that the test has shown something the doctor wants to take a closer look at.  And that's what happens next. Your doctor will talk to you about what further test or tests you may need.  What do the results mean?  Some of the " things your doctor may see on an abnormal ultrasound include:  Echogenic bowel.  The bowel looks very bright on the screen. This could mean that there's blood in the bowel. Or it could mean that something is blocking the small bowel.  Increased nuchal translucency.  The ultrasound measures the thickness at the back of the baby's neck. An increase in thickness is sometimes an early sign of Down syndrome.  Increased or decreased amniotic fluid.  The doctor will look for a reason for the level of amniotic fluid and will watch the pregnancy closely as it progresses.  Large ventricles.  Ventricles in the brain look larger than they should. Your doctor may take a closer look at the brain.  Renal pyelectasis/hydronephrosis.  The ultrasound measures the fluid around the kidney. If there is more fluid than expected, there is a chance of urinary tract or kidney problems.  Short long bones.  The ultrasound measures certain arm and leg bones. A long bone (humerus or femur) that is shorter than average could be a sign of Down syndrome.  Subchorionic hemorrhage.  An ultrasound can show bleeding under one of the membranes that surrounds the fetus. Some women don't have symptoms of bleeding. The ultrasound can find this problem when women are not bleeding from their vagina. Women who have this condition have a slightly higher chance of miscarriage.  What do you do now?  Take a deep breath, and let it out. Keep in mind that an abnormal finding on an ultrasound, after it's coupled with more information, may:  Turn out to be nothing.  Turn out to be something mild that won't affect the baby.  Turn out to be something more serious. But if this happens, early diagnosis helps you and your doctor plan treatment options sooner rather than later.  Your medical team is there for you. So are your family and friends. Ask questions, and get the help and support you need.  Follow-up care is a key part of your treatment and safety. Be sure to  "make and go to all appointments, and call your doctor if you are having problems. It's also a good idea to know your test results and keep a list of the medicines you take.  Where can you learn more?  Go to https://www.[employer].net/patiented  Enter K451 in the search box to learn more about \"Learning About Fetal Ultrasound Results.\"  Current as of: April 30, 2024  Content Version: 14.5    2073-6999 [employer].   Care instructions adapted under license by your healthcare professional. If you have questions about a medical condition or this instruction, always ask your healthcare professional. [employer] disclaims any warranty or liability for your use of this information.    Learning About Prenatal Visits  Overview     Regular prenatal visits are very important during any pregnancy. These quick office visits may seem simple and routine. But they can help you have a safe and healthy pregnancy. Your doctor is watching for problems that can only be found through regular checkups. The visits also give you and your doctor time to build a good relationship.  After your first visit, you will most likely start on a schedule of monthly visits. In your third trimester, the visits will get more frequent. Based on your health, your age, and if you've had a normal, full-term pregnancy before, your doctor may want to see you more or less often.  At different times in your pregnancy, you will have exams and tests. Some are routine. Others are done only when there is a chance of a problem. Everything healthy you do for your body helps you have a healthy pregnancy. Rest when you need it. Eat well, drink plenty of water, and exercise regularly.  What happens during a prenatal visit?  You will have blood pressure checks, along with urine tests. You also may have blood tests. If you need to go to the bathroom while waiting for the doctor, tell the nurse. You will be given a sample cup so your urine can be " tested.  You will be weighed and have your belly measured.  Your doctor may listen to the fetal heartbeat with a special device.  At about 24 weeks, and possibly earlier in your pregnancy, your doctor will check your blood sugar (glucose tolerance test) for diabetes that can occur during pregnancy. This is gestational diabetes, which can be harmful.  You will have tests to check for infections that could harm your . These include group B streptococcus and hepatitis B.  Your doctor may do ultrasounds to check for problems. This also checks the position of the fetus. An ultrasound uses sound waves to produce a picture of the fetus.  You may get your vaccines updated.  Your doctor may ask you questions to check for signs of anxiety or depression. Tell your doctor if you feel sad, anxious, or hopeless for more than a few days.  You may have other tests at any time during your pregnancy.  Use your visits to discuss with your doctor any concerns you have.  How can you care for yourself at home?  Get plenty of rest.  Try to exercise every day, if your doctor says it is okay. If you have not exercised in the past, start out slowly. For example, you can take short walks each day.  Choose healthy foods, such as fruits, vegetables, whole grains, lean proteins, low-fat dairy, and healthy fats.  Drink plenty of fluids. Cut down on drinks with caffeine, such as coffee, tea, and cola. If you have kidney, heart, or liver disease and have to limit fluids, talk with your doctor before you increase the amount of fluids you drink.  Try to avoid chemical fumes, paint fumes, and poisons.  If you smoke, vape, or use alcohol, marijuana, or other drugs, quit or cut back as much as you can. Talk to your doctor if you need help quitting.  Review all of your medicines, including over-the-counter medicines and supplements, with your doctor. Some of your routine medicines may need to be changed. Do not stop or start taking any medicines  "without talking to your doctor first.  Follow-up care is a key part of your treatment and safety. Be sure to make and go to all appointments, and call your doctor if you are having problems. It's also a good idea to know your test results and keep a list of the medicines you take.  Where can you learn more?  Go to https://www.[employer].net/patiented  Enter J502 in the search box to learn more about \"Learning About Prenatal Visits.\"  Current as of: April 30, 2024  Content Version: 14.5    4455-2296 [employer].   Care instructions adapted under license by your healthcare professional. If you have questions about a medical condition or this instruction, always ask your healthcare professional. [employer] disclaims any warranty or liability for your use of this information.    Intimate Partner Violence: Care Instructions  Overview     If you want to save this information but don't think it is safe to take it home, see if a trusted friend can keep it for you. Plan ahead. Know who you can call for help, and memorize the phone number.   Be careful online too. Your online activity may be seen by others. Do not use your personal computer or device to read about this topic. Use a safe computer, such as one at work, a friend's home, or a library.    Intimate partner violence--a type of domestic abuse--is different from an argument now and then. It is a pattern of abuse that one person may use to control another person's behavior. It may start with threats and name-calling. Then, it may lead to more serious acts, like pushing and slapping. The abuse also may occur in other areas. For example, the abuser may withhold money or spend a partner's money without their knowledge.  Abuse can cause serious harm. You are more likely to have a long-term health problem from the injuries and stress of living in a violent relationship. People who are sexually abused by their partners have more sexually transmitted " "infections and unplanned pregnancies. Anyone who is abused also faces emotional pain. Anyone can be abused in relationships. In some relationships, both people use abusive behavior.  If you are pregnant, abuse can cause problems such as poor weight gain, infections, and bleeding. Abuse during this time may increase your baby's risk of low birth weight, premature birth, and death.  Follow-up care is a key part of your treatment and safety. Be sure to make and go to all appointments, and call your doctor if you are having problems. It's also a good idea to know your test results and keep a list of the medicines you take.  How can you care for yourself at home?  If you do not have a safe place to stay, discuss this with your doctor before you leave.  Have a plan for where to go, how to leave your home, and where to stay in case of an emergency. Do not tell your partner about your plan. Contact:  The National Domestic Violence Hotline toll-free at 1-687.618.2916. They can help you find resources in your area.  Your local police department, hospital, or clinic for information about shelters and safe homes near you.  Talk to a trusted friend or neighbor, a counselor, or a lobito leader. Do not feel that you have to hide what happened.  Teach your children how to call for help in an emergency.  Be alert to warning signs, such as threats, heavy alcohol use, or drug use. This can help you avoid danger.  If you can, make sure that there are no guns or other weapons in your home.  When should you call for help?   Call 911 anytime you think you may need emergency care. For example, call if:    You or someone else has just been abused.     You think you or someone else is in danger of being abused.   Watch closely for changes in your health, and be sure to contact your doctor if you have any problems.  Where can you learn more?  Go to https://www.healthwise.net/patiented  Enter G282 in the search box to learn more about \"Intimate " "Partner Violence: Care Instructions.\"  Current as of: July 31, 2024  Content Version: 14.5    7309-1242 [employer].   Care instructions adapted under license by your healthcare professional. If you have questions about a medical condition or this instruction, always ask your healthcare professional. [employer] disclaims any warranty or liability for your use of this information.    Intimate Partner Violence Safety Instructions: Care Instructions  Overview     If you want to save this information but don't think it is safe to take it home, see if a trusted friend can keep it for you. Plan ahead. Know who you can call for help, and memorize the phone number.   Be careful online too. Your online activity may be seen by others. Do not use your personal computer or device to read about this topic. Use a safe computer, such as one at work, a friend's home, or a library.    When you are abused by a spouse or partner, you can take actions to protect yourself and your children.  You can increase your safety whether you decide to stay with your spouse or partner or you decide to leave. You may want to make a safety plan and pack a bag ahead of time. This will help you leave quickly when you decide to. Remember, you cannot change your partner's actions, but you can find help for you and your children. No one deserves to be abused.  Follow-up care is a key part of your treatment and safety. Be sure to make and go to all appointments, and call your doctor if you are having problems. It's also a good idea to know your test results and keep a list of the medicines you take.  How can you care for yourself at home?  Make a plan for your safety   If you decide to stay with your abusive spouse or partner, you can do the following to increase your safety:  Decide what works best to keep you safe in an emergency.  Know who you can call to help you in an emergency.  Decide if you will call the police if you get " hurt again. If you can, agree on a signal with your children or neighbor to call the police for you if you need help. You can flash lights or hang something out of a window.  Choose a safe place to go for a short time if you need to leave home. Memorize the address and phone number.  Learn escape routes out of your home in case you need to leave in a hurry. Teach your children different ways to get out of your home quickly if they need to.  If you can, hide or lock up things that can be used as weapons (guns, knives, hammers).  Learn the number of a domestic violence shelter. Talk to the people there about how they can help.  Find out about other community resources that can help you.  Take pictures of bruises or other injuries if you can. You can also take pictures of things your abuser has broken.  Teach your children that violence is never okay. Tell them that they do not deserve to be hurt.  Pack a bag   Prepare a bag with things you will need if you leave suddenly. Leave it with a friend, a relative, or someone else you trust. You could include the following items in the bag:  A set of keys to your home and car.  Emergency phone numbers and addresses.  Money such as cash or checks. You can also ask a friend, a relative, or someone else you trust to hold money for you.  Copies of legal documents such as house and car titles or rent receipts, birth certificates, Social Security card, voter registration, marriage and 's licenses, and your children's health records.  Personal items you would need for a few days, such as clothes, a toothbrush, toothpaste, and any medicines you or your children need.  A favorite toy or book for your child or children.  Diapers and bottles, if you have very young children.  Pictures that show signs of abuse and violence. You may also add pictures of your abuser.  If you leave   If you decide to leave, you can take the following steps:  Go to the emergency room at a hospital if  "you have been hurt.  Think about asking the police to be with you as you leave. They can protect you as you leave your home.  If you decide to leave secretly, remember that activities can be tracked. Your abuser may still have access to your cell phone, email, and credit cards. It may be possible for these to be traced. Always be aware of your surroundings.  If this is an emergency, do not worry about gathering up anything. Just leave--your safety is most important.  If your abuser moves out, change the locks on the doors. If you have a security system, change the access code.  When should you call for help?   Call 911 anytime you think you may need emergency care. For example, call if:    You or someone else has just been abused.     You think you or someone else is in danger of being abused.   Watch closely for changes in your health, and be sure to contact your doctor if you have any problems.  Where can you learn more?  Go to https://www.[employer].net/patiented  Enter A752 in the search box to learn more about \"Intimate Partner Violence Safety Instructions: Care Instructions.\"  Current as of: July 31, 2024  Content Version: 14.5    3747-7074 [employer].   Care instructions adapted under license by your healthcare professional. If you have questions about a medical condition or this instruction, always ask your healthcare professional. [employer] disclaims any warranty or liability for your use of this information.    Learning About Intimate Partner Violence  What is intimate partner violence?  Intimate partner violence is a type of domestic abuse. It's threatening, emotionally harmful, or violent behavior in a personal relationship. It can happen between past or current partners or spouses. In some relationships both people abuse each other. One partner may be more abusive. Or the abuse may be equal.  Abuse can affect people of any ethnic group, race, or Methodist. It can affect teens, " adults, or the elderly. And it can happen to people of any sexual orientation, gender, or social status.  Abusers use fear, bullying, and threats to control their partners. They may control what their partners do. They may control where their partners go or who they see. They may act jealous, controlling, or possessive. These early signs of abuse may happen soon after the start of the relationship. Sometimes it can be hard to notice abuse at first. But after the relationship becomes more serious, the abuse may get worse.  If you are being abused in your relationship, it's important to get help. The abuse is not your fault. You don't have to face it alone.  Be careful  It may not be safe to take home domestic abuse information like this handout. Some people ask a trusted friend to keep it for them. It's also important to plan ahead and to memorize the phone number of places you can go for help. If you are concerned about your safety, do not use your computer, smartphone, or tablet to read about domestic abuse.   What are the types of intimate partner violence?  Abuse can happen in different ways. Each type can happen on its own or in combination with others.  Emotional abuse  Emotional abuse is a pattern of threats, insults, or controlling behavior. It includes verbal abuse. It goes beyond healthy disagreements in a relationship. It's a sign of an unhealthy relationship.  Do you feel threatened, intimidated, or controlled?  Does your partner:  Threaten your children, other family members, or pets?  Use jokes meant to embarrass or shame you?  Call you names?  Tell you that you are a bad parent?  Threaten to take away your children?  Threaten to have you or your family members deported?  Control your access to money or other basic needs?  Control what you do, who you see or talk to, or where you go?  Another form of emotional abuse is denying that it is happening. Or the abuser may act like the abuse is no big deal or  is your fault.  Sexual abuse  With sexual abuse, abusers may try to convince or force you to have sex. They may force you into sex acts you're not comfortable with. Or they may sexually assault you. Sexual abuse can happen even if you are in a committed relationship.  Physical abuse  Physical abuse means that a partner hits, kicks, or does something else to physically hurt you. Physical abuse that starts with a slap might lead to kicking, shoving, and choking over time. The abuser may also threaten to hurt or kill you.  Stalking  Stalking means that an abuser gives you attention that you do not want and that causes you fear. Examples of stalking include:  Following you.  Showing up at places where the abuser isn't invited, such as at your work or school.  Constantly calling or texting you.  What problems can  to?  Intimate partner violence can be very dangerous. It can cause serious, repeated injury. It can even lead to death.  All forms of abuse can cause long-term health problems from the stress of a violent relationship. Verbal abuse can lead to sexual and physical abuse.  Abuse causes:  Emotional pain.  Depression.  Anxiety.  Post-traumatic stress.  Sexual abuse can lead to sexually transmitted infections (such as HIV/AIDS) and unplanned pregnancy.  Pregnancy can be a very dangerous time for people in abusive relationships. Abuse can cause or increase the risk of problems during pregnancy. These include low weight gain, anemia, infections, and bleeding. Abuse may also increase your baby's risk of low birth weight, premature birth, and death.  It can be hard for some victims of abuse to ask for help or to leave their relationship. You may feel scared, stuck, or not sure what steps to take. But it's important not to ignore abuse. Talking to someone you trust could be the first step to ending the abuse and taking care of your own health and happiness again. There are resources available that can help keep  "you safe.  Where can you get help?  Talk to a trusted friend. Find a local advocacy group, or talk to your doctor about the abuse.  Contact the National Domestic Violence Hotline at 0-011-251-ENMY (1-861.347.4066) for more safety tips. They can guide you to groups in your area that can help. Or go to the National Coalition Against Domestic Violence website at www.[employer].org to learn more.  Domestic violence groups or a counselor in your area can help you make a safety plan for yourself and your children.  When to call for help  Call 911 anytime you think you may need emergency care. For example, call if:  You think that you or someone you know is in danger of being abused.  You have been hurt and can't have someone safely take you to emergency care.  You have just been abused.  A family member has just been abused.  Where can you learn more?  Go to https://www.[employer].net/patiented  Enter S665 in the search box to learn more about \"Learning About Intimate Partner Violence.\"  Current as of: July 31, 2024  Content Version: 14.5    3866-3517 [employer].   Care instructions adapted under license by your healthcare professional. If you have questions about a medical condition or this instruction, always ask your healthcare professional. [employer] disclaims any warranty or liability for your use of this information.    Vaginal Bleeding During Pregnancy: Care Instructions  Overview     It's common to have some vaginal spotting when you are pregnant. In some cases, the bleeding isn't serious. And there aren't any more problems with the pregnancy.  But sometimes bleeding is a sign of a more serious problem. This is more common if the bleeding is heavy or painful. Examples of more serious problems include miscarriage, an ectopic pregnancy, and a problem with the placenta.  You may have to see your doctor again to be sure everything is okay. You may also need more tests to find the cause of the " bleeding.  Home treatment may be all you need. But it depends on what is causing the bleeding. Be sure to tell your doctor if you have any new symptoms or if your symptoms get worse.  The doctor has checked you carefully, but problems can develop later. If you notice any problems or new symptoms, get medical treatment right away.  Follow-up care is a key part of your treatment and safety. Be sure to make and go to all appointments, and call your doctor if you are having problems. It's also a good idea to know your test results and keep a list of the medicines you take.  How can you care for yourself at home?  If your doctor prescribed medicines, take them exactly as directed. Call your doctor if you think you are having a problem with your medicine.  Do not have vaginal sex until your doctor says it's okay.  Do not put anything in your vagina until your doctor says it's okay.  Ask your doctor about other activities you can or can't do.  Get a lot of rest. Being pregnant can make you tired.  Do not use nonsteroidal anti-inflammatory drugs (NSAIDs), such as ibuprofen (Advil, Motrin), naproxen (Aleve), or aspirin, unless your doctor says it is okay.  When should you call for help?   Call 911 anytime you think you may need emergency care. For example, call if:    You passed out (lost consciousness).     You have severe vaginal bleeding. This means you are soaking through a pad each hour for 2 or more hours.     You have sudden, severe pain in your belly or pelvis.   Call your doctor now or seek immediate medical care if:    You have new or worse vaginal bleeding.     You are dizzy or lightheaded, or you feel like you may faint.     You have pain in your belly, pelvis, or lower back.     You think that you are in labor.     You have a sudden release of fluid from your vagina.     You've been having regular contractions for an hour. This means that you've had at least 8 contractions within 1 hour or at least 4  contractions within 20 minutes, even after you change your position and drink fluids.     You notice that your baby has stopped moving or is moving much less than normal.   Watch closely for changes in your health, and be sure to contact your doctor if you have any problems.  Current as of: April 30, 2024  Content Version: 14.5    4068-2622 [employer].   Care instructions adapted under license by your healthcare professional. If you have questions about a medical condition or this instruction, always ask your healthcare professional. [employer] disclaims any warranty or liability for your use of this information.

## 2025-07-15 NOTE — Clinical Note
New OB with you on 7/22/25 Please sign prescription for PNV Patient was on aspirin with last pregnancy due to HTN, should she take with this pregnancy? Patient has viability ultrasound on 07/16/25 due to bleeding early on in pregnancy and AMA

## 2025-07-15 NOTE — LETTER
7/15/2025         RE: Timothy Crowell  [address] [facility] [address]        Dear Colleague,    Thank you for referring your patient, Timothy Crowell, to the [facility]. Please see a copy of my visit note below.      Diabetes Self-Management Education & Support    Presents for: Pre-Existing T2 Diabetes and Pregnancy    Type of service:  Video Visit    If the video visit is dropped, the video visit invitation should be resent by: Text to cell phone: [phone]    Originating Location (pt. Location): Other camping in [place]  Distant Location (provider location): Offsite  Mode of Communication:  Video Conference via [employer]    Video Start Time: 2:30 pm  Video End Time (time video stopped): 3:30 pm    How would patient like to obtain AVS? Appstarter    Blood Glucose/Ketone Log:  No numbers to review today      Assessment  Hx GDM with daughter a few years ago - she is now 4.  On insulin in that pregnancy. Reports her daughter was not >9# when born. After pregnancy she was diagnosed with T2D. Has been on Metformin but reports not always taking it and forgetting.     Is a RN. Comfortable with insulin injections. Therefore, can continue with virtual visits vs inperson to show her how to do insulin.     Notices her BG spikes more easily with pregnancy. Has not resumed Metformin. Open to insulin. Explained that we would need to stop the Metformin by end of her first trimester even if she resumes it and she will transition to insulin.  Discussed just starting insulin and not resuming Metformin if her glucose is high which she is on board with.     She and her  were wondering about CGM vs BGM. He has some Celia sensors and she tried wearing one. She likes not poking her finger as much with the sensor. Explained that the CGM is recommended to be an adjunct to BGM in pregnancy. Explained that I would request she still write down her sensor numbers on the logbook to help  determine fasting and meal times for interpretation. Since CGM is usually not covered if there is no insulin, will hold off on ordering it at this point until she needs insulin. She will check her glucose using her BGM but she needs more supplies for this. Will update her BGM order to reflect 4x/d testing.     She remembers the meal plan from her pregnancy. Since she is still in the first trimester, discussed that if she does not want all 3 snacks right now that is okay.  Recommend she at least have 3 meals and a bedtime snack and aim for the recommended amount for carbs at each.      Opportunities for ongoing education and support in diabetes-self management were discussed.    PLAN:  Meal Plan Adjustments:  -- Meal Plan Recommendation: 30-45 carbs at breakfast, 45-60 carbs at lunch, 45-60 carbs at supper, 15-30 carbs at each of 3 snacks between meals.   -- Keep a food record for the next follow-up.  Exercise / activity plan: activity as able and walk for 5-15 minutes after meals  Monitoring:  -- Continue to check BG 4 times daily (fasting and one hour(s) after each meal).  -- She would like to do a CGM (elan 3+) if insulin is started. Waiting to send this Rx until she needs insulin.   Follow Up:  -- Follow-up in 1 week scheduled to review her BGs. If medication is indicated, recommend to begin insulin.   -- She is to send in her numbers if 3 or more high readings before next follow up.   -- OB ava on 7/29 for first visit. Once she sees OB, will request a new referral from OB.     Topics to cover at upcoming visits: Monitoring and Taking Medication    See Care Plan for co-developed, patient-stated behavior change goals.    Education Materials Provided:  Understanding GDM, GDM logbook, 1 week food log (all mailed to her)    Subjective/Objective  Presents for: Individual review  Accompanied by: Self, , 3 yo daughter (Edie) --> they were camping and all together for our visit but still in [place]  Diabetes  education in the past 24mo: No  Focus of Visit: Monitoring  Diabetes type: Type 2  Disease course: Worsening  How confident are you filling out medical forms by yourself:: Extremely  Other concerns: English as a second language  Cultural Influences/Ethnic Background:  Choose not to answer    Diabetes Symptoms & Complications  Diabetes Related Symptoms: Fatigue, Neuropathy, Polydipsia (increased thirst), Polyphagia (increased hunger)  Weight trend: Stable  Symptom course: Worsening  Disease course: Worsening     Patient Problem List and Family Medical History reviewed for relevant medical history, current medical status, and diabetes risk factors.    Vitals:  LMP 06/03/2025       Estimated Date of Delivery: Mar 10, 2026    Labs:  Lab Results   Component Value Date    A1C 7.6 06/24/2025    A1C 5.4 11/30/2020     Lab Results   Component Value Date     06/24/2025     Lab Results   Component Value Date    LDL  06/24/2025      Comment:      Cannot estimate LDL when triglyceride exceeds 400 mg/dL     06/24/2025     Direct Measure HDL   Date Value Ref Range Status   06/24/2025 33 (L) >=50 mg/dL Final     GFR Estimate   Date Value Ref Range Status   06/24/2025 >90 >60 mL/min/1.73m2 Final     Comment:     eGFR calculated using 2021 CKD-EPI equation.   06/02/2020 >90 >60 mL/min/[1.73_m2] Final     Comment:     Non  GFR Calc  Starting 12/18/2018, serum creatinine based estimated GFR (eGFR) will be   calculated using the Chronic Kidney Disease Epidemiology Collaboration   (CKD-EPI) equation.       GFR Estimate If Black   Date Value Ref Range Status   06/02/2020 >90 >60 mL/min/[1.73_m2] Final     Comment:      GFR Calc  Starting 12/18/2018, serum creatinine based estimated GFR (eGFR) will be   calculated using the Chronic Kidney Disease Epidemiology Collaboration   (CKD-EPI) equation.       Lab Results   Component Value Date    CR 0.64 06/24/2025    CR 0.47 06/02/2020     Lab Results    Component Value Date    MICROL 44.3 06/24/2025    UMALCR 24.34 06/24/2025    UCRR 182.0 06/24/2025       Last 3 BP:   BP Readings from Last 3 Encounters:   06/24/25 102/68   02/19/24 110/72   03/21/23 108/70       History   Smoking Status     Never   Smokeless Tobacco     Never         7/10/2025   Healthy Eating   Healthy Eating Assessed Today Yes   Cultural/[employer]ist diet restrictions? No   Meal planning/habits Smaller portions;Low carb   How many times a week on average do you eat food made away from home (restaurant/take-out)? 4   Meals include Breakfast;Lunch;Dinner   Breakfast more careful now since pregnant - avocado toast and water   Lunch white rice (has a half cup now instead of 2 c) with tofu or egg and vegetables (cabbage) or noodle (elma)   Dinner white rice (has a half cup now instead of 2 c) with tofu or egg and vegetables (cabbage) or noodle (elma)   Snacks not much - sometimes fruit like a banana or crackers   Beverages Water         7/10/2025   Being Active   Being Active Assessed Today No   Barrier to exercise Time         7/10/2025   Monitoring   Monitoring Assessed Today Yes   Blood Glucose Meter Accu-chek;CGM   Times checking blood sugar at home (number) 2   Times checking blood sugar at home (per) Week     Diabetes Medication(s)       Biguanides       metFORMIN (GLUCOPHAGE XR) 500 MG 24 hr tablet TAKE ONE TABLET BY MOUTH TWICE A DAY WITH MEALS     Patient not taking: Reported on 7/15/2025              7/10/2025   Taking Medications   Taking Medication Assessed Today Yes   Current Treatments Oral Medication (taken by mouth)         7/10/2025   Problem Solving   Problem Solving Assessed Today No           7/10/2025   Reducing Risks   Reducing Risks Assessed Today No   Has dilated eye exam at least once a year? Yes   Sees dentist every 6 months? Yes   Feet checked by healthcare provider in the last year? Yes         7/10/2025   Healthy Coping: Diabetes Distress Assessment   Healthy Coping Assessed  Today Yes   I feel burned out by all of the attention and effort that diabetes demands of me. 4 - A Serious Problem   It bothers me that diabetes seems to control my life. 4 - A Serious Problem   I am frustrated that even when I do what I am supposed to for my diabetes, it doesn't seem to make a difference. 4 - A Serious Problem   No matter how hard I try with my diabetes, it feels like it will never be good enough. 4 - A Serious Problem   I am so tired of having to worry about diabetes all the time. 4 - A Serious Problem   When it comes to my diabetes, I often feel like a failure. 4 - A Serious Problem   It depresses me when I realize that my diabetes will likely never go away. 4 - A Serious Problem   Living with diabetes is overwhelming for me. 4 - A Serious Problem   T2 DDAS Total Score (0 - 1.9 Little or no DD, 2.0 - 2.9 Moderate DD,  3.0+ High DD) 4    Informal Support system: Family;Spouse       Patient-reported       Any changes to above medications since becoming pregnant: has stopped Metformin    Care Plan and Education Provided:  Hormones in pregnancy cause rising insulin resistance & increased insulin needs throughout pregnancy requiring frequent follow-up and dose adjustments, up to 1-2 times/week., Risks and complications of elevated glucose and diabetes during pregnancy. Patient advised to discuss with OB if experiencing symptoms of complications., Healthy eating: appropriate carb intake, Monitoring: Frequency of monitoring and Glucose goals: Fasting glucose 70-94 mg/dL, and either 1-hour post-meal glucose less than 140 mg/dL, or 2-hour post-meal glucose less than 120 mg/dL, and Working with the care team (diabetes educator, Endocrinologist, OB/GYN to manage glucose during pregnancy)    Ambika Salinas, ANNE, EMEKA, [facility] [place][note 2]  Time Spent: 60 minutes  Encounter Type: Individual    Diabetes medication dose changes were made via the [facility] [place][note 2] Standing Orders under the patient's referring provider.

## 2025-07-16 ENCOUNTER — TRANSCRIBE ORDERS (OUTPATIENT)
Dept: MATERNAL FETAL MEDICINE | Facility: CLINIC | Age: 35
End: 2025-07-16

## 2025-07-16 ENCOUNTER — TELEPHONE (OUTPATIENT)
Dept: OBGYN | Facility: CLINIC | Age: 35
End: 2025-07-16

## 2025-07-16 ENCOUNTER — ANCILLARY PROCEDURE (OUTPATIENT)
Dept: ULTRASOUND IMAGING | Facility: CLINIC | Age: 35
End: 2025-07-16
Attending: OBSTETRICS & GYNECOLOGY
Payer: COMMERCIAL

## 2025-07-16 DIAGNOSIS — O26.90 PREGNANCY RELATED CONDITION, ANTEPARTUM: Primary | ICD-10-CM

## 2025-07-16 DIAGNOSIS — O20.9 BLEEDING IN EARLY PREGNANCY: ICD-10-CM

## 2025-07-16 PROCEDURE — 76817 TRANSVAGINAL US OBSTETRIC: CPT | Performed by: OBSTETRICS & GYNECOLOGY

## 2025-07-16 PROCEDURE — 76801 OB US < 14 WKS SINGLE FETUS: CPT | Performed by: OBSTETRICS & GYNECOLOGY

## 2025-07-16 RX ORDER — LANCETS
EACH MISCELLANEOUS
Qty: 150 EACH | Refills: 0 | Status: SHIPPED | OUTPATIENT
Start: 2025-07-16

## 2025-07-16 NOTE — TELEPHONE ENCOUNTER
Patient scheduled on 7/16 for early US due to bleeding in early pregnancy.    No follow up appt was scheduled.    LMP 3/10/25   6w1d    Early US completed in [place].  Report not completed/finalized.    Adding to RN schedule 7/17 to follow up on US.    Per review of images appears to be measuring appropriately but assuming we will repeat US.    Just need to confirm with provider once US is finalized.    Karin Nino RN

## 2025-07-17 VITALS — WEIGHT: 139 LBS | BODY MASS INDEX: 27.15 KG/M2

## 2025-07-17 RX ORDER — PNV NO.95/FERROUS FUM/FOLIC AC 28MG-0.8MG
1 TABLET ORAL DAILY
Qty: 90 TABLET | Refills: 3 | Status: SHIPPED | OUTPATIENT
Start: 2025-07-17

## 2025-07-17 NOTE — TELEPHONE ENCOUNTER
Report is finalized (below).  There is a yolk sac and gestational sac, but no fetal pole (CRL) seen.  With this result we recommend repeat ultrasound in 11 or more days.  If there is no embryo with heartbeat on that scan, then it would be diagnostic for early pregnancy loss.    Charissa Bro  Gest Sac: vis      MSD: 1.14cm  Position:nl    Contour:smooth/regular.   Yolk sac: 1.7 mm wnl        Rt Ov L: 1.38 x 1.02 x 1.47 cm   Wnl  Lt Ov L: 1.71 x 2.02 x 2.04 cm    Wnl  Cul de sac: no free fluid        Technique: Transvaginal Imaging performed  Transabdominal Imaging performed     ======================================     Early obstetric transabdominal and transvaginal ultrasound. Intrauterine pregnancy is seen.     Normal amniotic fluid seen.     Placenta: not seen, appropriate for this gestational age  Gestational sac: seen and within normal limits   Yolk sac: seen and within normal limits      No CRL is noted.     Early intrauterine pregnancy. Suggest follow up US in 7-10 days to confirm dating.

## 2025-07-21 RX ORDER — HYDROCHLOROTHIAZIDE 12.5 MG/1
CAPSULE ORAL
Qty: 2 EACH | Refills: 11 | Status: SHIPPED | OUTPATIENT
Start: 2025-07-21

## 2025-07-21 RX ORDER — INSULIN LISPRO 100 [IU]/ML
4 INJECTION, SOLUTION INTRAVENOUS; SUBCUTANEOUS
Qty: 15 ML | Refills: 4 | Status: SHIPPED | OUTPATIENT
Start: 2025-07-21

## 2025-07-21 NOTE — TELEPHONE ENCOUNTER
Diabetes in Pregnancy Follow-up    Subjective/Objective:    Timothy Crowell sent in blood glucose log for review. Last date of communication was: 7/15.    Type 2 Diabetes in Pregnancy is being managed with diet and activity    Taking diabetes medications: no    Estimated Date of Delivery: Mar 10, 2026    BG/Food Log:       She confirmed these are the recent numbers and she had the wrong dates written on them.     Assessment:    Fasting blood glucoses: 0% in target.  After breakfast: 33% in target.  Before lunch: -% in target.  After lunch: 0% in target.  Before dinner: -% in target.  After dinner: 0% in target.    Per US on 7/16, There is a yolk sac and gestational sac, but no fetal pole (CRL) seen. With this result we recommend repeat ultrasound in 11 or more days. If there is no embryo with heartbeat on that scan, then it would be diagnostic for early pregnancy loss.     Wt Readings from Last 2 Encounters:   07/15/25 63 kg (139 lb)     Almost all of her BGs are elevated.  Based on her results, she should start insulin. Per standing orders, can initiate up to 0.2-0.7 unit(s)/kg split as 50% long acting and 50% rapid acting at meals if A1c above goal in pregnancy and elevated fasting numbers and post meal numbers.  She was on insulin in her previous pregnancy with her daughter a few years ago. Reports she remembers how to give it.     63 x 0.4 = 25.2  12 unit(s) long acting  4 unit(s) rapid acting TID with meals    Plan/Response:  -- Recommend that patient begin 12 unit(s) long acting insulin (Semglee) and 4 unit(s) TID rapid acting insulin (Lispro) with meals (total of 0.4 unit(s)/kg).  Order placed for Lispro with meals.  Will route Semglee Rx to PCP for signing since this is off label in pregnancy (but recommended based on her BGs vs NPH bid).   -- Will order Celia 3+ since we are starting insulin and she requested this. Will need a PA.  -- Follow up scheduled 7/24.   -- OB ava on 7/29 for first visit.  Once she sees OB, will request a new referral from OB.     Ambika Salinas RDN, EMEKA, [facility]    Any diabetes medication dose changes were made via the [facility] Standing Orders under the patient's referring provider.

## 2025-07-22 ENCOUNTER — TELEPHONE (OUTPATIENT)
Dept: FAMILY MEDICINE | Facility: CLINIC | Age: 35
End: 2025-07-22
Payer: COMMERCIAL

## 2025-07-22 RX ORDER — INSULIN GLARGINE-YFGN 100 [IU]/ML
12 INJECTION, SOLUTION SUBCUTANEOUS AT BEDTIME
Qty: 15 ML | Refills: 4 | Status: SHIPPED | OUTPATIENT
Start: 2025-07-22

## 2025-07-24 ENCOUNTER — VIRTUAL VISIT (OUTPATIENT)
Dept: EDUCATION SERVICES | Facility: CLINIC | Age: 35
End: 2025-07-24
Payer: COMMERCIAL

## 2025-07-24 ENCOUNTER — MYC MEDICAL ADVICE (OUTPATIENT)
Dept: EDUCATION SERVICES | Facility: CLINIC | Age: 35
End: 2025-07-24

## 2025-07-24 DIAGNOSIS — E11.65 TYPE 2 DIABETES MELLITUS WITH HYPERGLYCEMIA, WITHOUT LONG-TERM CURRENT USE OF INSULIN (H): ICD-10-CM

## 2025-07-24 DIAGNOSIS — O24.111 PRE-EXISTING TYPE 2 DIABETES MELLITUS DURING PREGNANCY IN FIRST TRIMESTER: Primary | ICD-10-CM

## 2025-07-24 DIAGNOSIS — E11.65 TYPE 2 DIABETES MELLITUS WITH HYPERGLYCEMIA, WITHOUT LONG-TERM CURRENT USE OF INSULIN (H): Primary | ICD-10-CM

## 2025-07-24 NOTE — LETTER
7/24/2025         RE: Timothy Crowell  [address] [facility] [address]        Dear Colleague,    Thank you for referring your patient, Timothy Crowell, to the [facility] [place]. Please see a copy of my visit note below.      Diabetes Self-Management Education & Support    Presents for: Pre-Existing Diabetes and Pregnancy    Type of service:  Video Visit    If the video visit is dropped, the video visit invitation should be resent by: Text to cell phone: [phone]    Originating Location (pt. Location): Home  Distant Location (provider location): Offsite  Mode of Communication:  Video Conference via Respic[employer]ia    Video Start Time: 9:05 am  Video End Time (time video stopped): 9:35am    How would patient like to obtain AVS? Creww    Blood Glucose/Ketone Log:    Date Fasting blood sugar 1 hour post brkfst 1 hour post lunch 1 hour post dinner   7/23 Started mealtime insulin this day 99 Slept all day so no meals until dinner  130   7/24 141 (no long acting insulin yet)          Assessment    Fasting blood glucoses:  0% in target.  After breakfast: -% in target.  After lunch: -% in target.  After dinner: 100% in target.    Picked up both insulins now. Has not started basal insulin yet. Will start tonight. Educated on taking the basal insulin at the same time each day regardless of her work schedule. 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 Instructed her that she can still eat meals when working but to bring her insulin and take her mealtime insulin 10-15 min before eating. Reviewed that her fasting number each day is when she wakes up from her biggest sleep.     She has only taken the mealtime insulin with one meal so far but BG in target after.     Reports she is drinking a lot of water per day.     Opportunities for ongoing education and support in diabetes-self management were discussed.    PLAN:  Meal Plan Adjustments:  -- Meal Plan Recommendation: 30-45 carbs at  breakfast, 45-60 carbs at lunch, 45-60 carbs at supper, 15 carbs at each of 3 snacks between meals  -- Take mealtime insulin 10-15 minutes before starting a meal  Exercise / activity plan: activity as able  Monitoring:  -- Continue to check BG 4 times daily (fasting and one hour(s) after each meal).  -- Can begin use of Celia 3+.   Medication Adjustments:  -- No insulin changes today. She will start the long acting tonight.  Reducing Risks & Complications:  -- Recommend referral to Endocrinology. Referral placed.  Follow Up:  -- Follow-up in 3-4 days via Linkdex.   -- Next scheduled follow up 8/4 video.     See Care Plan for co-developed, patient-stated behavior change goals.    Education Materials Provided:  No new materials provided today    Subjective/Objective  Presents for: Follow-up  Accompanied by: Self  Diabetes education in the past 24mo: Yes  Focus of Visit: Monitoring, Taking Medication  Diabetes type: Type 2  Disease course: Worsening  How confident are you filling out medical forms by yourself:: Extremely  Diabetes management related comments/concerns: Picked up the insulin now. Just got the long acting last night so has not taken it yet.  Other concerns: English as a second language  Cultural Influences/Ethnic Background:  Choose not to answer    Diabetes Symptoms & Complications  Diabetes Related Symptoms: Fatigue, Neuropathy, Polydipsia (increased thirst), Polyphagia (increased hunger)  Weight trend: Stable  Symptom course: Worsening  Disease course: Worsening  Complications assessed today?: No  Patient Problem List and Family Medical History reviewed for relevant medical history, current medical status, and diabetes risk factors.    Vitals:  LMP 06/03/2025     Pre pregnancy weight: 139#    Weight gain 0 lbs at 7 weeks gestation.  Wt Readings from Last 3 Encounters:   07/15/25 63 kg (139 lb)   06/24/25 63 kg (139 lb)   02/19/24 65.8 kg (145 lb)       Estimated Date of Delivery: Mar 10,  2026    Labs:  Lab Results   Component Value Date    A1C 7.6 06/24/2025    A1C 5.4 11/30/2020     Lab Results   Component Value Date     06/24/2025     Lab Results   Component Value Date    LDL  06/24/2025      Comment:      Cannot estimate LDL when triglyceride exceeds 400 mg/dL     06/24/2025     Direct Measure HDL   Date Value Ref Range Status   06/24/2025 33 (L) >=50 mg/dL Final     GFR Estimate   Date Value Ref Range Status   06/24/2025 >90 >60 mL/min/1.73m2 Final     Comment:     eGFR calculated using 2021 CKD-EPI equation.   06/02/2020 >90 >60 mL/min/[1.73_m2] Final     Comment:     Non  GFR Calc  Starting 12/18/2018, serum creatinine based estimated GFR (eGFR) will be   calculated using the Chronic Kidney Disease Epidemiology Collaboration   (CKD-EPI) equation.       GFR Estimate If Black   Date Value Ref Range Status   06/02/2020 >90 >60 mL/min/[1.73_m2] Final     Comment:      GFR Calc  Starting 12/18/2018, serum creatinine based estimated GFR (eGFR) will be   calculated using the Chronic Kidney Disease Epidemiology Collaboration   (CKD-EPI) equation.       Lab Results   Component Value Date    CR 0.64 06/24/2025    CR 0.47 06/02/2020     Lab Results   Component Value Date    MICROL 44.3 06/24/2025    UMALCR 24.34 06/24/2025    UCRR 182.0 06/24/2025       Last 3 BP:   BP Readings from Last 3 Encounters:   06/24/25 102/68   02/19/24 110/72   03/21/23 108/70       History   Smoking Status     Never   Smokeless Tobacco     Never         7/24/2025   Healthy Eating   Healthy Eating Assessed Today Yes   Cultural/Religion diet restrictions? No   Meal planning/habits Smaller portions;Low carb   How many times a week on average do you eat food made away from home (restaurant/take-out)? 4   Meals include Breakfast;Lunch;Dinner   Breakfast more careful now since pregnant - avocado toast and water   Lunch white rice (has a half cup now instead of 2 c) with tofu or egg and  vegetables (cabbage) or noodle (elma)   Dinner white rice (has a half cup now instead of 2 c) with tofu or egg and vegetables (cabbage) or noodle (elma)   Snacks not much - sometimes fruit like a banana or crackers   Beverages Water         7/24/2025   Monitoring   Monitoring Assessed Today Yes   Blood Glucose Meter Accu-chek;CGM   Times checking blood sugar at home (number) 3   Times checking blood sugar at home (per) Day     Diabetes Medication(s)       Biguanides       metFORMIN (GLUCOPHAGE XR) 500 MG 24 hr tablet TAKE ONE TABLET BY MOUTH TWICE A DAY WITH MEALS     Patient not taking: Reported on 7/15/2025       Insulin       Insulin Glargine-yfgn (SEMGLEE, YFGN,) 100 UNIT/ML SOPN Inject 12 Units subcutaneously at bedtime. TDD 14 unit(s) with prime.     insulin lispro (HUMALOG KWIKPEN) 100 UNIT/ML (1 unit dial) KWIKPEN Inject 4 Units subcutaneously 3 times daily (before meals). TDD 18 unit(s) with priming.              7/24/2025   Taking Medications   Taking Medication Assessed Today Yes   Current Treatments Insulin Injections   Dose schedule Pre-breakfast;Pre-lunch;Pre-dinner;At bedtime   Given by Patient         7/24/2025   Problem Solving   Problem Solving Assessed Today Yes   Is the patient at risk for hypoglycemia? Yes   Hypoglycemia Frequency Never       Any changes to above medications since becoming pregnant: Insulin started, Metformin stopped.    Care Plan and Education Provided:  Hormones in pregnancy cause rising insulin resistance & increased insulin needs throughout pregnancy requiring frequent follow-up and dose adjustments, up to 1-2 times/week., Risks and complications of elevated glucose and diabetes during pregnancy. Patient advised to discuss with OB if experiencing symptoms of complications., Healthy eating: appropriate carb intake, Mealtime insulin: checking glucose 1 hour after the start of a meal and importance of taking mealtime insulin at least 10 minutes and up to 30 minutes before eating  based on glucose results, Monitoring: Glucose goals: Fasting glucose 70-95 mg/dL, and either 1-hour post-meal glucose less than 140 mg/dL, or 2-hour post-meal glucose less than 120 mg/dL, CGM target sensor glucose range of  mg/dL in pregnancy, CGM time in range goal for Type 2 Diabetes in pregnancy is >90% of the time, and Working with the care team (diabetes educator, Endocrinologist, OB/GYN to manage glucose during pregnancy)    Ambika Salinas, ANNE, LD, [facility]  Time Spent: 30 minutes  Encounter Type: Individual    Diabetes medication dose changes were made via the [facility] Standing Orders under the patient's referring provider.

## 2025-07-24 NOTE — PATIENT INSTRUCTIONS
"Send me a mychart on Monday with your blood sugars.     Freestyle Celia 3 or 3+    SENSOR BASICS:  Understand that your glucose sensor measures your glucose in your interstitial fluid and your blood sugar measures your glucose in your blood stream. The readings are not meant to be the same.        Your sensor glucose will lag behind your blood glucose.  \"Remember the roller coaster\".  Your blood sugar is always the front car and your sensor glucose is always the last car.  When blood sugar is moving up or down, the more difference there will be.          Take it easy while wearing the sensor.  Take extra care while bathing and getting dressed.  Wear loose fitting clothes on your sensor and try to avoid laying on your sensor.  You can shower/bathe with the sensor on.  But avoid submerging the sensor more than 3 feet for more than 30 minutes.  Gently pat to dry.    INITIAL SET UP:  Download the Celia by [employer] daniela if using your smartphone and create an account.  The daniela will walk you through set up.  If you are using the LearnSprout 3 , turn it on and follow instructions for set up.                    Celia by [employer]:   There will be a 60 minute warm up for each new sensor.  Each sensor should last 14 days.  Do not take more than 500mg of vitamin C (Celia 3+ more than 1000mg) per day or this can affect sensor accuracy.    The first 12 hours of every new sensor often a little \"off\" as it gets to know your body fluids.  Set glucose alarms for highs and lows per your preference or at the recommendations of your diabetes educator.  You will not be able to silence or turn off the urgent low alert at 54 mg/dL.  If an alarm goes off, go to your daniela and acknowledge it or you will continue to get alarmed every 5 minutes.  Your LearnSprout 3 daniela or  will notify you when it is time to change your sensor.  Just remove it like a band aid and throw it in the trash, then place a new sensor.  It is recommended to switch arms with " "every sensor.    DAILY ROUTINE:  Keep your phone or  within 33 feet of you to keep data communicating with your device.  If you are away from your device for more than 20 minutes, your device will alarm.  Be curious with what the sensor data shows.  How do your food choices impact your glucose?  Exercise?  Complete a fingerstick glucose check at these times:                          -when you feel differently than the sensor indicates                          -when you are not wearing a sensor                          -when you see this symbol:     DATA SHARING:  Our clinic will link to your data as well (phone users only). At the bottom click on profile/personal, go to connected apps, then touch \"connect\" next to [employer] and then \"connect to a practice\".  Enter our practice ID \"[id number]\" and hit connect.    OTHER IMPORTANT NOTES:  If the sensor is often falling off before the 14 days are up, there are products than can help.  Talk to your diabetes educator.  You can leave your Celia sensor ON for radiology scans (Xray, CT scan or MRI), however it is recommended that you use fingersticks for accurate readings during and 1 hour after an MRI as the accuracy of the sensor is questionable during this time.  If you need help with setting up your daniela, starting a new sensor or other training, you can call [phone] or sign up at: [email or web address]  They can help you 1:1 by phone or virtual visit.  Contact the  if the sensor does not last the full 14 days for any reason, or if you have any other technical problems. Keep your Celia sensor box with the lot and serial numbers as they often ask for this information.  -Celia customer service phone number: [phone]     -Website for celia replacement due to sensor failure or falling off: https://[email or web address].abbott/us-en/support/sensor-support-form-questions.html  If your copay is more than $75 a month, call the copay " "assistance line at [phone] and they will work with your pharmacy to get your cost down.  Or, you can give the pharmacy the following information:      [id number], Group: [id number], Static ID: [id number], EXP: 12/31/25     Turn off automatic OS updates and do not update your phone's OS until you check your diabetes device 's website to verify that the medical apps you use are compatible with the new OS version. Turn off automatic OS updates by navigating to your system settings, usually accessible through a gear icon, and find the \"software update\" option; within this section, look for a toggle switch labeled \"automatic updates\" or similar, and disable it.    After updating your OS or adding a new accessory such as wireless headphones, confirm alert settings and then carefully monitor your medical device daniela to make sure you can receive and hear alerts as expected.    At least once a month, check that your smartphone alerts are configured as expected. Ensure your volume, vibration, notifications, and other relevant settings still work.      "

## 2025-07-24 NOTE — PROGRESS NOTES
Diabetes Self-Management Education & Support    Presents for: Pre-Existing Diabetes and Pregnancy    Type of service:  Video Visit    If the video visit is dropped, the video visit invitation should be resent by: Text to cell phone: [phone]    Originating Location (pt. Location): Home  Distant Location (provider location): Offsite  Mode of Communication:  Video Conference via [employer] Start Time: 9:05 am  Video End Time (time video stopped): 9:35am    How would patient like to obtain AVS? Revenewhart    Blood Glucose/Ketone Log:    Date Fasting blood sugar 1 hour post brkfst 1 hour post lunch 1 hour post dinner   7/23 Started mealtime insulin this day 99 Slept all day so no meals until dinner  130   7/24 141 (no long acting insulin yet)          Assessment    Fasting blood glucoses:  0% in target.  After breakfast: -% in target.  After lunch: -% in target.  After dinner: 100% in target.    Picked up both insulins now. Has not started basal insulin yet. Will start tonight. Educated on taking the basal insulin at the same time each day regardless of her work schedule. 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 Instructed her that she can still eat meals when working but to bring her insulin and take her mealtime insulin 10-15 min before eating. Reviewed that her fasting number each day is when she wakes up from her biggest sleep.     She has only taken the mealtime insulin with one meal so far but BG in target after.     Reports she is drinking a lot of water per day.     Opportunities for ongoing education and support in diabetes-self management were discussed.    PLAN:  Meal Plan Adjustments:  -- Meal Plan Recommendation: 30-45 carbs at breakfast, 45-60 carbs at lunch, 45-60 carbs at supper, 15 carbs at each of 3 snacks between meals  -- Take mealtime insulin 10-15 minutes before starting a meal  Exercise / activity plan: activity as able  Monitoring:  -- Continue to check BG 4 times daily (fasting  and one hour(s) after each meal).  -- Can begin use of Celia 3+.   Medication Adjustments:  -- No insulin changes today. She will start the long acting tonight.  Reducing Risks & Complications:  -- Recommend referral to Endocrinology. Referral placed.  Follow Up:  -- Follow-up in 3-4 days via [employer].   -- Next scheduled follow up 8/4 video.     See Care Plan for co-developed, patient-stated behavior change goals.    Education Materials Provided:  No new materials provided today    Subjective/Objective  Presents for: Follow-up  Accompanied by: Self  Diabetes education in the past 24mo: Yes  Focus of Visit: Monitoring, Taking Medication  Diabetes type: Type 2  Disease course: Worsening  How confident are you filling out medical forms by yourself:: Extremely  Diabetes management related comments/concerns: Picked up the insulin now. Just got the long acting last night so has not taken it yet.  Other concerns: English as a second language  Cultural Influences/Ethnic Background:  Choose not to answer    Diabetes Symptoms & Complications  Diabetes Related Symptoms: Fatigue, Neuropathy, Polydipsia (increased thirst), Polyphagia (increased hunger)  Weight trend: Stable  Symptom course: Worsening  Disease course: Worsening  Complications assessed today?: No  Patient Problem List and Family Medical History reviewed for relevant medical history, current medical status, and diabetes risk factors.    Vitals:  LMP 06/03/2025     Pre pregnancy weight: 139#    Weight gain 0 lbs at 7 weeks gestation.  Wt Readings from Last 3 Encounters:   07/15/25 63 kg (139 lb)   06/24/25 63 kg (139 lb)   02/19/24 65.8 kg (145 lb)       Estimated Date of Delivery: Mar 10, 2026    Labs:  Lab Results   Component Value Date    A1C 7.6 06/24/2025    A1C 5.4 11/30/2020     Lab Results   Component Value Date     06/24/2025     Lab Results   Component Value Date    LDL  06/24/2025      Comment:      Cannot estimate LDL when triglyceride exceeds 400  mg/dL     06/24/2025     Direct Measure HDL   Date Value Ref Range Status   06/24/2025 33 (L) >=50 mg/dL Final     GFR Estimate   Date Value Ref Range Status   06/24/2025 >90 >60 mL/min/1.73m2 Final     Comment:     eGFR calculated using 2021 CKD-EPI equation.   06/02/2020 >90 >60 mL/min/[1.73_m2] Final     Comment:     Non  GFR Calc  Starting 12/18/2018, serum creatinine based estimated GFR (eGFR) will be   calculated using the Chronic Kidney Disease Epidemiology Collaboration   (CKD-EPI) equation.       GFR Estimate If Black   Date Value Ref Range Status   06/02/2020 >90 >60 mL/min/[1.73_m2] Final     Comment:      GFR Calc  Starting 12/18/2018, serum creatinine based estimated GFR (eGFR) will be   calculated using the Chronic Kidney Disease Epidemiology Collaboration   (CKD-EPI) equation.       Lab Results   Component Value Date    CR 0.64 06/24/2025    CR 0.47 06/02/2020     Lab Results   Component Value Date    MICROL 44.3 06/24/2025    UMALCR 24.34 06/24/2025    UCRR 182.0 06/24/2025       Last 3 BP:   BP Readings from Last 3 Encounters:   06/24/25 102/68   02/19/24 110/72   03/21/23 108/70       History   Smoking Status    Never   Smokeless Tobacco    Never         7/24/2025   Healthy Eating   Healthy Eating Assessed Today Yes   Cultural/Confucianist diet restrictions? No   Meal planning/habits Smaller portions;Low carb   How many times a week on average do you eat food made away from home (restaurant/take-out)? 4   Meals include Breakfast;Lunch;Dinner   Breakfast more careful now since pregnant - avocado toast and water   Lunch white rice (has a half cup now instead of 2 c) with tofu or egg and vegetables (cabbage) or noodle (elma)   Dinner white rice (has a half cup now instead of 2 c) with tofu or egg and vegetables (cabbage) or noodle (elma)   Snacks not much - sometimes fruit like a banana or crackers   Beverages Water         7/24/2025   Monitoring   Monitoring Assessed  Today Yes   Blood Glucose Meter Accu-chek;CGM   Times checking blood sugar at home (number) 3   Times checking blood sugar at home (per) Day     Diabetes Medication(s)       Biguanides       metFORMIN (GLUCOPHAGE XR) 500 MG 24 hr tablet TAKE ONE TABLET BY MOUTH TWICE A DAY WITH MEALS     Patient not taking: Reported on 7/15/2025       Insulin       Insulin Glargine-yfgn (SEMGLEE, YFGN,) 100 UNIT/ML SOPN Inject 12 Units subcutaneously at bedtime. TDD 14 unit(s) with prime.     insulin lispro (HUMALOG KWIKPEN) 100 UNIT/ML (1 unit dial) KWIKPEN Inject 4 Units subcutaneously 3 times daily (before meals). TDD 18 unit(s) with priming.              7/24/2025   Taking Medications   Taking Medication Assessed Today Yes   Current Treatments Insulin Injections   Dose schedule Pre-breakfast;Pre-lunch;Pre-dinner;At bedtime   Given by Patient         7/24/2025   Problem Solving   Problem Solving Assessed Today Yes   Is the patient at risk for hypoglycemia? Yes   Hypoglycemia Frequency Never       Any changes to above medications since becoming pregnant: Insulin started, Metformin stopped.    Care Plan and Education Provided:  Hormones in pregnancy cause rising insulin resistance & increased insulin needs throughout pregnancy requiring frequent follow-up and dose adjustments, up to 1-2 times/week., Risks and complications of elevated glucose and diabetes during pregnancy. Patient advised to discuss with OB if experiencing symptoms of complications., Healthy eating: appropriate carb intake, Mealtime insulin: checking glucose 1 hour after the start of a meal and importance of taking mealtime insulin at least 10 minutes and up to 30 minutes before eating based on glucose results, Monitoring: Glucose goals: Fasting glucose 70-95 mg/dL, and either 1-hour post-meal glucose less than 140 mg/dL, or 2-hour post-meal glucose less than 120 mg/dL, CGM target sensor glucose range of  mg/dL in pregnancy, CGM time in range goal for Type 2  Diabetes in pregnancy is >90% of the time, and Working with the care team (diabetes educator, Endocrinologist, OB/GYN to manage glucose during pregnancy)    Ambika Salinas, ANNE, EMEKA, [facility]  Time Spent: 30 minutes  Encounter Type: Individual    Diabetes medication dose changes were made via the [facility] Standing Orders under the patient's referring provider.

## 2025-07-25 NOTE — PATIENT INSTRUCTIONS
Learning About Pregnancy  Your Care Instructions     Your health in the early weeks of your pregnancy is particularly important for your baby's health. Take good care of yourself. Anything you do that harms your body can also harm your baby.  Make sure to go to all of your doctor appointments. Regular checkups will help keep you and your baby healthy.  How can you care for yourself at home?  Diet    Choose healthy foods like fruits, vegetables, whole grains, lean proteins, and healthy fats.     Choose foods that are good sources of calcium, iron, and folate. You can try dairy products, dark leafy greens, fortified orange juice and cereals, almonds, broccoli, dried fruit, and beans.     Do not skip meals or go for many hours without eating. If you are nauseated, try to eat a small, healthy snack every 2 to 3 hours.     Avoid fish that are high in mercury. These include shark, swordfish, yola mackerel, marlin, orange roughy, and bigeye tuna, as well as tilefish from the [facility].     It's okay to eat up to 8 to 12 ounces a week of fish that are low in mercury or up to 4 ounces a week of fish that have medium levels of mercury. Some fish that are low in mercury are salmon, shrimp, canned light tuna, cod, and tilapia. Some fish that have medium levels of mercury are halibut and white albacore tuna.     Drink plenty of fluids. If you have kidney, heart, or liver disease and have to limit fluids, talk with your doctor before you increase the amount of fluids you drink.     Limit caffeine to about 200 to 300 mg per day. On average, a cup of brewed coffee has around 80 to 100 mg of caffeine.     Do not drink alcohol, such as beer, wine, or hard liquor.     Take a multivitamin that contains at least 400 micrograms (mcg) of folic acid to help prevent birth defects. Fortified cereal and whole wheat bread are good additional sources of folic acid.     Increase the calcium in your diet. Try to drink a quart of skim milk  each day. You may also take calcium supplements and choose foods such as cheese and yogurt.   Lifestyle    Make sure you go to your follow-up appointments.     Get plenty of rest. You may be unusually tired while you are pregnant.     Get at least 30 minutes of exercise on most days of the week. Walking is a good choice. If you have not exercised in the past, start out slowly. Take several short walks each day.     Do not smoke. If you need help quitting, talk to your doctor about stop-smoking programs. These can increase your chances of quitting for good.     Do not touch cat feces or litter boxes. Also, wash your hands after you handle raw meat, and fully cook all meat before you eat it. Wear gloves when you work in the yard or garden, and wash your hands well when you are done. Cat feces, raw or undercooked meat, and contaminated dirt can cause an infection that may harm your baby or lead to a miscarriage.     Avoid things that can make your body too hot and may be harmful to your baby, such as a hot tub or sauna. Or talk with your doctor before doing anything that raises your body temperature. Your doctor can tell you if it's safe.     Avoid chemical fumes, paint fumes, or poisons.     Do not use illegal drugs, marijuana, or alcohol.   Medicines    Review all of your medicines with your doctor. Some of your routine medicines may need to be changed to protect your baby.     Use acetaminophen (Tylenol) to relieve minor problems, such as a mild headache or backache or a mild fever with cold symptoms. Do not use nonsteroidal anti-inflammatory drugs (NSAIDs), such as ibuprofen (Advil, Motrin) or naproxen (Aleve), unless your doctor says it is okay.     Do not take two or more pain medicines at the same time unless the doctor told you to. Many pain medicines have acetaminophen, which is Tylenol. Too much acetaminophen (Tylenol) can be harmful.     Take your medicines exactly as prescribed. Call your doctor if you  "think you are having a problem with your medicine.   To manage morning sickness    Keep food in your stomach, but not too much at once. Try eating five or six small meals a day instead of three large meals.     For nausea when you wake up, eat a small snack, such as a couple of crackers or pretzels, before rising. Allow a few minutes for your stomach to settle before you slowly get up.     Try to avoid smells and foods that make you feel nauseated. High-fat or greasy foods, milk, and coffee may make nausea worse. Some foods that may be easier to tolerate include cold, spicy, sour, and salty foods.     Drink enough fluids. Water and other caffeine-free drinks are good choices.     Take your prenatal vitamins at night on a full stomach.     Try foods and drinks made with gwendolyn. Gwendolyn may help with nausea.     Get lots of rest. Morning sickness may be worse when you are tired.     Talk to your doctor about over-the-counter products, such as vitamin B6 or doxylamine, to help relieve symptoms.     Try a P6 acupressure wrist band. These anti-nausea wristbands help some people.   Follow-up care is a key part of your treatment and safety. Be sure to make and go to all appointments, and call your doctor if you are having problems. It's also a good idea to know your test results and keep a list of the medicines you take.  Where can you learn more?  Go to https://www.Bitrockr.net/patiented  Enter E868 in the search box to learn more about \"Learning About Pregnancy.\"  Current as of: April 30, 2024  Content Version: 14.5    4251-4189 [employer].   Care instructions adapted under license by your healthcare professional. If you have questions about a medical condition or this instruction, always ask your healthcare professional. [employer] disclaims any warranty or liability for your use of this information.    Weeks 6 to 10 of Your Pregnancy: Care Instructions  During these weeks of pregnancy, your " "body goes through many changes. You may start to feel different, both in your body and your emotions. Each pregnancy is different, so there's no \"right\" way to feel. These early weeks are a time to make healthy choices for you and your pregnancy.    Take a daily prenatal vitamin. Choose one with folic acid in it.   Avoid alcohol, tobacco, and drugs (including marijuana). If you need help quitting, talk to your doctor.     Drink plenty of liquids.  Be sure to drink enough water. And limit sodas, other sweetened drinks, and caffeine.     Choose foods that are good sources of calcium, iron, and folate.  You can try dairy products, dark leafy greens, fortified orange juice and cereals, almonds, broccoli, dried fruit, and beans.     Avoid foods that may be harmful.  Don't eat raw meat, deli meat, raw seafood, or raw eggs. Avoid soft cheese and unpasteurized dairy, like Brie and blue cheese. And don't eat fish that contains a lot of mercury, like shark and swordfish.     Don't touch serenity litter or cat poop.  They can cause an infection that could be harmful during pregnancy.     Avoid things that can make your body too hot.  For example, avoid hot tubs and saunas.     Soothe morning sickness.  Try eating 5 or 6 small meals a day, getting some fresh air, or using shu to control symptoms.     Ask your doctor about flu and COVID-19 shots.  Getting them can help protect against infection.   Follow-up care is a key part of your treatment and safety. Be sure to make and go to all appointments, and call your doctor if you are having problems. It's also a good idea to know your test results and keep a list of the medicines you take.  Where can you learn more?  Go to [email or web address] the [email or web address]/patiented  Enter G112 in the search box to learn more about \"Weeks 6 to 10 of Your Pregnancy: Care Instructions.\"  Current as of: April 30, 2024  Content Version: 14.5    [id number] [facility] [employer].   Care instructions adapted " under license by your healthcare professional. If you have questions about a medical condition or this instruction, always ask your healthcare professional. [employer] disclaims any warranty or liability for your use of this information.       Pregnancy: Managing Morning Sickness (01:48)  Your health professional recommends that you watch this short online health video.  Learn how to manage morning sickness during pregnancy.   Purpose: Learn how to manage morning sickness during pregnancy.  Goal: Learn how to manage morning sickness during pregnancy.    Watch: Scan the QR code or visit the link to view video       https://hwi.se/[email or web address]  Current as of: April 30, 2024  Content Version: 14.5    0366-4050 [employer].   Care instructions adapted under license by your healthcare professional. If you have questions about a medical condition or this instruction, always ask your healthcare professional. [employer] disclaims any warranty or liability for your use of this information.    Pregnancy and Heartburn: Care Instructions  Overview     Heartburn is a common problem during pregnancy.  Heartburn happens when stomach acid backs up into the tube that carries food to the stomach. This tube is called the esophagus. Early in pregnancy, heartburn is caused by hormone changes that slow down digestion. Later on, it's also caused by the large uterus pushing up on the stomach.  Even though you can't fix the cause, there are things you can do to get relief. Treating heartburn during pregnancy focuses first on making lifestyle changes, like changing what and how you eat, and on taking medicines.  Heartburn usually improves or goes away after childbirth.  Follow-up care is a key part of your treatment and safety. Be sure to make and go to all appointments, and call your doctor if you are having problems. It's also a good idea to know your test results and keep a list of the medicines you  "take.  How can you care for yourself at home?  Eat small, frequent meals.  Avoid foods that make your symptoms worse, such as chocolate, peppermint, and spicy foods. Avoid drinks with caffeine, such as coffee, tea, and sodas.  Avoid bending over or lying down after meals.  Take a short walk after you eat.  If heartburn is a problem at night, do not eat for 2 hours before bedtime.  Take antacids like Mylanta, Maalox, Rolaids, or Tums. Do not take antacids that have sodium bicarbonate, magnesium trisilicate, or aspirin. Be careful when you take over-the-counter antacid medicines. Many of these medicines have aspirin in them. While you are pregnant, do not take aspirin or medicines that contain aspirin unless your doctor says it is okay.  If you're not getting relief, talk to your doctor. You may be able to take a stronger acid-reducing medicine.  When should you call for help?   Call your doctor now or seek immediate medical care if:    You have new or worse belly pain.     You are vomiting.   Watch closely for changes in your health, and be sure to contact your doctor if:    You have new or worse symptoms of reflux.     You are losing weight.     You have trouble or pain swallowing.     You do not get better as expected.   Where can you learn more?  Go to https://www.Kiyon.net/patiented  Enter U946 in the search box to learn more about \"Pregnancy and Heartburn: Care Instructions.\"  Current as of: April 30, 2024  Content Version: 14.5 2024-2025 [employer].   Care instructions adapted under license by your healthcare professional. If you have questions about a medical condition or this instruction, always ask your healthcare professional. [employer] disclaims any warranty or liability for your use of this information.    Constipation: Care Instructions  Overview     Constipation means that you have a hard time passing stools (bowel movements). People pass stools from 3 times a day to " once every 3 days. What is normal for you may be different. Constipation may occur with pain in the rectum and cramping. The pain may get worse when you try to pass stools. Sometimes there are small amounts of bright red blood on toilet paper or the surface of stools. This is because of enlarged veins near the rectum (hemorrhoids).  A few changes in your diet and lifestyle may help you avoid ongoing constipation. Your doctor may also prescribe medicine to help loosen your stool.  Some medicines can cause constipation. These include pain medicines and antidepressants. Tell your doctor about all the medicines you take. Your doctor may want to make a medicine change to ease your symptoms.  Follow-up care is a key part of your treatment and safety. Be sure to make and go to all appointments, and call your doctor if you are having problems. It's also a good idea to know your test results and keep a list of the medicines you take.  How can you care for yourself at home?  Drink plenty of fluids. If you have kidney, heart, or liver disease and have to limit fluids, talk with your doctor before you increase the amount of fluids you drink.  Include high-fiber foods in your diet each day. These include fruits, vegetables, beans, and whole grains.  Get at least 30 minutes of exercise on most days of the week. Walking is a good choice. You also may want to do other activities, such as running, swimming, cycling, or playing tennis or team sports.  Take a fiber supplement, such as Citrucel or Metamucil, every day. Read and follow all instructions on the label.  Schedule time each day for a bowel movement. A daily routine may help. Take your time having a bowel movement, but don't sit for more than 10 minutes at a time. And don't strain too much.  Support your feet with a small step stool when you sit on the toilet. This helps flex your hips and places your pelvis in a squatting position.  Your doctor may recommend an  "over-the-counter laxative to relieve your constipation. Examples are Milk of Magnesia and MiraLax. Read and follow all instructions on the label. Do not use laxatives on a long-term basis.  When should you call for help?   Call your doctor now or seek immediate medical care if:    You have new or worse belly pain.     You have new or worse nausea or vomiting.     You have blood in your stools.   Watch closely for changes in your health, and be sure to contact your doctor if:    Your constipation is getting worse.     You do not get better as expected.   Where can you learn more?  Go to https://www.siXis.net/patiented  Enter P343 in the search box to learn more about \"Constipation: Care Instructions.\"  Current as of: October 19, 2024  Content Version: 14.5 2024-2025 [employer].   Care instructions adapted under license by your healthcare professional. If you have questions about a medical condition or this instruction, always ask your healthcare professional. [employer] disclaims any warranty or liability for your use of this information.    Learning About High-Iron Foods  What foods are high in iron?     The foods you eat contain nutrients, such as vitamins and minerals. Iron is a nutrient. Your body needs the right amount to stay healthy and work as it should. You can use the list below to help you make choices about which foods to eat.  Here are some foods that contain iron. They have 1 to 2 milligrams of iron per serving.  Fruits  Figs (dried), 5 figs  Vegetables  Asparagus (canned), 6 [place]  [place], beet, Swiss chard, or turnip greens, 1 cup  Dried peas, cooked,   cup  Seaweed, spirulina (dried),   cup  Spinach, (cooked)   cup or (raw) 1 cup  Grains  Cereals, fortified with iron, 1 cup  Grits (instant, cooked), fortified with iron,   cup  Meats and other protein foods  Beans (kidney, lima, navy, white), canned or cooked,   cup  Beef or lamb, 3 oz  Chicken giblets, 3 " "oz  Chickpeas (garbanzo beans),   cup  Liver of beef, lamb, or pork, 3 oz  Oysters (cooked), 3 oz  Sardines (canned), 3 oz  Soybeans (boiled),   cup  Tofu (firm),   cup  Work with your doctor to find out how much of this nutrient you need. Depending on your health, you may need more or less of it in your diet.  Where can you learn more?  Go to https://www.Victoria Plumb.net/patiented  Enter R005 in the search box to learn more about \"Learning About High-Iron Foods.\"  Current as of: October 7, 2024  Content Version: 14.5 2024-2025 [employer].   Care instructions adapted under license by your healthcare professional. If you have questions about a medical condition or this instruction, always ask your healthcare professional. [employer] disclaims any warranty or liability for your use of this information.    Rh Antibodies Screening During Pregnancy: About This Test  What is it?     The Rh antibodies screening test is a blood test. It checks your blood for Rh antibodies. If you have Rh-negative blood and have been exposed to Rh-positive blood, your immune system may make antibodies to attack the Rh-positive blood. When a pregnant woman has these antibodies, it is called Rh sensitization.  Why is this test done?  The Rh antibodies screening test is done during pregnancy to find out if your baby is at risk for Rh disease. This can happen if you have Rh-negative blood and your baby has Rh-positive blood. If your Rh-negative blood mixes with Rh-positive blood, your immune system will make antibodies to attack the Rh-positive blood.  During pregnancy, these antibodies could attach to the baby's red blood cells. This can cause your baby to have serious health problems. The results of this test will help your doctor know how to best care for you and your baby during your pregnancy.  How do you prepare for the test?  In general, there's nothing you have to do before this test, unless your doctor tells you " "to.  How is the test done?  A health professional uses a needle to take a blood sample, usually from the arm.  What happens after the test?  You will probably be able to go home right away. It depends on the reason for the test.  You can go back to your usual activities right away.  Follow-up care is a key part of your treatment and safety. Be sure to make and go to all appointments, and call your doctor if you are having problems. It's also a good idea to keep a list of the medicines you take. Ask your doctor when you can expect to have your test results.  Where can you learn more?  Go to https://www.[email or web address].net/patiented  Enter P722 in the search box to learn more about \"Rh Antibodies Screening During Pregnancy: About This Test.\"  Current as of: 2024  Content Version: 14.5    2353-6558 [employer].   Care instructions adapted under license by your healthcare professional. If you have questions about a medical condition or this instruction, always ask your healthcare professional. [employer] disclaims any warranty or liability for your use of this information.    Learning About Preventing Rh Disease  What is Rh disease?    Rh disease can be a serious problem in pregnancy. It happens when substances called antibodies in the mother's blood cause red blood cells in her baby's blood to be destroyed. This can occur when the blood types of a mother and her baby do not match.  All blood has an Rh factor. This is what makes a blood type positive or negative. When you are Rh-negative, your baby may be Rh-negative or Rh-positive. If your baby has Rh-positive blood and it mixes with yours, your body will make antibodies. This is called Rh sensitization.  Most of the time, this is not a problem in a first pregnancy. But in future pregnancies, it could cause Rh disease.  A  with Rh disease has mild anemia and may have jaundice. In severe cases, anemia, jaundice, and swelling can be " "very dangerous or fatal. Some babies need to be delivered early. Some need special care in the NICU. A very sick baby will need a blood transfusion before or after birth.  Fortunately, Rh sensitization is usually easy to prevent.  That's why it's important to get your Rh status checked in your first trimester. It doesn't cause any warning signs. A blood test is the only way to know if you are Rh-sensitive or are at risk for it.  How can you prevent Rh disease?  If you are Rh-negative, an Rh immune globulin shot (such as RhoGAM) can help prevent your body from making the antibodies that attack your baby's red blood cells.  Timing is important. The shot is given at certain times during your pregnancy. And it is given anytime there is a chance that your baby's blood might mix with yours. That can happen with certain prenatal tests or when you have pregnancy bleeding, such as:  Right after any pregnancy loss, amniocentesis, or CVS testing.  After turning of a breech baby.  Before and maybe after childbirth. If you need the shot, it is given around week 28. If your  is Rh-positive, you will have another shot after birth.  Follow-up care is a key part of your treatment and safety. Be sure to make and go to all appointments, and call your doctor if you are having problems. It's also a good idea to know your test results and keep a list of the medicines you take.  Where can you learn more?  Go to https://www.Krossover.net/patiented  Enter W177 in the search box to learn more about \"Learning About Preventing Rh Disease.\"  Current as of: 2024  Content Version: 14.5    4582-6466 [employer]   Care instructions adapted under license by your healthcare professional. If you have questions about a medical condition or this instruction, always ask your healthcare professional. [employer] disclaims any warranty or liability for your use of this information.    Learning About Rh " Immunoglobulin Shots  Introduction    An Rh immunoglobulin shot is given during pregnancy to prevent Rh sensitization. Rh sensitization can happen if you have Rh-negative blood and your baby has Rh-positive blood. If the two types of blood mix, your body will make antibodies against your baby's blood. Most of the time, this is not a problem the first time you're pregnant. But it could cause problems in future pregnancies.  This shot keeps your body from making the antibodies. If you need the shot, it is given around 28 weeks of pregnancy. After the birth, your baby's blood is tested. If the blood is Rh positive, you will receive another shot. The shot may also be given if you have vaginal bleeding while you are pregnant or if you have a miscarriage. These shots protect future pregnancies.  Example  Rh immunoglobulin (HypRho-D, MICRhoGAM, and RhoGAM)  Possible side effects  Rare side effects may include:  Some mild pain where you got the shot.  A slight fever.  An allergic reaction.  You may have other side effects not listed here. Check the information that comes with your medicine.  What to know about taking this medicine  You may need more than one shot. You may need the shot again:  After amniocentesis, fetal blood sampling, or chorionic villus sampling tests.  If you have bleeding in your second or third trimester.  After turning of a breech baby.  After an injury to the belly while you are pregnant.  After a miscarriage or an .  Before or right after treatment for an ectopic or a partial molar pregnancy.  Tell your doctor if you have any allergies or have had a bad response to medicines in the past.  If you get this shot within 3 months of getting a live-virus vaccine, the vaccine may not work. Your doctor will tell you if you need more vaccine.  Check with your doctor or pharmacist before you use any other medicines. This includes over-the-counter medicines. Make sure your doctor knows all of the  "medicines, vitamins, herbs, and supplements you take. Taking some medicines at the same time can cause problems.  Where can you learn more?  Go to https://www.inMEDIA Corporation.net/patiented  Enter V615 in the search box to learn more about \"Learning About Rh Immunoglobulin Shots.\"  Current as of: April 30, 2024  Content Version: 14.5 2024-2025 [employer].   Care instructions adapted under license by your healthcare professional. If you have questions about a medical condition or this instruction, always ask your healthcare professional. [employer] disclaims any warranty or liability for your use of this information.    Rubella (Russian Measles): Care Instructions  Overview  Rubella, also called Russian measles or 3-day measles, is a disease caused by a virus. It spreads by coughs, sneezes, and close contact. Rubella usually is mild and does not cause long-term problems. But if you are pregnant and get it, you can give the disease to your unborn baby. This can cause serious birth defects.  While you have rubella, you may get a rash and a mild fever, and the lymph glands in your neck may swell. Older children often have a fever, eye pain, a sore throat, and body aches. You can relieve most symptoms with care at home. Avoid being around others, especially pregnant people, until your rash has been gone for at least 4 days. People who have not had this disease before or have not had the vaccine have the greatest chance of getting the virus.  Follow-up care is a key part of your treatment and safety. Be sure to make and go to all appointments, and call your doctor if you are having problems. It's also a good idea to know your test results and keep a list of the medicines you take.  How can you care for yourself at home?  Drink plenty of fluids. If you have kidney, heart, or liver disease and have to limit fluids, talk with your doctor before you increase the amount of fluids you drink.  Get plenty of " "rest to help your body heal.  Take an over-the-counter pain medicine, such as acetaminophen (Tylenol), ibuprofen (Advil, Motrin), or naproxen (Aleve), to reduce fever and discomfort. Read and follow all instructions on the label. Do not give aspirin to anyone younger than 20. It has been linked to Reye syndrome, a serious illness.  Do not take two or more pain medicines at the same time unless the doctor told you to. Many pain medicines have acetaminophen, which is Tylenol. Too much acetaminophen (Tylenol) can be harmful.  Try not to scratch the rash. Put cold, wet cloths on the rash to reduce itching.  Do not smoke. Smoking can make your symptoms worse. If you need help quitting, talk to your doctor about stop-smoking programs and medicines. These can increase your chances of quitting for good.  Avoid contact with people who have never had rubella and who have not been immunized.  When should you call for help?   Call your doctor now or seek immediate medical care if:    You have a fever with a stiff neck or a severe headache.     You are sensitive to light or feel very sleepy or confused.   Watch closely for changes in your health, and be sure to contact your doctor if:    You do not get better as expected.   Where can you learn more?  Go to https://www.SolFocus.net/patiented  Enter B812 in the search box to learn more about \"Rubella (Portuguese Measles): Care Instructions.\"  Current as of: April 30, 2024  Content Version: 14.5 2024-2025 [employer].   Care instructions adapted under license by your healthcare professional. If you have questions about a medical condition or this instruction, always ask your healthcare professional. [employer] disclaims any warranty or liability for your use of this information.    Gonorrhea and Chlamydia: About These Tests  What is it?  These tests use a sample of urine or other body fluid to look for the bacteria that cause these sexually transmitted " "infections (STIs). The fluid sample can come from the cervix, vagina, rectum, throat, or eyes.  Why is this test done?  These tests may be done to:  Find out if symptoms are caused by gonorrhea or chlamydia.  Check people who are at high risk of being infected with gonorrhea or chlamydia.  Retest people several months after they have been treated for gonorrhea or chlamydia.  Check for infection in your  if you had a gonorrhea or chlamydia infection at the time of delivery.  How can you prepare for the test?  If you are going to have a urine test, do not urinate for at least 1 hour before the test.  If you think you may have chlamydia or gonorrhea, don't have sexual intercourse until you get your test results. And you may want to have tests for other STIs, such as HIV.  How is the test done?  For a direct sample, a swab is used to collect body fluid from the cervix, vagina, rectum, throat, or eyes. Your doctor may collect the sample. Or you may be given instructions on how to collect your own sample.  For a urine sample, you will collect the urine that comes out when you first start to urinate. Don't wipe the genital area clean before you urinate.  How long does the test take?  The test will take a few minutes.  What happens after the test?  You will be able to go home right away.  You can go back to your usual activities right away.  If you do have an infection, don't have sexual intercourse for 7 days after you start treatment. And your sex partner(s) should also be treated.  Follow-up care is a key part of your treatment and safety. Be sure to make and go to all appointments, and call your doctor if you are having problems. It's also a good idea to keep a list of the medicines you take. Ask your doctor when you can expect to have your test results.  Where can you learn more?  Go to https://www.healthwise.net/patiented  Enter K976 in the search box to learn more about \"Gonorrhea and Chlamydia: About These " "Tests.\"  Current as of: April 30, 2024  Content Version: 14.5    4865-6387 [employer].   Care instructions adapted under license by your healthcare professional. If you have questions about a medical condition or this instruction, always ask your healthcare professional. [employer] disclaims any warranty or liability for your use of this information.    Trichomoniasis: About This Test  What is it?     This test uses a sample of urine or other body fluid to look for the tiny parasite that causes trichomoniasis (also called trich). The fluid sample can come from the vagina, cervix, or urethra. Your doctor may choose to use one or more of many available tests.  Why is it done?  A trich test may be done to:  Find out if symptoms are caused by trich.  Check people who are at high risk for being infected with trich.  Check after treatment to make sure that the infection is gone.  How do you prepare for the test?  If you are going to have a urine test, do not urinate for at least 1 hour before the test.  How is the test done?  For a direct sample, a swab is used to collect body fluid from the cervix, vagina, or urethra. Your doctor may collect the sample. Or you may be given instructions on how to collect your own sample.  For a urine sample, you will collect the urine that comes out when you first start to urinate. Don't wipe the area clean before you urinate.  How long does the test take?  It will take a few minutes to collect a sample.  What happens after the test?  You can go home right away.  You can go back to your usual activities right away.  You may get the test results the same day or several days later. It depends on the test used.  If you do have an infection, don't have sexual intercourse for 7 days after you start treatment. Your sex partner or partners should also be treated.  Follow-up care is a key part of your treatment and safety. Be sure to make and go to all appointments, and " call your doctor if you are having problems. Ask your doctor when you can expect to have your test results.  Current as of: April 30, 2024  Content Version: 14.5    2861-2620 [employer].   Care instructions adapted under license by your healthcare professional. If you have questions about a medical condition or this instruction, always ask your healthcare professional. [employer] disclaims any warranty or liability for your use of this information.    HIV Testing: Care Instructions  Overview  You can get tested for the human immunodeficiency virus (HIV). Most doctors use a blood test to check for HIV antibodies and antigens in your blood. It may also check for the genetic material (RNA) of HIV. Some tests use saliva to check for HIV antibodies. But these aren't as accurate. For example, they may give a false result if you've just been infected.  What do the results mean?        Normal (negative)   No HIV antibodies, antigens, or RNA were found.  You may need more testing. It can make sure your test results are correct.        Uncertain (indeterminate)   Test results didn't clearly show if you have an HIV infection.  HIV antibodies or antigens may not have formed yet.  Some other type of antibody or antigen may have affected the results.  You will need another test to be sure.        Abnormal (positive)   HIV antibodies, antigens, or RNA were found.  If you haven't had an RNA test yet, one will be done. If it's positive, you have HIV.  If your test result is positive, your doctor will talk to you. You will discuss starting treatment.  Follow-up care is a key part of your treatment and safety. Be sure to make and go to all appointments, and call your doctor if you are having problems. It's also a good idea to know your test results and keep a list of the medicines you take.  Where can you learn more?  Go to https://www.[employer].net/patiented  Enter T792 in the search box to learn more about  "\"HIV Testing: Care Instructions.\"  Current as of: April 30, 2024  Content Version: 14.5    1188-2337 [employer].   Care instructions adapted under license by your healthcare professional. If you have questions about a medical condition or this instruction, always ask your healthcare professional. [employer] disclaims any warranty or liability for your use of this information.    Hepatitis C Virus Tests: About These Tests  What are they?     Hepatitis C virus tests are blood tests that check for substances in the blood that show whether you have hepatitis C now or had it in the past. The tests can also tell you what type of hepatitis C you have and how severe the disease is. This can help your doctor with treatment.  If the tests show that you have long-term hepatitis C, you need to take steps to prevent spreading the disease.  Why are these tests done?  You may need these tests if:  You have symptoms of hepatitis.  You may have been exposed to the virus. You are more likely to have been exposed to the virus if you inject drugs or are exposed to body fluids (such as if you are a health care worker).  You've had other tests that show you have liver problems.  You are 18 to 79 years old.  You have an HIV infection.  The tests also are done to help your doctor decide about your treatment and see how well it works.  How do you prepare for the test?  In general, there's nothing you have to do before this test, unless your doctor tells you to.  How is the test done?  A health professional uses a needle to take a blood sample, usually from the arm.  What happens after these tests?  You will probably be able to go home right away.  You can go back to your usual activities right away.  Follow-up care is a key part of your treatment and safety. Be sure to make and go to all appointments, and call your doctor if you are having problems. It's also a good idea to keep a list of the medicines you take. Ask " "your doctor when you can expect to have your test results.  Where can you learn more?  Go to https://www.[employer].net/patiented  Enter W551 in the search box to learn more about \"Hepatitis C Virus Tests: About These Tests.\"  Current as of: April 30, 2024  Content Version: 14.5    6583-0976 [employer]   Care instructions adapted under license by your healthcare professional. If you have questions about a medical condition or this instruction, always ask your healthcare professional. [employer] disclaims any warranty or liability for your use of this information.    Learning About Fetal Ultrasound Results  What is a fetal ultrasound?     Fetal ultrasound is a test that lets your doctor see an image of your baby. Your doctor learns information about your baby from this picture. You may find out, for example, if you are having a boy or a girl. But the main reason you have this test is to get information about your baby's health.  (You may hear your baby called a fetus. This is a common medical term for a baby that's growing in the mother's uterus.)  What kind of information can you learn from this test?  The findings of an ultrasound fall into two categories, normal and abnormal.  Normal  The fetus is the right size for its age.  The placenta is the expected size and does not cover the cervix.  There is enough amniotic fluid in the uterus.  No birth defects can be seen.  Abnormal  The fetus is small or large for its age.  The placenta covers the cervix.  There is too much or too little amniotic fluid in the uterus.  The fetus may have a birth defect.  What does an abnormal result mean?  Abnormal seems to imply that something is wrong with your baby. But what it means is that the test has shown something the doctor wants to take a closer look at.  And that's what happens next. Your doctor will talk to you about what further test or tests you may need.  What do the results mean?  Some of the " things your doctor may see on an abnormal ultrasound include:  Echogenic bowel.  The bowel looks very bright on the screen. This could mean that there's blood in the bowel. Or it could mean that something is blocking the small bowel.  Increased nuchal translucency.  The ultrasound measures the thickness at the back of the baby's neck. An increase in thickness is sometimes an early sign of Down syndrome.  Increased or decreased amniotic fluid.  The doctor will look for a reason for the level of amniotic fluid and will watch the pregnancy closely as it progresses.  Large ventricles.  Ventricles in the brain look larger than they should. Your doctor may take a closer look at the brain.  Renal pyelectasis/hydronephrosis.  The ultrasound measures the fluid around the kidney. If there is more fluid than expected, there is a chance of urinary tract or kidney problems.  Short long bones.  The ultrasound measures certain arm and leg bones. A long bone (humerus or femur) that is shorter than average could be a sign of Down syndrome.  Subchorionic hemorrhage.  An ultrasound can show bleeding under one of the membranes that surrounds the fetus. Some women don't have symptoms of bleeding. The ultrasound can find this problem when women are not bleeding from their vagina. Women who have this condition have a slightly higher chance of miscarriage.  What do you do now?  Take a deep breath, and let it out. Keep in mind that an abnormal finding on an ultrasound, after it's coupled with more information, may:  Turn out to be nothing.  Turn out to be something mild that won't affect the baby.  Turn out to be something more serious. But if this happens, early diagnosis helps you and your doctor plan treatment options sooner rather than later.  Your medical team is there for you. So are your family and friends. Ask questions, and get the help and support you need.  Follow-up care is a key part of your treatment and safety. Be sure to  "make and go to all appointments, and call your doctor if you are having problems. It's also a good idea to know your test results and keep a list of the medicines you take.  Where can you learn more?  Go to https://www.[email or web address].net/patiented  Enter K451 in the search box to learn more about \"Learning About Fetal Ultrasound Results.\"  Current as of: April 30, 2024  Content Version: 14.5    7754-5329 [employer].   Care instructions adapted under license by your healthcare professional. If you have questions about a medical condition or this instruction, always ask your healthcare professional. [employer] disclaims any warranty or liability for your use of this information.    Learning About Prenatal Visits  Overview     Regular prenatal visits are very important during any pregnancy. These quick office visits may seem simple and routine. But they can help you have a safe and healthy pregnancy. Your doctor is watching for problems that can only be found through regular checkups. The visits also give you and your doctor time to build a good relationship.  After your first visit, you will most likely start on a schedule of monthly visits. In your third trimester, the visits will get more frequent. Based on your health, your age, and if you've had a normal, full-term pregnancy before, your doctor may want to see you more or less often.  At different times in your pregnancy, you will have exams and tests. Some are routine. Others are done only when there is a chance of a problem. Everything healthy you do for your body helps you have a healthy pregnancy. Rest when you need it. Eat well, drink plenty of water, and exercise regularly.  What happens during a prenatal visit?  You will have blood pressure checks, along with urine tests. You also may have blood tests. If you need to go to the bathroom while waiting for the doctor, tell the nurse. You will be given a sample cup so your urine can be " tested.  You will be weighed and have your belly measured.  Your doctor may listen to the fetal heartbeat with a special device.  At about 24 weeks, and possibly earlier in your pregnancy, your doctor will check your blood sugar (glucose tolerance test) for diabetes that can occur during pregnancy. This is gestational diabetes, which can be harmful.  You will have tests to check for infections that could harm your . These include group B streptococcus and hepatitis B.  Your doctor may do ultrasounds to check for problems. This also checks the position of the fetus. An ultrasound uses sound waves to produce a picture of the fetus.  You may get your vaccines updated.  Your doctor may ask you questions to check for signs of anxiety or depression. Tell your doctor if you feel sad, anxious, or hopeless for more than a few days.  You may have other tests at any time during your pregnancy.  Use your visits to discuss with your doctor any concerns you have.  How can you care for yourself at home?  Get plenty of rest.  Try to exercise every day, if your doctor says it is okay. If you have not exercised in the past, start out slowly. For example, you can take short walks each day.  Choose healthy foods, such as fruits, vegetables, whole grains, lean proteins, low-fat dairy, and healthy fats.  Drink plenty of fluids. Cut down on drinks with caffeine, such as coffee, tea, and cola. If you have kidney, heart, or liver disease and have to limit fluids, talk with your doctor before you increase the amount of fluids you drink.  Try to avoid chemical fumes, paint fumes, and poisons.  If you smoke, vape, or use alcohol, marijuana, or other drugs, quit or cut back as much as you can. Talk to your doctor if you need help quitting.  Review all of your medicines, including over-the-counter medicines and supplements, with your doctor. Some of your routine medicines may need to be changed. Do not stop or start taking any medicines  "without talking to your doctor first.  Follow-up care is a key part of your treatment and safety. Be sure to make and go to all appointments, and call your doctor if you are having problems. It's also a good idea to know your test results and keep a list of the medicines you take.  Where can you learn more?  Go to https://www.Ghostruck.net/patiented  Enter J502 in the search box to learn more about \"Learning About Prenatal Visits.\"  Current as of: April 30, 2024  Content Version: 14.5    0398-8959 [employer].   Care instructions adapted under license by your healthcare professional. If you have questions about a medical condition or this instruction, always ask your healthcare professional. [employer] disclaims any warranty or liability for your use of this information.    Intimate Partner Violence: Care Instructions  Overview     If you want to save this information but don't think it is safe to take it home, see if a trusted friend can keep it for you. Plan ahead. Know who you can call for help, and memorize the phone number.   Be careful online too. Your online activity may be seen by others. Do not use your personal computer or device to read about this topic. Use a safe computer, such as one at work, a friend's home, or a library.    Intimate partner violence--a type of domestic abuse--is different from an argument now and then. It is a pattern of abuse that one person may use to control another person's behavior. It may start with threats and name-calling. Then, it may lead to more serious acts, like pushing and slapping. The abuse also may occur in other areas. For example, the abuser may withhold money or spend a partner's money without their knowledge.  Abuse can cause serious harm. You are more likely to have a long-term health problem from the injuries and stress of living in a violent relationship. People who are sexually abused by their partners have more sexually transmitted " "infections and unplanned pregnancies. Anyone who is abused also faces emotional pain. Anyone can be abused in relationships. In some relationships, both people use abusive behavior.  If you are pregnant, abuse can cause problems such as poor weight gain, infections, and bleeding. Abuse during this time may increase your baby's risk of low birth weight, premature birth, and death.  Follow-up care is a key part of your treatment and safety. Be sure to make and go to all appointments, and call your doctor if you are having problems. It's also a good idea to know your test results and keep a list of the medicines you take.  How can you care for yourself at home?  If you do not have a safe place to stay, discuss this with your doctor before you leave.  Have a plan for where to go, how to leave your home, and where to stay in case of an emergency. Do not tell your partner about your plan. Contact:  The National Domestic Violence Hotline toll-free at 1-903.962.2665. They can help you find resources in your area.  Your local police department, hospital, or clinic for information about shelters and safe homes near you.  Talk to a trusted friend or neighbor, a counselor, or a lobito leader. Do not feel that you have to hide what happened.  Teach your children how to call for help in an emergency.  Be alert to warning signs, such as threats, heavy alcohol use, or drug use. This can help you avoid danger.  If you can, make sure that there are no guns or other weapons in your home.  When should you call for help?   Call 911 anytime you think you may need emergency care. For example, call if:    You or someone else has just been abused.     You think you or someone else is in danger of being abused.   Watch closely for changes in your health, and be sure to contact your doctor if you have any problems.  Where can you learn more?  Go to https://www.healthwise.net/patiented  Enter G282 in the search box to learn more about \"Intimate " "Partner Violence: Care Instructions.\"  Current as of: July 31, 2024  Content Version: 14.5    5460-4528 [employer].   Care instructions adapted under license by your healthcare professional. If you have questions about a medical condition or this instruction, always ask your healthcare professional. [employer] disclaims any warranty or liability for your use of this information.    Intimate Partner Violence Safety Instructions: Care Instructions  Overview     If you want to save this information but don't think it is safe to take it home, see if a trusted friend can keep it for you. Plan ahead. Know who you can call for help, and memorize the phone number.   Be careful online too. Your online activity may be seen by others. Do not use your personal computer or device to read about this topic. Use a safe computer, such as one at work, a friend's home, or a library.    When you are abused by a spouse or partner, you can take actions to protect yourself and your children.  You can increase your safety whether you decide to stay with your spouse or partner or you decide to leave. You may want to make a safety plan and pack a bag ahead of time. This will help you leave quickly when you decide to. Remember, you cannot change your partner's actions, but you can find help for you and your children. No one deserves to be abused.  Follow-up care is a key part of your treatment and safety. Be sure to make and go to all appointments, and call your doctor if you are having problems. It's also a good idea to know your test results and keep a list of the medicines you take.  How can you care for yourself at home?  Make a plan for your safety   If you decide to stay with your abusive spouse or partner, you can do the following to increase your safety:  Decide what works best to keep you safe in an emergency.  Know who you can call to help you in an emergency.  Decide if you will call the police if you get " hurt again. If you can, agree on a signal with your children or neighbor to call the police for you if you need help. You can flash lights or hang something out of a window.  Choose a safe place to go for a short time if you need to leave home. Memorize the address and phone number.  Learn escape routes out of your home in case you need to leave in a hurry. Teach your children different ways to get out of your home quickly if they need to.  If you can, hide or lock up things that can be used as weapons (guns, knives, hammers).  Learn the number of a domestic violence shelter. Talk to the people there about how they can help.  Find out about other community resources that can help you.  Take pictures of bruises or other injuries if you can. You can also take pictures of things your abuser has broken.  Teach your children that violence is never okay. Tell them that they do not deserve to be hurt.  Pack a bag   Prepare a bag with things you will need if you leave suddenly. Leave it with a friend, a relative, or someone else you trust. You could include the following items in the bag:  A set of keys to your home and car.  Emergency phone numbers and addresses.  Money such as cash or checks. You can also ask a friend, a relative, or someone else you trust to hold money for you.  Copies of legal documents such as house and car titles or rent receipts, birth certificates, Social Security card, voter registration, marriage and 's licenses, and your children's health records.  Personal items you would need for a few days, such as clothes, a toothbrush, toothpaste, and any medicines you or your children need.  A favorite toy or book for your child or children.  Diapers and bottles, if you have very young children.  Pictures that show signs of abuse and violence. You may also add pictures of your abuser.  If you leave   If you decide to leave, you can take the following steps:  Go to the emergency room at a hospital if  "you have been hurt.  Think about asking the police to be with you as you leave. They can protect you as you leave your home.  If you decide to leave secretly, remember that activities can be tracked. Your abuser may still have access to your cell phone, email, and credit cards. It may be possible for these to be traced. Always be aware of your surroundings.  If this is an emergency, do not worry about gathering up anything. Just leave--your safety is most important.  If your abuser moves out, change the locks on the doors. If you have a security system, change the access code.  When should you call for help?   Call 911 anytime you think you may need emergency care. For example, call if:    You or someone else has just been abused.     You think you or someone else is in danger of being abused.   Watch closely for changes in your health, and be sure to contact your doctor if you have any problems.  Where can you learn more?  Go to https://www.[employer]net/patiented  Enter A752 in the search box to learn more about \"Intimate Partner Violence Safety Instructions: Care Instructions.\"  Current as of: July 31, 2024  Content Version: 14.5    3159-7242 [employer].   Care instructions adapted under license by your healthcare professional. If you have questions about a medical condition or this instruction, always ask your healthcare professional. [employer] disclaims any warranty or liability for your use of this information.    Learning About Intimate Partner Violence  What is intimate partner violence?  Intimate partner violence is a type of domestic abuse. It's threatening, emotionally harmful, or violent behavior in a personal relationship. It can happen between past or current partners or spouses. In some relationships both people abuse each other. One partner may be more abusive. Or the abuse may be equal.  Abuse can affect people of any ethnic group, race, or Quaker. It can affect teens, " adults, or the elderly. And it can happen to people of any sexual orientation, gender, or social status.  Abusers use fear, bullying, and threats to control their partners. They may control what their partners do. They may control where their partners go or who they see. They may act jealous, controlling, or possessive. These early signs of abuse may happen soon after the start of the relationship. Sometimes it can be hard to notice abuse at first. But after the relationship becomes more serious, the abuse may get worse.  If you are being abused in your relationship, it's important to get help. The abuse is not your fault. You don't have to face it alone.  Be careful  It may not be safe to take home domestic abuse information like this handout. Some people ask a trusted friend to keep it for them. It's also important to plan ahead and to memorize the phone number of places you can go for help. If you are concerned about your safety, do not use your computer, smartphone, or tablet to read about domestic abuse.   What are the types of intimate partner violence?  Abuse can happen in different ways. Each type can happen on its own or in combination with others.  Emotional abuse  Emotional abuse is a pattern of threats, insults, or controlling behavior. It includes verbal abuse. It goes beyond healthy disagreements in a relationship. It's a sign of an unhealthy relationship.  Do you feel threatened, intimidated, or controlled?  Does your partner:  Threaten your children, other family members, or pets?  Use jokes meant to embarrass or shame you?  Call you names?  Tell you that you are a bad parent?  Threaten to take away your children?  Threaten to have you or your family members deported?  Control your access to money or other basic needs?  Control what you do, who you see or talk to, or where you go?  Another form of emotional abuse is denying that it is happening. Or the abuser may act like the abuse is no big deal or  is your fault.  Sexual abuse  With sexual abuse, abusers may try to convince or force you to have sex. They may force you into sex acts you're not comfortable with. Or they may sexually assault you. Sexual abuse can happen even if you are in a committed relationship.  Physical abuse  Physical abuse means that a partner hits, kicks, or does something else to physically hurt you. Physical abuse that starts with a slap might lead to kicking, shoving, and choking over time. The abuser may also threaten to hurt or kill you.  Stalking  Stalking means that an abuser gives you attention that you do not want and that causes you fear. Examples of stalking include:  Following you.  Showing up at places where the abuser isn't invited, such as at your work or school.  Constantly calling or texting you.  What problems can  to?  Intimate partner violence can be very dangerous. It can cause serious, repeated injury. It can even lead to death.  All forms of abuse can cause long-term health problems from the stress of a violent relationship. Verbal abuse can lead to sexual and physical abuse.  Abuse causes:  Emotional pain.  Depression.  Anxiety.  Post-traumatic stress.  Sexual abuse can lead to sexually transmitted infections (such as HIV/AIDS) and unplanned pregnancy.  Pregnancy can be a very dangerous time for people in abusive relationships. Abuse can cause or increase the risk of problems during pregnancy. These include low weight gain, anemia, infections, and bleeding. Abuse may also increase your baby's risk of low birth weight, premature birth, and death.  It can be hard for some victims of abuse to ask for help or to leave their relationship. You may feel scared, stuck, or not sure what steps to take. But it's important not to ignore abuse. Talking to someone you trust could be the first step to ending the abuse and taking care of your own health and happiness again. There are resources available that can help keep  "you safe.  Where can you get help?  Talk to a trusted friend. Find a local advocacy group, or talk to your doctor about the abuse.  Contact the National Domestic Violence Hotline at 9-580-334-JWQQ (1-932.404.3999) for more safety tips. They can guide you to groups in your area that can help. Or go to the National Coalition Against Domestic Violence website at www.theVHSquared.org to learn more.  Domestic violence groups or a counselor in your area can help you make a safety plan for yourself and your children.  When to call for help  Call 911 anytime you think you may need emergency care. For example, call if:  You think that you or someone you know is in danger of being abused.  You have been hurt and can't have someone safely take you to emergency care.  You have just been abused.  A family member has just been abused.  Where can you learn more?  Go to https://www..com.net/patiented  Enter S665 in the search box to learn more about \"Learning About Intimate Partner Violence.\"  Current as of: July 31, 2024  Content Version: 14.5    2969-1410 [email or web address].   Care instructions adapted under license by your healthcare professional. If you have questions about a medical condition or this instruction, always ask your healthcare professional. [employer] disclaims any warranty or liability for your use of this information.    Vaginal Bleeding During Pregnancy: Care Instructions  Overview     It's common to have some vaginal spotting when you are pregnant. In some cases, the bleeding isn't serious. And there aren't any more problems with the pregnancy.  But sometimes bleeding is a sign of a more serious problem. This is more common if the bleeding is heavy or painful. Examples of more serious problems include miscarriage, an ectopic pregnancy, and a problem with the placenta.  You may have to see your doctor again to be sure everything is okay. You may also need more tests to find the cause of the " bleeding.  Home treatment may be all you need. But it depends on what is causing the bleeding. Be sure to tell your doctor if you have any new symptoms or if your symptoms get worse.  The doctor has checked you carefully, but problems can develop later. If you notice any problems or new symptoms, get medical treatment right away.  Follow-up care is a key part of your treatment and safety. Be sure to make and go to all appointments, and call your doctor if you are having problems. It's also a good idea to know your test results and keep a list of the medicines you take.  How can you care for yourself at home?  If your doctor prescribed medicines, take them exactly as directed. Call your doctor if you think you are having a problem with your medicine.  Do not have vaginal sex until your doctor says it's okay.  Do not put anything in your vagina until your doctor says it's okay.  Ask your doctor about other activities you can or can't do.  Get a lot of rest. Being pregnant can make you tired.  Do not use nonsteroidal anti-inflammatory drugs (NSAIDs), such as ibuprofen (Advil, Motrin), naproxen (Aleve), or aspirin, unless your doctor says it is okay.  When should you call for help?   Call 911 anytime you think you may need emergency care. For example, call if:    You passed out (lost consciousness).     You have severe vaginal bleeding. This means you are soaking through a pad each hour for 2 or more hours.     You have sudden, severe pain in your belly or pelvis.   Call your doctor now or seek immediate medical care if:    You have new or worse vaginal bleeding.     You are dizzy or lightheaded, or you feel like you may faint.     You have pain in your belly, pelvis, or lower back.     You think that you are in labor.     You have a sudden release of fluid from your vagina.     You've been having regular contractions for an hour. This means that you've had at least 8 contractions within 1 hour or at least 4  contractions within 20 minutes, even after you change your position and drink fluids.     You notice that your baby has stopped moving or is moving much less than normal.   Watch closely for changes in your health, and be sure to contact your doctor if you have any problems.  Current as of: April 30, 2024  Content Version: 14.5    8057-8403 [employer].   Care instructions adapted under license by your healthcare professional. If you have questions about a medical condition or this instruction, always ask your healthcare professional. [employer] disclaims any warranty or liability for your use of this information.

## 2025-07-28 DIAGNOSIS — O09.91 SUPERVISION OF HIGH RISK PREGNANCY IN FIRST TRIMESTER: Primary | ICD-10-CM

## 2025-07-28 LAB
ABO + RH BLD: NORMAL
BLD GP AB SCN SERPL QL: NEGATIVE
SPECIMEN EXP DATE BLD: NORMAL

## 2025-07-28 RX ORDER — INSULIN LISPRO 100 [IU]/ML
5 INJECTION, SOLUTION INTRAVENOUS; SUBCUTANEOUS
Qty: 15 ML | Refills: 4 | Status: SHIPPED | OUTPATIENT
Start: 2025-07-28

## 2025-07-28 RX ORDER — INSULIN GLARGINE-YFGN 100 [IU]/ML
14 INJECTION, SOLUTION SUBCUTANEOUS AT BEDTIME
Qty: 15 ML | Refills: 4 | Status: SHIPPED | OUTPATIENT
Start: 2025-07-28

## 2025-07-28 NOTE — TELEPHONE ENCOUNTER
Diabetes in Pregnancy Follow-up    Subjective/Objective:    Timothy Crowell sent in blood glucose log for review. Last date of communication was: 7/24.    Type 2 Diabetes in Pregnancy is being managed with medications    Taking diabetes medications: yes:     Diabetes Medication(s)       Biguanides       metFORMIN (GLUCOPHAGE XR) 500 MG 24 hr tablet TAKE ONE TABLET BY MOUTH TWICE A DAY WITH MEALS     Patient not taking: Reported on 7/15/2025       Insulin       Insulin Glargine-yfgn (SEMGLEE, YFGN,) 100 UNIT/ML SOPN Inject 12 Units subcutaneously at bedtime. TDD 14 unit(s) with prime.     insulin lispro (HUMALOG KWIKPEN) 100 UNIT/ML (1 unit dial) KWIKPEN Inject 4 Units subcutaneously 3 times daily (before meals). TDD 18 unit(s) with priming.            Estimated Date of Delivery: Mar 10, 2026    BG/Food Log:                   Assessment:    Fasting blood glucoses: 0% in target.  After breakfast: 0% in target.  Before lunch: -% in target.  After lunch: 25% in target.  Before dinner: -% in target.  After dinner: 0% in target.    Timothy is being careful with her carbs and actually having too few carbs each day to try to help her BGs. Is exercising and having protein and veggies at each meal. Praised her on this.  Would benefit from raising all her insulin doses today.     Plan/Response:  Recommend increase to insulin - Increase all doses by 20%. Increase Glargine 12 --> 14 unit(s)/d and increase meal doses 4 --> 5 unit(s) at all meals.   Follow-up in 3-4 days - Thursday morning via [facility] for BG review again.    Ambika Salinas, JACOBYN, LD, CDCES      Any diabetes medication dose changes were made via the CDCES Standing Orders under the patient's referring provider.

## 2025-07-29 ENCOUNTER — ANCILLARY PROCEDURE (OUTPATIENT)
Dept: ULTRASOUND IMAGING | Facility: CLINIC | Age: 35
End: 2025-07-29
Attending: OBSTETRICS & GYNECOLOGY
Payer: COMMERCIAL

## 2025-07-29 ENCOUNTER — PRENATAL OFFICE VISIT (OUTPATIENT)
Dept: OBGYN | Facility: CLINIC | Age: 35
End: 2025-07-29
Attending: OBSTETRICS & GYNECOLOGY
Payer: COMMERCIAL

## 2025-07-29 VITALS
DIASTOLIC BLOOD PRESSURE: 63 MMHG | HEIGHT: 60 IN | HEART RATE: 62 BPM | BODY MASS INDEX: 27.33 KG/M2 | WEIGHT: 139.2 LBS | SYSTOLIC BLOOD PRESSURE: 100 MMHG | OXYGEN SATURATION: 100 %

## 2025-07-29 DIAGNOSIS — E11.65 TYPE 2 DIABETES MELLITUS WITH HYPERGLYCEMIA, WITHOUT LONG-TERM CURRENT USE OF INSULIN (H): ICD-10-CM

## 2025-07-29 DIAGNOSIS — O09.91 SUPERVISION OF HIGH RISK PREGNANCY IN FIRST TRIMESTER: Primary | ICD-10-CM

## 2025-07-29 DIAGNOSIS — O09.91 SUPERVISION OF HIGH RISK PREGNANCY IN FIRST TRIMESTER: ICD-10-CM

## 2025-07-29 LAB
ALBUMIN MFR UR ELPH: <6 MG/DL
ALBUMIN UR-MCNC: NEGATIVE MG/DL
ALT SERPL W P-5'-P-CCNC: 24 U/L (ref 0–50)
APPEARANCE UR: CLEAR
AST SERPL W P-5'-P-CCNC: 23 U/L (ref 0–45)
BACTERIA #/AREA URNS HPF: ABNORMAL /HPF
BILIRUB UR QL STRIP: NEGATIVE
COLOR UR AUTO: YELLOW
CREAT SERPL-MCNC: 0.53 MG/DL (ref 0.51–0.95)
CREAT UR-MCNC: 36.3 MG/DL
EGFRCR SERPLBLD CKD-EPI 2021: >90 ML/MIN/1.73M2
ERYTHROCYTE [DISTWIDTH] IN BLOOD BY AUTOMATED COUNT: 12.3 % (ref 10–15)
EST. AVERAGE GLUCOSE BLD GHB EST-MCNC: 154 MG/DL
GLUCOSE UR STRIP-MCNC: NEGATIVE MG/DL
HBA1C MFR BLD: 7 % (ref 0–5.6)
HBV SURFACE AG SERPL QL IA: NONREACTIVE
HCT VFR BLD AUTO: 37.5 % (ref 35–47)
HCV AB SERPL QL IA: NONREACTIVE
HGB BLD-MCNC: 12.6 G/DL (ref 11.7–15.7)
HGB UR QL STRIP: NEGATIVE
HIV 1+2 AB+HIV1 P24 AG SERPL QL IA: NONREACTIVE
KETONES UR STRIP-MCNC: NEGATIVE MG/DL
LEUKOCYTE ESTERASE UR QL STRIP: ABNORMAL
MCH RBC QN AUTO: 29.4 PG (ref 26.5–33)
MCHC RBC AUTO-ENTMCNC: 33.6 G/DL (ref 31.5–36.5)
MCV RBC AUTO: 88 FL (ref 78–100)
NITRATE UR QL: NEGATIVE
PH UR STRIP: 6 [PH] (ref 5–7)
PLATELET # BLD AUTO: 280 10E3/UL (ref 150–450)
PROT/CREAT 24H UR: NORMAL MG/G{CREAT}
RBC # BLD AUTO: 4.28 10E6/UL (ref 3.8–5.2)
RBC #/AREA URNS AUTO: ABNORMAL /HPF
RUBV IGG SERPL QL IA: 19.7 INDEX
RUBV IGG SERPL QL IA: POSITIVE
SP GR UR STRIP: <=1.005 (ref 1–1.03)
SQUAMOUS #/AREA URNS AUTO: ABNORMAL /LPF
T PALLIDUM AB SER QL: NONREACTIVE
UROBILINOGEN UR STRIP-ACNC: 0.2 E.U./DL
WBC # BLD AUTO: 9.4 10E3/UL (ref 4–11)
WBC #/AREA URNS AUTO: ABNORMAL /HPF
WBC CLUMPS #/AREA URNS HPF: PRESENT /HPF

## 2025-07-29 PROCEDURE — 36415 COLL VENOUS BLD VENIPUNCTURE: CPT | Performed by: OBSTETRICS & GYNECOLOGY

## 2025-07-29 PROCEDURE — 86803 HEPATITIS C AB TEST: CPT | Performed by: OBSTETRICS & GYNECOLOGY

## 2025-07-29 PROCEDURE — 86762 RUBELLA ANTIBODY: CPT | Performed by: OBSTETRICS & GYNECOLOGY

## 2025-07-29 PROCEDURE — 99204 OFFICE O/P NEW MOD 45 MIN: CPT | Performed by: OBSTETRICS & GYNECOLOGY

## 2025-07-29 PROCEDURE — 3078F DIAST BP <80 MM HG: CPT | Performed by: OBSTETRICS & GYNECOLOGY

## 2025-07-29 PROCEDURE — 3074F SYST BP LT 130 MM HG: CPT | Performed by: OBSTETRICS & GYNECOLOGY

## 2025-07-29 PROCEDURE — 0500F INITIAL PRENATAL CARE VISIT: CPT | Performed by: OBSTETRICS & GYNECOLOGY

## 2025-07-29 PROCEDURE — 86901 BLOOD TYPING SEROLOGIC RH(D): CPT | Performed by: OBSTETRICS & GYNECOLOGY

## 2025-07-29 PROCEDURE — 76801 OB US < 14 WKS SINGLE FETUS: CPT | Performed by: OBSTETRICS & GYNECOLOGY

## 2025-07-29 PROCEDURE — G2211 COMPLEX E/M VISIT ADD ON: HCPCS | Performed by: OBSTETRICS & GYNECOLOGY

## 2025-07-29 PROCEDURE — 86780 TREPONEMA PALLIDUM: CPT | Performed by: OBSTETRICS & GYNECOLOGY

## 2025-07-29 PROCEDURE — 85027 COMPLETE CBC AUTOMATED: CPT | Performed by: OBSTETRICS & GYNECOLOGY

## 2025-07-29 PROCEDURE — 82565 ASSAY OF CREATININE: CPT | Performed by: OBSTETRICS & GYNECOLOGY

## 2025-07-29 PROCEDURE — 83036 HEMOGLOBIN GLYCOSYLATED A1C: CPT | Performed by: OBSTETRICS & GYNECOLOGY

## 2025-07-29 PROCEDURE — 87389 HIV-1 AG W/HIV-1&-2 AB AG IA: CPT | Performed by: OBSTETRICS & GYNECOLOGY

## 2025-07-29 PROCEDURE — 86900 BLOOD TYPING SEROLOGIC ABO: CPT | Performed by: OBSTETRICS & GYNECOLOGY

## 2025-07-29 PROCEDURE — 87340 HEPATITIS B SURFACE AG IA: CPT | Performed by: OBSTETRICS & GYNECOLOGY

## 2025-07-29 PROCEDURE — 84156 ASSAY OF PROTEIN URINE: CPT | Performed by: OBSTETRICS & GYNECOLOGY

## 2025-07-29 PROCEDURE — 86850 RBC ANTIBODY SCREEN: CPT | Performed by: OBSTETRICS & GYNECOLOGY

## 2025-07-29 PROCEDURE — 84460 ALANINE AMINO (ALT) (SGPT): CPT | Performed by: OBSTETRICS & GYNECOLOGY

## 2025-07-29 PROCEDURE — 84450 TRANSFERASE (AST) (SGOT): CPT | Performed by: OBSTETRICS & GYNECOLOGY

## 2025-07-29 RX ORDER — ASPIRIN 81 MG/1
81 TABLET ORAL DAILY
Qty: 90 TABLET | Refills: 2 | Status: SHIPPED | OUTPATIENT
Start: 2025-08-26

## 2025-07-29 NOTE — PROGRESS NOTES
[facility]- OBGYN    Estimated Date of Delivery: Mar 10, 2026  SUBJECTIVE:     HPI:    Presents with her spouse and daughter.    Alex Mccartney is a 35 year old  at 8w0d by LMP c/w 7w5d us who presents today for her first OB visit.  She states she has been working closely with diabetic education and now has a continuous glucose monitor.  She did have some vaginal bleeding at 6 weeks that was initially bright red and then was light spotting for 1 week.  No bleeding since.  Denies any cramping, vaginal discharge, dysuria, nausea, vomiting, headache, changes in vision, chest pain, chest pressure, shortness of breath, or edema.      OBGYN Hx:   OB History    Para Term  AB Living   2 1 1 0 0 1   SAB IAB Ectopic Multiple Live Births   0 0 0 0 1      # Outcome Date GA Lbr Mateus/2nd Weight Sex Type Anes PTL Lv   2 Current            1 Term 10/19/20 39w0d  3.48 kg (7 lb 10.8 oz) F CS-LTranv Spinal N YAMILKA      Name: KIERRA MCCARTNEY-ALEX      Apgar1: 8  Apgar5: 9       Patient's last menstrual period was 2025.  Menses: regular prior to pregnancy  STD Hx: none  Pap hx:2020 NILM HPV negative     PMH:   Past Medical History:   Diagnosis Date    Diabetes (H)     gestational    Gestational diabetes 2022    Hyperlipidemia        PSH:  Past Surgical History:   Procedure Laterality Date    APPENDECTOMY       SECTION N/A 10/19/2020    Procedure:  SECTION;  Surgeon: Vane Champagne MD;  Location: [facility] L[facility]    EXAM UNDER ANESTHESIA PELVIC N/A 10/6/2020    Procedure: EXAM UNDER ANESTHESIA, PELVIS: ;  Surgeon: Mya Lancaster MD;  Location: [facility] L[facility]       Meds:  Current Outpatient Medications   Medication Sig Dispense Refill    alcohol swab prep pads Use to swab area of injection/dixie as directed 100 each 3    [START ON 2025] aspirin 81 MG EC tablet Take 1 tablet (81 mg) by mouth daily. 90 tablet 2    blood glucose (NO BRAND SPECIFIED) test strip Use  to test blood sugar 4 time daily or as directed. To accompany: Blood Glucose Monitor Brands: per insurance. 150 strip 11    blood glucose monitoring (NO BRAND SPECIFIED) meter device kit Use to test blood sugar 1 times daily or as directed. 1 kit 0    Continuous Glucose Sensor (FREESTYLE LEONELA 3 PLUS SENSOR) MISC Change every 15 days. 2 each 11    Insulin Glargine-yfgn (SEMGLEE, YFGN,) 100 UNIT/ML SOPN Inject 14 Units subcutaneously at bedtime. TDD 16 unit(s) with prime. 15 mL 4    insulin lispro (HUMALOG KWIKPEN) 100 UNIT/ML (1 unit dial) KWIKPEN Inject 5 Units subcutaneously 3 times daily (before meals). TDD 21 unit(s) with priming. 15 mL 4    insulin pen needle (32G X 4 MM) 32G X 4 MM miscellaneous Use 4 pen needles daily or as directed. 200 each 5    Prenatal Vit-Fe Fumarate-FA (PRENATAL VITAMIN) 27-0.8 MG TABS Take 1 tablet by mouth daily. 90 tablet 3    thin (NO BRAND SPECIFIED) lancets Use to test blood sugar 4 times daily or as directed. To accompany: Blood Glucose Monitor Brands: per insurance. 150 each 0    fluticasone (FLONASE) 50 MCG/ACT nasal spray Spray 1 spray into both nostrils daily (Patient not taking: Reported on 7/29/2025) 11.1 mL 3    metFORMIN (GLUCOPHAGE XR) 500 MG 24 hr tablet TAKE ONE TABLET BY MOUTH TWICE A DAY WITH MEALS (Patient not taking: Reported on 7/29/2025) 180 tablet 0     No current facility-administered medications for this visit.       Allergies:  Allergies   Allergen Reactions    Crestor [Rosuvastatin] Muscle Pain (Myalgia)    Seasonal Allergies        SH:Denies any tobacco, alcohol, or drugs   FH:I have reviewed this patient's family history and updated it with pertinent information if needed.  Family History   Problem Relation Age of Onset    Liver Cancer Mother     Cerebrovascular Disease Father     Hypertension Father     Hyperlipidemia Sister     Diabetes Sister     Breast Cancer No family hx of     Colon Cancer No family hx of        OBJECTIVE:     O: Patient Vitals for  the past 24 hrs:   BP Pulse SpO2 Height Weight   07/29/25 1119 100/63 62 100 % 1.524 m (5') 63.1 kg (139 lb 3.2 oz)   ]  Exam:  General- awake, alert, answering questions appropriately, appears comfortable  CV- regular rate  Lung- breathing comfortably on room air  Ext- bilateral lower extremities without edema    Narrative & Impression   Obstetrical Ultrasound Report  OB U/S  < 14 Weeks -  Transabdominal   [facility]     Referring Provider: Charissa Garcia MD   Sonographer: Ana Del Real, Registered Diagnostic Medical Sonographer, [facility]  Indication:  Viability check  and Size and Dates     Dating (mm/dd/yyyy):   LMP: 06/03/2025            EDC:  03/10/2026            GA by LMP:         8w0d     Current Scan On:  7/29/2025          EDC:  03/12/2026            GA by Current Scan:        7w5d  The calculation of the gestational age by current scan was based on CRL.     Anatomy Scan:  Mast gestation.     Biometry:  Gest Sac MSD      2.53 cm  CRL                       1.44 cm                7w5d                      Yolk Sac                2.3 mm                                                   Cardiac activity:  Rate and rhythm is within normal limits.   153 bpm  Findings: Single IUP with normal cardiac activity. There is a SCB inferior to the sac measuring 26 x 11 x 34 mm. It appears mostly clotted.       Maternal Structures:  Cervix: The cervix appears long and closed.  Right Ovary: Not visualized   Left Ovary: Simple cyst 34 x 21 x 26 mm  Technique:Transabdominal Imaging performed, Pt refused TVS     Impression:   Early mast intrauterine pregnancy with yolk sac and cardiac activity, measures 7w 5d by today's ultrasound, which is consistent with menstrual dating.   Estimated Date of Delivery remains Mar 10, 2026.  There is a subchorionic hematoma inferior to the sac measuring 26 x 11 x 34 mm.     Isabella Dia MD        ASSESSMENT/PLAN:     Timothy Menjivar  ly Crowell is a 35 year old  at 8w0d by LMP c/w 7w5d us who presents today for her first OB visit.     (O09.91) Supervision of high risk pregnancy in first trimester  (primary encounter diagnosis)  Comment: Reviewed ultrasound findings today including SCB.  Discussed pregnancy dating and due date determination.  We reviewed routine 1st OB labs to be done today.  Patient has MFM genetic counseling and NT scheduled for 25.  Explained [facility] delivery location, clinic visit schedule, call schedule, and team structure including participation of medical students and residents in hospital care.  Patient has history of  and would likely proceed with repeat for delivery.      Plan: Hemoglobin A1c, AST, ALT, Creatinine, Protein          random urine, aspirin 81 MG EC tablet    (E11.65) Type 2 diabetes mellitus with hyperglycemia, without long-term current use of insulin (H)  Comment: Closely working with diabetic education and taking insulin currently.  Has continuous blood sugar monitor.  Most recent Hgb A1c was 7.6, plan repeat today.  Discussed increased risk of anatomic abnormalities in uncontrolled diabetes.  Explained ultrasound with MFM and anatomy scan for evaluation.  Discussed increased risk of elevated BP (gHTN and pre-e) in pregnancy and recommendation to start ASA 81mg at 12 weeks gestation.    Plan: Baseline HELLP labs.    Next diabetes visit on 25    Charissa Garcia MD

## 2025-07-30 LAB
C TRACH DNA SPEC QL PROBE+SIG AMP: NEGATIVE
N GONORRHOEA DNA SPEC QL NAA+PROBE: NEGATIVE
SPECIMEN TYPE: NORMAL

## 2025-07-31 ENCOUNTER — VIRTUAL VISIT (OUTPATIENT)
Dept: ENDOCRINOLOGY | Facility: CLINIC | Age: 35
End: 2025-07-31
Payer: COMMERCIAL

## 2025-07-31 DIAGNOSIS — O24.111 PREGNANCY COMPLICATED BY PRE-EXISTING TYPE 2 DIABETES IN FIRST TRIMESTER: Primary | ICD-10-CM

## 2025-07-31 ASSESSMENT — PAIN SCALES - GENERAL: PAINLEVEL_OUTOF10: NO PAIN (0)

## 2025-07-31 NOTE — NURSING NOTE
Current patient location: [address] [facility] [address]    Is the patient currently in the state of [place]? YES    Visit mode: VIDEO    If the visit is dropped, the patient can be reconnected by:VIDEO VISIT: Text to cell phone:   Telephone Information:   Mobile [phone]       Will anyone else be joining the visit? NO  (If patient encounters technical issues they should call [phone])    Are changes needed to the allergy or medication list? No    Are refills needed on medications prescribed by this physician? NO    Rooming Documentation:  Questionnaire(s) not done per department protocol    Reason for visit: Consult    Pt states that she is thirsty all the time     Shelby Kocher VVF

## 2025-07-31 NOTE — PROGRESS NOTES
Virtual Visit Details    Type of service:  Video Visit     Originating Location (pt. Location): Home    Distant Location (provider location):  On-site  Platform used for Video Visit: Widgetbox  Assessment/Plan :   Pre-existing type 2 diabetes and pregnancy. Timothy is worried about her blood sugars. She knows that it is important to get her blood sugars under tighter control but she doesn't feel like the insulin is working. We reviewed her recent CGMS report and her numbers are a bit higher than target. However, they are improving. Her most recent hemoglobin A1C is 7% down from 7.6%. We discussed how insulin resistance increases throughout pregnancy and it is not uncommon to need high doses of insulin to manage blood sugars. We reviewed blood sugar goals and she is not currently meeting fasting or post-prandial goals. I would like her to increase Semglee to 15 unit(s) nightly and she will increase the insulin lispro to 6 unit(s) with meals. Her post-dinner blood sugars tend to be higher than breakfast and lunch and we discussed that she may need different amounts of insulin with different meals. She can discuss this further with Ambika at her next diabetes education visit. We will follow-up in 1 month.     Due to the COVID 19 pandemic this visit was a telephone/video visit in order to help prevent spread of infection in this patient and the general population. The patient gave verbal consent for the visit today. I have independently reviewed and interpreted labs, imaging as indicated.       Distant Location (provider location):  On-site  Mode of Communication:  Video Conference via Ismole  Chart review/prep time 10    Joined the call at 7/31/2025, 7:26:52 am.  Left the call at 7/31/2025, 7:44:53 am.  You were on the call for 18 minutes  32 minutes spent on the date of the encounter doing chart review, history and exam, documentation and further activities as noted above.      Chief complaint:  Timothy is a 35 year old  female who comes to our office for help in the management of type 2 diabetes and pregnancy.    I have reviewed Care Everywhere including [facility], [facility], [facility], [facility], [facility], [facility] , [facility], [place] lab reports, imaging reports and provider notes as indicated.      HISTORY OF PRESENT ILLNESS  Timothy comes to our office for help in the management of blood sugars during pregnancy. She is currently 8 wks gestation with her second pregnancy. She was originally diagnosed with gestational diabetes during her first pregnancy at the age of 29. During that pregnancy she managed her blood sugars with diet at the beginning of the pregnancy and was using MDI therapy towards the end of the pregnancy. Her blood sugars normalized after the pregnancy but she was eventually started on metformin.     Before this pregnancy she was taking metformin 500 mg twice daily. She feels like it was working well but her hemoglobin A1C had increased to 8% around the time of conception. She had been working to manage her blood sugars through diet but her blood sugars remained elevated. She has noticed that the post-prandial numbers will really spike up even with healthy meals. She was started on insulin on July 15th but she states that she has continued to struggle with post-prandial spikes.     She is currently taking 13 unit(s) of Semglee every evening. She reports that her blood sugars have improved but morning readings are still between 110 and 120 mg/dl. She is also taking insulin lispro 5 unit(s) before meals. She tries to take this about 10 min before eating. She doesn't feel like this insulin is doing much. Her blood sugars still often spike up to 180 mg/dl after meals. She has also noticed that her blood sugars will occasional drop lower at around 1 hr but then spike back up to 2 hrs. She is using the FreeStyle Celia 3 plus sensor to monitor her blood sugars closely. She feels like this is working  much better than the fingerstick testing.     Endocrine relevant labs are as follows:   Latest Reference Range & Units 07/29/25 11:58   Hemoglobin A1C 0.0 - 5.6 % 7.0 (H)   (H): Data is abnormally high   Latest Reference Range & Units 06/24/25 09:01   Hemoglobin A1C 0.0 - 5.6 % 7.6 (H)   (H): Data is abnormally high   Latest Reference Range & Units 06/24/25 09:23   Albumin Urine mg/g Cr 0.00 - 25.00 mg/g Cr 24.34      Latest Reference Range & Units 06/24/25 09:23   Albumin Urine mg/L mg/L 44.3     REVIEW OF SYSTEMS    10 system ROS otherwise as per the HPI or negative    Past Medical History  Past Medical History:   Diagnosis Date    Diabetes (H)     gestational    Gestational diabetes 5/19/2022    Hyperlipidemia        Medications  Current Outpatient Medications   Medication Sig Dispense Refill    alcohol swab prep pads Use to swab area of injection/dixie as directed 100 each 3    [START ON 8/26/2025] aspirin 81 MG EC tablet Take 1 tablet (81 mg) by mouth daily. 90 tablet 2    blood glucose (NO BRAND SPECIFIED) test strip Use to test blood sugar 4 time daily or as directed. To accompany: Blood Glucose Monitor Brands: per insurance. 150 strip 11    blood glucose monitoring (NO BRAND SPECIFIED) meter device kit Use to test blood sugar 1 times daily or as directed. 1 kit 0    Continuous Glucose Sensor (FREESTYLE LEONELA 3 PLUS SENSOR) MISC Change every 15 days. 2 each 11    Insulin Glargine-yfgn (SEMGLEE, YFGN,) 100 UNIT/ML SOPN Inject 14 Units subcutaneously at bedtime. TDD 16 unit(s) with prime. 15 mL 4    insulin lispro (HUMALOG KWIKPEN) 100 UNIT/ML (1 unit dial) KWIKPEN Inject 5 Units subcutaneously 3 times daily (before meals). TDD 21 unit(s) with priming. 15 mL 4    insulin pen needle (32G X 4 MM) 32G X 4 MM miscellaneous Use 4 pen needles daily or as directed. 200 each 5    Prenatal Vit-Fe Fumarate-FA (PRENATAL VITAMIN) 27-0.8 MG TABS Take 1 tablet by mouth daily. 90 tablet 3    thin (NO BRAND SPECIFIED) lancets  Use to test blood sugar 4 times daily or as directed. To accompany: Blood Glucose Monitor Brands: per insurance. 150 each 0    fluticasone (FLONASE) 50 MCG/ACT nasal spray Spray 1 spray into both nostrils daily (Patient not taking: Reported on 7/31/2025) 11.1 mL 3    metFORMIN (GLUCOPHAGE XR) 500 MG 24 hr tablet TAKE ONE TABLET BY MOUTH TWICE A DAY WITH MEALS (Patient not taking: Reported on 7/31/2025) 180 tablet 0       Allergies  Allergies   Allergen Reactions    Crestor [Rosuvastatin] Muscle Pain (Myalgia)    Seasonal Allergies        Family History  family history includes Cerebrovascular Disease in her father; Diabetes in her sister; Hyperlipidemia in her sister; Hypertension in her father; Liver Cancer in her mother.    Social History  Social History     Tobacco Use    Smoking status: Never     Passive exposure: Never    Smokeless tobacco: Never   Vaping Use    Vaping status: Never Used   Substance Use Topics    Alcohol use: Never    Drug use: Never       Physical Exam  There is no height or weight on file to calculate BMI.  GENERAL: no distress  SKIN: Visible skin clear. No significant rash, abnormal pigmentation or lesions.  EYES: Eyes grossly normal to inspection.  No discharge or erythema, or obvious scleral/conjunctival abnormalities.  NECK: visible goiter is not present; no visible neck masses  RESP: No audible wheeze, cough, or visible cyanosis.  No visible retractions or increased work of breathing.    NEURO: Awake, alert, responds appropriately to questions.  Mentation and speech fluent.  PSYCH:affect normal and appearance well-groomed.      DATA REVIEW  FreeStyle LibreView  Time in target range 83%  High 17%  Current Ave  mg/dl

## 2025-07-31 NOTE — LETTER
7/31/2025      Timothy Crowell  [address] [facility] [address]      Dear Colleague,    Thank you for referring your patient, Timothy Crowell, to the [facility]. Please see a copy of my visit note below.    Outcome for 07/31/25 7:13 AM: Data obtained via PhoneFusion website  Rosa Anthony MA              Virtual Visit Details    Type of service:  Video Visit     Originating Location (pt. Location): Home  {PROVIDER LOCATION On-site should be selected for visits conducted from your clinic location or adjoining [facility] hospital, academic office, or other nearby [facility] building. Off-site should be selected for all other provider locations, including home:[id number]}  Distant Location (provider location):  On-site  Platform used for Video Visit: Donna  Assessment/Plan :   Pre-existing type 2 diabetes and pregnancy. Timothy is worried about her blood sugars. She knows that it is important to get her blood sugars under tighter control but she doesn't feel like the insulin is working. We reviewed her recent CGMS report and her numbers are a bit higher than target. However, they are improving. Her most recent hemoglobin A1C is 7% down from 7.6%. We discussed how insulin resistance increases throughout pregnancy and it is not uncommon to need high doses of insulin to manage blood sugars. We reviewed blood sugar goals and she is not currently meeting fasting or post-prandial goals. I would like her to increase Semglee to 15 unit(s) nightly and she will increase the insulin lispro to 6 unit(s) with meals. Her post-dinner blood sugars tend to be higher than breakfast and lunch and we discussed that she may need different amounts of insulin with different meals. She can discuss this further with Ambika at her next diabetes education visit. We will follow-up in 1 month.     Due to the COVID 19 pandemic this visit was a telephone/video visit in order to help prevent spread of infection in this patient  and the general population. The patient gave verbal consent for the visit today. I have independently reviewed and interpreted labs, imaging as indicated.     {PROVIDER LOCATION On-site should be selected for visits conducted from your clinic location or adjoining [facility] hospital, academic office, or other nearby [facility] building. Off-site should be selected for all other provider locations, including home:[id number]}  Distant Location (provider location):  On-site  Mode of Communication:  Video Conference via Cookstr  Chart review/prep time 10    Joined the call at 7/31/2025, 7:26:52 am.  Left the call at 7/31/2025, 7:44:53 am.  You were on the call for 18 minutes  32 minutes spent on the date of the encounter doing chart review, history and exam, documentation and further activities as noted above.      Chief complaint:  Timothy is a 35 year old female who comes to our office for help in the management of type 2 diabetes and pregnancy.    I have reviewed Care Everywhere including [facility], [facility], [facility], [facility], [facility] , [facility], [place] lab reports, imaging reports and provider notes as indicated.      HISTORY OF PRESENT ILLNESS  Timothy comes to our office for help in the management of blood sugars during pregnancy. She is currently 8 wks gestation with her second pregnancy. She was originally diagnosed with gestational diabetes during her first pregnancy at the age of 29. During that pregnancy she managed her blood sugars with diet at the beginning of the pregnancy and was using MDI therapy towards the end of the pregnancy. Her blood sugars normalized after the pregnancy but she was eventually started on metformin.     Before this pregnancy she was taking metformin 500 mg twice daily. She feels like it was working well but her hemoglobin A1C had increased to 8% around the time of conception. She had been working to manage her blood sugars through diet but her blood sugars  remained elevated. She has noticed that the post-prandial numbers will really spike up even with healthy meals. She was started on insulin on July 15th but she states that she has continued to struggle with post-prandial spikes.     She is currently taking 13 unit(s) of Semglee every evening. She reports that her blood sugars have improved but morning readings are still between 110 and 120 mg/dl. She is also taking insulin lispro 5 unit(s) before meals. She tries to take this about 10 min before eating. She doesn't feel like this insulin is doing much. Her blood sugars still often spike up to 180 mg/dl after meals. She has also noticed that her blood sugars will occasional drop lower at around 1 hr but then spike back up to 2 hrs. She is using the FreeStyle Celia 3 plus sensor to monitor her blood sugars closely. She feels like this is working much better than the fingerstick testing.     Endocrine relevant labs are as follows:   Latest Reference Range & Units 07/29/25 11:58   Hemoglobin A1C 0.0 - 5.6 % 7.0 (H)   (H): Data is abnormally high   Latest Reference Range & Units 06/24/25 09:01   Hemoglobin A1C 0.0 - 5.6 % 7.6 (H)   (H): Data is abnormally high   Latest Reference Range & Units 06/24/25 09:23   Albumin Urine mg/g Cr 0.00 - 25.00 mg/g Cr 24.34      Latest Reference Range & Units 06/24/25 09:23   Albumin Urine mg/L mg/L 44.3     REVIEW OF SYSTEMS    10 system ROS otherwise as per the HPI or negative    Past Medical History  Past Medical History:   Diagnosis Date     Diabetes (H)     gestational     Gestational diabetes 5/19/2022     Hyperlipidemia        Medications  Current Outpatient Medications   Medication Sig Dispense Refill     alcohol swab prep pads Use to swab area of injection/dixie as directed 100 each 3     [START ON 8/26/2025] aspirin 81 MG EC tablet Take 1 tablet (81 mg) by mouth daily. 90 tablet 2     blood glucose (NO BRAND SPECIFIED) test strip Use to test blood sugar 4 time daily or as  directed. To accompany: Blood Glucose Monitor Brands: per insurance. 150 strip 11     blood glucose monitoring (NO BRAND SPECIFIED) meter device kit Use to test blood sugar 1 times daily or as directed. 1 kit 0     Continuous Glucose Sensor (FREESTYLE LEONELA 3 PLUS SENSOR) MISC Change every 15 days. 2 each 11     Insulin Glargine-yfgn (SEMGLEE, YFGN,) 100 UNIT/ML SOPN Inject 14 Units subcutaneously at bedtime. TDD 16 unit(s) with prime. 15 mL 4     insulin lispro (HUMALOG KWIKPEN) 100 UNIT/ML (1 unit dial) KWIKPEN Inject 5 Units subcutaneously 3 times daily (before meals). TDD 21 unit(s) with priming. 15 mL 4     insulin pen needle (32G X 4 MM) 32G X 4 MM miscellaneous Use 4 pen needles daily or as directed. 200 each 5     Prenatal Vit-Fe Fumarate-FA (PRENATAL VITAMIN) 27-0.8 MG TABS Take 1 tablet by mouth daily. 90 tablet 3     thin (NO BRAND SPECIFIED) lancets Use to test blood sugar 4 times daily or as directed. To accompany: Blood Glucose Monitor Brands: per insurance. 150 each 0     fluticasone (FLONASE) 50 MCG/ACT nasal spray Spray 1 spray into both nostrils daily (Patient not taking: Reported on 7/31/2025) 11.1 mL 3     metFORMIN (GLUCOPHAGE XR) 500 MG 24 hr tablet TAKE ONE TABLET BY MOUTH TWICE A DAY WITH MEALS (Patient not taking: Reported on 7/31/2025) 180 tablet 0       Allergies  Allergies   Allergen Reactions     Crestor [Rosuvastatin] Muscle Pain (Myalgia)     Seasonal Allergies        Family History  family history includes Cerebrovascular Disease in her father; Diabetes in her sister; Hyperlipidemia in her sister; Hypertension in her father; Liver Cancer in her mother.    Social History  Social History     Tobacco Use     Smoking status: Never     Passive exposure: Never     Smokeless tobacco: Never   Vaping Use     Vaping status: Never Used   Substance Use Topics     Alcohol use: Never     Drug use: Never       Physical Exam  There is no height or weight on file to calculate BMI.  GENERAL: no  distress  SKIN: Visible skin clear. No significant rash, abnormal pigmentation or lesions.  EYES: Eyes grossly normal to inspection.  No discharge or erythema, or obvious scleral/conjunctival abnormalities.  NECK: visible goiter is not present; no visible neck masses  RESP: No audible wheeze, cough, or visible cyanosis.  No visible retractions or increased work of breathing.    NEURO: Awake, alert, responds appropriately to questions.  Mentation and speech fluent.  PSYCH:affect normal and appearance well-groomed.      DATA REVIEW  FreeStyle LibreView  Time in target range 83%  High 17%  Current Ave  mg/dl          Again, thank you for allowing me to participate in the care of your patient.        Sincerely,        Isabella Marie PA-C    Electronically signed

## 2025-08-04 ENCOUNTER — VIRTUAL VISIT (OUTPATIENT)
Dept: EDUCATION SERVICES | Facility: CLINIC | Age: 35
End: 2025-08-04
Payer: COMMERCIAL

## 2025-08-04 DIAGNOSIS — E11.65 TYPE 2 DIABETES MELLITUS WITH HYPERGLYCEMIA, WITHOUT LONG-TERM CURRENT USE OF INSULIN (H): ICD-10-CM

## 2025-08-04 DIAGNOSIS — O24.111 PRE-EXISTING TYPE 2 DIABETES MELLITUS DURING PREGNANCY IN FIRST TRIMESTER: ICD-10-CM

## 2025-08-04 PROCEDURE — G0108 DIAB MANAGE TRN  PER INDIV: HCPCS | Mod: 95 | Performed by: DIETITIAN, REGISTERED

## 2025-08-04 RX ORDER — INSULIN GLARGINE-YFGN 100 [IU]/ML
15 INJECTION, SOLUTION SUBCUTANEOUS AT BEDTIME
Qty: 15 ML | Refills: 4 | Status: SHIPPED | OUTPATIENT
Start: 2025-08-04

## 2025-08-04 RX ORDER — INSULIN LISPRO 100 [IU]/ML
INJECTION, SOLUTION INTRAVENOUS; SUBCUTANEOUS
Qty: 15 ML | Refills: 4 | Status: SHIPPED | OUTPATIENT
Start: 2025-08-04

## 2025-08-12 ENCOUNTER — MYC MEDICAL ADVICE (OUTPATIENT)
Dept: EDUCATION SERVICES | Facility: CLINIC | Age: 35
End: 2025-08-12
Payer: COMMERCIAL

## 2025-08-12 DIAGNOSIS — E11.65 TYPE 2 DIABETES MELLITUS WITH HYPERGLYCEMIA, WITHOUT LONG-TERM CURRENT USE OF INSULIN (H): ICD-10-CM

## 2025-08-12 RX ORDER — INSULIN LISPRO 100 [IU]/ML
INJECTION, SOLUTION INTRAVENOUS; SUBCUTANEOUS
Qty: 15 ML | Refills: 4 | Status: SHIPPED | OUTPATIENT
Start: 2025-08-12

## 2025-08-20 ENCOUNTER — PRE VISIT (OUTPATIENT)
Dept: MATERNAL FETAL MEDICINE | Facility: CLINIC | Age: 35
End: 2025-08-20
Payer: COMMERCIAL

## 2025-08-25 ENCOUNTER — VIRTUAL VISIT (OUTPATIENT)
Dept: EDUCATION SERVICES | Facility: CLINIC | Age: 35
End: 2025-08-25
Payer: COMMERCIAL

## 2025-08-25 DIAGNOSIS — E11.65 TYPE 2 DIABETES MELLITUS WITH HYPERGLYCEMIA, WITHOUT LONG-TERM CURRENT USE OF INSULIN (H): ICD-10-CM

## 2025-08-25 DIAGNOSIS — O24.111 PREGNANCY COMPLICATED BY PRE-EXISTING TYPE 2 DIABETES IN FIRST TRIMESTER: ICD-10-CM

## 2025-08-25 PROCEDURE — G0108 DIAB MANAGE TRN  PER INDIV: HCPCS | Mod: 95 | Performed by: DIETITIAN, REGISTERED

## 2025-08-25 RX ORDER — INSULIN LISPRO 100 [IU]/ML
INJECTION, SOLUTION INTRAVENOUS; SUBCUTANEOUS
Qty: 45 ML | Refills: 4 | Status: SHIPPED | OUTPATIENT
Start: 2025-08-25

## 2025-08-25 RX ORDER — INSULIN GLARGINE-YFGN 100 [IU]/ML
17 INJECTION, SOLUTION SUBCUTANEOUS AT BEDTIME
Qty: 30 ML | Refills: 4 | Status: SHIPPED | OUTPATIENT
Start: 2025-08-25

## 2025-08-26 ENCOUNTER — LAB (OUTPATIENT)
Dept: LAB | Facility: CLINIC | Age: 35
End: 2025-08-26
Attending: STUDENT IN AN ORGANIZED HEALTH CARE EDUCATION/TRAINING PROGRAM
Payer: COMMERCIAL

## 2025-08-26 ENCOUNTER — HOSPITAL ENCOUNTER (OUTPATIENT)
Dept: ULTRASOUND IMAGING | Facility: CLINIC | Age: 35
Discharge: HOME | End: 2025-08-26
Attending: STUDENT IN AN ORGANIZED HEALTH CARE EDUCATION/TRAINING PROGRAM
Payer: COMMERCIAL

## 2025-08-26 ENCOUNTER — OFFICE VISIT (OUTPATIENT)
Dept: MATERNAL FETAL MEDICINE | Facility: CLINIC | Age: 35
End: 2025-08-26
Attending: STUDENT IN AN ORGANIZED HEALTH CARE EDUCATION/TRAINING PROGRAM
Payer: COMMERCIAL

## 2025-08-26 DIAGNOSIS — O09.521 MULTIGRAVIDA OF ADVANCED MATERNAL AGE IN FIRST TRIMESTER: Primary | ICD-10-CM

## 2025-08-26 DIAGNOSIS — O26.90 PREGNANCY RELATED CONDITION, ANTEPARTUM: ICD-10-CM

## 2025-08-26 DIAGNOSIS — O24.119 PRE-EXISTING TYPE 2 DIABETES MELLITUS DURING PREGNANCY, ANTEPARTUM: ICD-10-CM

## 2025-08-26 DIAGNOSIS — Z84.81 FAMILY HISTORY OF CARRIER OF GENETIC DISEASE: ICD-10-CM

## 2025-08-26 PROCEDURE — 76813 OB US NUCHAL MEAS 1 GEST: CPT

## 2025-08-26 PROCEDURE — 76813 OB US NUCHAL MEAS 1 GEST: CPT | Mod: 26 | Performed by: STUDENT IN AN ORGANIZED HEALTH CARE EDUCATION/TRAINING PROGRAM

## 2025-08-26 PROCEDURE — 96041 GENETIC COUNSELING SVC EA 30: CPT

## 2025-08-28 ENCOUNTER — VIRTUAL VISIT (OUTPATIENT)
Dept: ENDOCRINOLOGY | Facility: CLINIC | Age: 35
End: 2025-08-28
Payer: COMMERCIAL

## 2025-08-28 VITALS — WEIGHT: 139 LBS | HEIGHT: 60 IN | BODY MASS INDEX: 27.29 KG/M2

## 2025-08-28 DIAGNOSIS — O24.111 PREGNANCY COMPLICATED BY PRE-EXISTING TYPE 2 DIABETES IN FIRST TRIMESTER: Primary | ICD-10-CM

## 2025-08-28 ASSESSMENT — PAIN SCALES - GENERAL: PAINLEVEL_OUTOF10: NO PAIN (0)

## 2025-08-29 ENCOUNTER — APPOINTMENT (OUTPATIENT)
Dept: LAB | Facility: CLINIC | Age: 35
End: 2025-08-29
Payer: COMMERCIAL

## 2025-09-04 ENCOUNTER — TELEPHONE (OUTPATIENT)
Dept: MATERNAL FETAL MEDICINE | Facility: CLINIC | Age: 35
End: 2025-09-04
Payer: COMMERCIAL

## (undated) DEVICE — BASIN SET MAJOR

## (undated) DEVICE — GLOVE SENSICARE PI POWDER FREE 7.5 LATEX FREE MSG9075

## (undated) DEVICE — CATH TRAY FOLEY 16FR BARDEX W/DRAIN BAG STATLOCK 300316A

## (undated) DEVICE — GLOVE ESTEEM POWDER FREE SMT 7.0  2D72PT70

## (undated) DEVICE — PACK C-SECTION LF PL15OTA83B

## (undated) DEVICE — STOCKING SLEEVE COMPRESSION CALF LG

## (undated) DEVICE — PREP CHLORAPREP 26ML TINTED ORANGE  260815

## (undated) DEVICE — SOL NACL 0.9% IRRIG 1000ML BOTTLE 07138-09

## (undated) DEVICE — SU VICRYL 0 CT-1 36" J346H

## (undated) DEVICE — BNDG ABDOMINAL BINDER 10X26-50" 08140145

## (undated) DEVICE — SU MONOCRYL 0 CT-1 36" Y346H

## (undated) DEVICE — SUCTION CANISTER MEDIVAC LINER 1500ML W/LID 65651-515

## (undated) DEVICE — BARRIER SEPRAFILM 5X6" SINGLE SHEET 4301-02

## (undated) DEVICE — ESU GROUND PAD UNIVERSAL W/O CORD

## (undated) DEVICE — SOL WATER IRRIG 1000ML BOTTLE 07139-09

## (undated) DEVICE — STRAP KNEE/BODY 31143004

## (undated) DEVICE — STPL SKIN 35W 059037

## (undated) DEVICE — DRSG ADAPTIC 3X8" 6113

## (undated) RX ORDER — MORPHINE SULFATE 1 MG/ML
INJECTION, SOLUTION EPIDURAL; INTRATHECAL; INTRAVENOUS
Status: DISPENSED
Start: 2020-10-19

## (undated) RX ORDER — EPHEDRINE SULFATE 50 MG/ML
INJECTION, SOLUTION INTRAMUSCULAR; INTRAVENOUS; SUBCUTANEOUS
Status: DISPENSED
Start: 2020-10-06

## (undated) RX ORDER — FENTANYL CITRATE-0.9 % NACL/PF 10 MCG/ML
PLASTIC BAG, INJECTION (ML) INTRAVENOUS
Status: DISPENSED
Start: 2020-10-06

## (undated) RX ORDER — FENTANYL CITRATE-0.9 % NACL/PF 10 MCG/ML
PLASTIC BAG, INJECTION (ML) INTRAVENOUS
Status: DISPENSED
Start: 2020-10-19

## (undated) RX ORDER — FENTANYL CITRATE 50 UG/ML
INJECTION, SOLUTION INTRAMUSCULAR; INTRAVENOUS
Status: DISPENSED
Start: 2020-10-19

## (undated) RX ORDER — OXYTOCIN/0.9 % SODIUM CHLORIDE 30/500 ML
PLASTIC BAG, INJECTION (ML) INTRAVENOUS
Status: DISPENSED
Start: 2020-10-19